# Patient Record
Sex: MALE | Race: WHITE | NOT HISPANIC OR LATINO | URBAN - NONMETROPOLITAN AREA
[De-identification: names, ages, dates, MRNs, and addresses within clinical notes are randomized per-mention and may not be internally consistent; named-entity substitution may affect disease eponyms.]

---

## 2020-09-23 ENCOUNTER — TRANSCRIBE ORDERS (OUTPATIENT)
Dept: ADMINISTRATIVE | Facility: HOSPITAL | Age: 45
End: 2020-09-23

## 2020-09-23 DIAGNOSIS — R79.9 ABNORMAL FINDING OF BLOOD CHEMISTRY: ICD-10-CM

## 2020-09-23 DIAGNOSIS — F10.19 ALCOHOL ABUSE WITH UNSPECIFIED ALCOHOL-INDUCED DISORDER (HCC): Primary | ICD-10-CM

## 2020-09-28 ENCOUNTER — HOSPITAL ENCOUNTER (OUTPATIENT)
Dept: ULTRASOUND IMAGING | Facility: HOSPITAL | Age: 45
Discharge: HOME | End: 2020-09-28
Admitting: NURSE PRACTITIONER

## 2020-09-28 DIAGNOSIS — R79.9 ABNORMAL FINDING OF BLOOD CHEMISTRY: ICD-10-CM

## 2020-09-28 DIAGNOSIS — F10.19 ALCOHOL ABUSE WITH UNSPECIFIED ALCOHOL-INDUCED DISORDER (HCC): ICD-10-CM

## 2020-09-28 PROCEDURE — 76700 US EXAM ABDOM COMPLETE: CPT

## 2020-09-28 PROCEDURE — 76700 US EXAM ABDOM COMPLETE: CPT | Performed by: RADIOLOGY

## 2021-11-29 ENCOUNTER — HOSPITAL ENCOUNTER (OUTPATIENT)
Dept: GENERAL RADIOLOGY | Facility: HOSPITAL | Age: 46
Discharge: HOME | End: 2021-11-29
Admitting: PHYSICIAN ASSISTANT

## 2021-11-29 ENCOUNTER — OFFICE VISIT (OUTPATIENT)
Dept: ORTHOPEDIC SURGERY | Facility: CLINIC | Age: 46
End: 2021-11-29

## 2021-11-29 VITALS
HEIGHT: 72 IN | BODY MASS INDEX: 26.68 KG/M2 | SYSTOLIC BLOOD PRESSURE: 153 MMHG | WEIGHT: 197 LBS | DIASTOLIC BLOOD PRESSURE: 93 MMHG | HEART RATE: 89 BPM

## 2021-11-29 DIAGNOSIS — M25.572 LEFT ANKLE PAIN, UNSPECIFIED CHRONICITY: ICD-10-CM

## 2021-11-29 DIAGNOSIS — S82.842A CLOSED BIMALLEOLAR FRACTURE OF LEFT ANKLE, INITIAL ENCOUNTER: Primary | ICD-10-CM

## 2021-11-29 PROCEDURE — 27808 TREATMENT OF ANKLE FRACTURE: CPT | Performed by: PHYSICIAN ASSISTANT

## 2021-11-29 PROCEDURE — 73610 X-RAY EXAM OF ANKLE: CPT

## 2021-11-29 PROCEDURE — 73610 X-RAY EXAM OF ANKLE: CPT | Performed by: RADIOLOGY

## 2021-11-29 RX ORDER — HYDROCODONE BITARTRATE AND ACETAMINOPHEN 5; 325 MG/1; MG/1
1 TABLET ORAL EVERY 6 HOURS PRN
Status: ON HOLD | COMMUNITY
End: 2023-03-03

## 2021-11-29 RX ORDER — ALLOPURINOL 100 MG/1
TABLET ORAL
Status: ON HOLD | COMMUNITY
Start: 2021-10-19 | End: 2023-03-03

## 2021-11-29 RX ORDER — NAPROXEN 500 MG/1
500 TABLET ORAL 2 TIMES DAILY
Qty: 180 TABLET | Refills: 3 | Status: ON HOLD | OUTPATIENT
Start: 2021-11-29 | End: 2023-03-03
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
## 2021-12-01 ENCOUNTER — PATIENT ROUNDING (BHMG ONLY) (OUTPATIENT)
Dept: ORTHOPEDIC SURGERY | Facility: CLINIC | Age: 46
End: 2021-12-01

## 2021-12-01 NOTE — PROGRESS NOTES
December 1, 2021    Hello, may I speak with Ruperto Maxwell?    My name is Josiane Lucia.       I am  with [facility] [place]  [facility]  [address]  [place] [address] [phone].    Before we get started may I verify your date of birth? 1975    I am calling to officially welcome you to our practice and ask about your recent visit. Is this a good time to talk? yes    Tell me about your visit with us. What things went well?  Visit went fine. I liked the doctor and the staff were all great.        We're always looking for ways to make our patients' experiences even better. Do you have recommendations on ways we may improve?  no    Overall were you satisfied with your first visit to our practice? yes       I appreciate you taking the time to speak with me today. Is there anything else I can do for you? no      Thank you, and have a great day.      
2

## 2021-12-06 ENCOUNTER — HOSPITAL ENCOUNTER (OUTPATIENT)
Dept: GENERAL RADIOLOGY | Facility: HOSPITAL | Age: 46
Discharge: HOME | End: 2021-12-06
Admitting: PHYSICIAN ASSISTANT

## 2021-12-06 ENCOUNTER — OFFICE VISIT (OUTPATIENT)
Dept: ORTHOPEDIC SURGERY | Facility: CLINIC | Age: 46
End: 2021-12-06

## 2021-12-06 VITALS — BODY MASS INDEX: 26.68 KG/M2 | HEIGHT: 72 IN | WEIGHT: 197 LBS

## 2021-12-06 DIAGNOSIS — S82.842A CLOSED BIMALLEOLAR FRACTURE OF LEFT ANKLE, INITIAL ENCOUNTER: Primary | ICD-10-CM

## 2021-12-06 PROCEDURE — 99024 POSTOP FOLLOW-UP VISIT: CPT | Performed by: PHYSICIAN ASSISTANT

## 2021-12-06 PROCEDURE — 73610 X-RAY EXAM OF ANKLE: CPT | Performed by: RADIOLOGY

## 2021-12-06 PROCEDURE — 73610 X-RAY EXAM OF ANKLE: CPT

## 2021-12-06 RX ORDER — LANOLIN ALCOHOL/MO/W.PET/CERES
CREAM (GRAM) TOPICAL
Status: ON HOLD | COMMUNITY
Start: 2021-12-02 | End: 2023-03-03

## 2021-12-06 RX ORDER — FOLIC ACID 1 MG/1
TABLET ORAL
Status: ON HOLD | COMMUNITY
Start: 2021-12-02 | End: 2023-03-03

## 2021-12-06 NOTE — PROGRESS NOTES
[facility] [place] [place] Visit          Patient: Ruperto Maxwell  YOB: 1975  Date of Encounter: 12/6/2021  PCP: Tonya Arellano APRN       Subjective     Chief Complaint   Patient presents with   • Left Ankle - Follow-up, Fracture, Pain, Edema           History of Present Illness:      Ruperto Maxwell is a 46 y.o. male presents today secondary to approaching 2-week history which patient suffered a left bimalleolar ankle fracture.  Patient has been compliant with immobilization and maximum elevation.  He reports the swelling will go down causing the cast to become loose however upon independent position he will have return of the swelling and dull throbbing aching sensation with slight tightness into the cast.  He reports resolving ecchymosis into the midfoot progressing into the webspaces of the toes.  He reports longstanding altered paresthesia into the great toe as result of a previous injury.  He reports no other new complaints today.       There is no problem list on file for this patient.    History reviewed. No pertinent past medical history.  Past Surgical History:   Procedure Laterality Date   • HAND SURGERY       Social History     Occupational History   • Not on file   Tobacco Use   • Smoking status: Never Smoker   • Smokeless tobacco: Current User     Types: Snuff   Vaping Use   • Vaping Use: Never used   Substance and Sexual Activity   • Alcohol use: Yes     Comment: occasionally   • Drug use: Never   • Sexual activity: Defer    Ruperto Maxwell  reports that he has never smoked. His smokeless tobacco use includes snuff.. I have educated him on the risk of diseases from using tobacco products such as cancer, COPD and heart disease.       Social History     Social History Narrative   • Not on file     Family History   Problem Relation Age of Onset   • No Known Problems Mother    • No Known Problems Father      Current Outpatient Medications   Medication Sig Dispense Refill   •  "allopurinol (ZYLOPRIM) 100 MG tablet TAKE ONE TABLET BY MOUTH TWO TIMES A DAY FOR GOUT PREVENTION     • folic acid (FOLVITE) 1 MG tablet TAKE ONE TABLET BY MOUTH EVERY DAY FOR VITAMIN     • HYDROcodone-acetaminophen (NORCO) 5-325 MG per tablet Take 1 tablet by mouth Every 6 (Six) Hours As Needed.     • naproxen (NAPROSYN) 500 MG tablet Take 1 tablet by mouth 2 (Two) Times a Day. 180 tablet 3   • thiamine 100 MG tablet TAKE ONE TABLET BY MOUTH EVERY DAY FOR VITAMIN       No current facility-administered medications for this visit.     No Known Allergies         Review of Systems   Constitutional: Negative.   HENT: Negative.    Eyes: Negative.    Cardiovascular: Negative.    Respiratory: Negative.    Endocrine: Negative.    Hematologic/Lymphatic: Negative.    Skin: Negative.    Musculoskeletal:        Pertinent positives listed in HPI   Gastrointestinal: Negative.    Genitourinary: Negative.    Neurological: Negative.    Psychiatric/Behavioral: Negative.    Allergic/Immunologic: Negative.          Objective      Vitals:    12/06/21 1037   Weight: 89.4 kg (197 lb)   Height: 182.9 cm (72\")      Patient's Body mass index is 26.72 kg/m². indicating that he is overweight (BMI 25-29.9). Obesity-related health conditions include the following: Listed in PMH. Obesity is newly identified. BMI is is above average; BMI management plan is completed. We discussed portion control and increasing exercise..      Physical Exam  Vitals and nursing note reviewed.   Constitutional:       General: He is not in acute distress.     Appearance: Normal appearance. He is not ill-appearing.   HENT:      Head: Normocephalic and atraumatic.      Right Ear: External ear normal.      Left Ear: External ear normal.      Nose: Nose normal.      Mouth/Throat:      Mouth: Mucous membranes are moist.      Pharynx: Oropharynx is clear.   Eyes:      Extraocular Movements: Extraocular movements intact.      Conjunctiva/sclera: Conjunctivae normal.      " Pupils: Pupils are equal, round, and reactive to light.   Cardiovascular:      Rate and Rhythm: Normal rate.      Pulses: Normal pulses.   Pulmonary:      Effort: Pulmonary effort is normal.   Abdominal:      General: There is no distension.   Musculoskeletal:      Cervical back: Normal range of motion. No rigidity.      Comments: Left foot and ankle examination today reveals short leg fiberglass cast is intact with continued swelling into the midfoot extending into the webspaces of the toes resolving ecchymosis.patient's short leg fiberglass cast is intact with no significant skin or cast breakdown.  Neurovascular status grossly intact left lower extremity.   Skin:     General: Skin is warm and dry.      Capillary Refill: Capillary refill takes less than 2 seconds.   Neurological:      General: No focal deficit present.      Mental Status: He is alert and oriented to person, place, and time.      Cranial Nerves: Cranial nerves are intact.   Psychiatric:         Mood and Affect: Mood normal.         Behavior: Behavior normal.                 Radiology:      EXAMINATION: XR ANKLE 3+ VIEW LEFT-      CLINICAL INDICATION: left ankle pain; S82.842A-Displaced bimalleolar  fracture of left lower leg, initial encounter for closed fracture        COMPARISON: 11/29/2021.     FINDINGS:  Three views of the left ankle show distal fibular fracture and distal  tibial fracture. Alignment is near anatomic. Overlying cast is present.      This report was finalized on 12/6/2021 10:44 AM by Dr. Haris Gruber MD.             Assessment/Plan        ICD-10-CM ICD-9-CM   1. Closed bimalleolar fracture of left ankle, initial encounter  S82.842A 824.4     46-year-old male with approaching 2-week history of acute left ankle twisting injury which patient suffered a Raphael B distal fibula fracture with associated posterior malleolus fracture.  Patient will continue with immobilization with a short leg fiberglass cast.  He will return back in 3  weeks for repeat radiographs out of cast and periosteal callus check.  The patient may continue with the crutches or knee walker as well as his light duty work restrictions.  Once again follow-up in 3 weeks for repeat x-rays and periosteal callus check.                  This document was signed by Kurt Byrd PA-C December 6, 2021     CC: Tonya Arellano APRN EMR Dragon/Transcription disclaimer:  Part of this note may be completed utilizing the dragon speech recognition software. This electronic transcription/translation of spoken language to printed text may contain grammatical errors, random word insertions, pronoun errors, and incomplete sentences or occasional consequences of the system due to software limitations, ambient noise, and hardware issues.  Any questions or concerns about the content, text, or information contained within the body of this dictation should be directly addressed to the physician for clarification.

## 2021-12-28 DIAGNOSIS — S82.842A CLOSED BIMALLEOLAR FRACTURE OF LEFT ANKLE, INITIAL ENCOUNTER: Primary | ICD-10-CM

## 2021-12-30 ENCOUNTER — OFFICE VISIT (OUTPATIENT)
Dept: ORTHOPEDIC SURGERY | Facility: CLINIC | Age: 46
End: 2021-12-30

## 2021-12-30 ENCOUNTER — HOSPITAL ENCOUNTER (OUTPATIENT)
Dept: GENERAL RADIOLOGY | Facility: HOSPITAL | Age: 46
Discharge: HOME | End: 2021-12-30
Admitting: PHYSICIAN ASSISTANT

## 2021-12-30 VITALS — BODY MASS INDEX: 26.68 KG/M2 | WEIGHT: 197 LBS | HEIGHT: 72 IN

## 2021-12-30 DIAGNOSIS — S82.842A CLOSED BIMALLEOLAR FRACTURE OF LEFT ANKLE, INITIAL ENCOUNTER: ICD-10-CM

## 2021-12-30 DIAGNOSIS — S82.842D CLOSED BIMALLEOLAR FRACTURE OF LEFT ANKLE WITH ROUTINE HEALING, SUBSEQUENT ENCOUNTER: Primary | ICD-10-CM

## 2021-12-30 PROCEDURE — 99024 POSTOP FOLLOW-UP VISIT: CPT | Performed by: PHYSICIAN ASSISTANT

## 2021-12-30 PROCEDURE — 73610 X-RAY EXAM OF ANKLE: CPT | Performed by: RADIOLOGY

## 2021-12-30 PROCEDURE — 73610 X-RAY EXAM OF ANKLE: CPT

## 2021-12-30 NOTE — PROGRESS NOTES
[facility] – [place] Orth[facility] Established Patient Visit          Patient: Ruperto Maxwell  YOB: 1975  Date of Encounter: 12/30/2021  PCP: Tonya Arellano APRN       Subjective     Chief Complaint   Patient presents with   • Left Ankle - Follow-up           History of Present Illness:      Ruperto Maxwell is a 46 y.o. male presents today secondary to approaching 6 week history which patient suffered a left bimalleolar ankle fracture.  Patient has been compliant with immobilization and maximum elevation.  He reports the swelling will go down causing the cast to become loose however upon dependent position he will have return of the swelling. He has had reduction of previous dull throbbing aching sensation. He reports resolving ecchymosis into the midfoot progressing into the webspaces of the toes.  He reports longstanding altered paresthesia into the great toe as result of a previous injury.  Patient presents today for repeat radiographs out of cast and callus evaluation.  he reports no other new complaints today.       There is no problem list on file for this patient.    History reviewed. No pertinent past medical history.  Past Surgical History:   Procedure Laterality Date   • HAND SURGERY       Social History     Occupational History   • Not on file   Tobacco Use   • Smoking status: Never Smoker   • Smokeless tobacco: Current User     Types: Snuff   Vaping Use   • Vaping Use: Never used   Substance and Sexual Activity   • Alcohol use: Yes     Comment: occasionally   • Drug use: Never   • Sexual activity: Defer    Ruperto Maxwell  reports that he has never smoked. His smokeless tobacco use includes snuff.. I have educated him on the risk of diseases from using tobacco products such as cancer, COPD and heart disease.       Social History     Social History Narrative   • Not on file     Family History   Problem Relation Age of Onset   • No Known Problems Mother    • No Known Problems Father      Current  "Outpatient Medications   Medication Sig Dispense Refill   • allopurinol (ZYLOPRIM) 100 MG tablet TAKE ONE TABLET BY MOUTH TWO TIMES A DAY FOR GOUT PREVENTION     • folic acid (FOLVITE) 1 MG tablet TAKE ONE TABLET BY MOUTH EVERY DAY FOR VITAMIN     • HYDROcodone-acetaminophen (NORCO) 5-325 MG per tablet Take 1 tablet by mouth Every 6 (Six) Hours As Needed.     • naproxen (NAPROSYN) 500 MG tablet Take 1 tablet by mouth 2 (Two) Times a Day. 180 tablet 3   • thiamine 100 MG tablet TAKE ONE TABLET BY MOUTH EVERY DAY FOR VITAMIN       No current facility-administered medications for this visit.     No Known Allergies         Review of Systems   Constitutional: Negative.   HENT: Negative.    Eyes: Negative.    Cardiovascular: Negative.    Respiratory: Negative.    Endocrine: Negative.    Hematologic/Lymphatic: Negative.    Skin: Negative.    Musculoskeletal:        Pertinent positives listed in HPI   Gastrointestinal: Negative.    Genitourinary: Negative.    Neurological: Negative.    Psychiatric/Behavioral: Negative.    Allergic/Immunologic: Negative.          Objective      Vitals:    12/30/21 0931   Weight: 89.4 kg (197 lb)   Height: 182.9 cm (72\")      Patient's Body mass index is 26.72 kg/m². indicating that he is overweight (BMI 25-29.9). Obesity-related health conditions include the following: Listed in PMH. Obesity is newly identified. BMI is is above average; BMI management plan is completed. We discussed portion control and increasing exercise..      Physical Exam  Vitals and nursing note reviewed.   Constitutional:       General: He is not in acute distress.     Appearance: Normal appearance. He is not ill-appearing.   HENT:      Head: Normocephalic and atraumatic.      Right Ear: External ear normal.      Left Ear: External ear normal.      Nose: Nose normal.      Mouth/Throat:      Mouth: Mucous membranes are moist.      Pharynx: Oropharynx is clear.   Eyes:      Extraocular Movements: Extraocular movements " intact.      Conjunctiva/sclera: Conjunctivae normal.      Pupils: Pupils are equal, round, and reactive to light.   Cardiovascular:      Rate and Rhythm: Normal rate.      Pulses: Normal pulses.   Pulmonary:      Effort: Pulmonary effort is normal.   Abdominal:      General: There is no distension.   Musculoskeletal:      Cervical back: Normal range of motion. No rigidity.      Comments: Left foot and ankle examination today reveals short leg fiberglass cast removed revealing mild swelling into the midfoot extending into the webspaces of the toes. There is resolution of previous ecchymosis. No significant skin breakdown.  Patient has gentle full dorsiflexion plantarflexion with mild pain upon ankle inversion.  Subtalar motion intact.  Neurovascular status grossly intact left lower extremity.   Skin:     General: Skin is warm and dry.      Capillary Refill: Capillary refill takes less than 2 seconds.   Neurological:      General: No focal deficit present.      Mental Status: He is alert and oriented to person, place, and time.      Cranial Nerves: Cranial nerves are intact.   Psychiatric:         Mood and Affect: Mood normal.         Behavior: Behavior normal.                 Radiology:      XR Ankle 3+ View Left    Result Date: 12/30/2021  Healing fractures of the ankle.  This report was finalized on 12/30/2021 10:32 AM by Dr. Haris Gruber MD.              Assessment/Plan        ICD-10-CM ICD-9-CM   1. Closed bimalleolar fracture of left ankle with routine healing, subsequent encounter  S82.842D V54.19     46-year-old male with approaching 6-week history of acute left ankle twisting injury which patient suffered a Raphael B distal fibula fracture with associated posterior malleolus fracture.  Radiographs reveal early healing of fracture fragments.  Patient will implement ambulation equalizer boot and will slowly begin to progress out of this over the course of the next 2 weeks.  He may remove for range of motion and  bathing and gentle ambulation as pain and swelling are his guide. 2 weeks for repeat radiographs. He will continue light duty work restrictions partial weightbearing status with boot over the next 2 weeks.                  This document was signed by Kurt Byrd PA-C December 30, 2021     CC: Tonya Arellano APRN EMR Dragon/Transcription disclaimer:  Part of this note may be completed utilizing the dragon speech recognition software. This electronic transcription/translation of spoken language to printed text may contain grammatical errors, random word insertions, pronoun errors, and incomplete sentences or occasional consequences of the system due to software limitations, ambient noise, and hardware issues.  Any questions or concerns about the content, text, or information contained within the body of this dictation should be directly addressed to the physician for clarification.

## 2022-01-06 ENCOUNTER — TELEPHONE (OUTPATIENT)
Dept: ORTHOPEDIC SURGERY | Facility: CLINIC | Age: 47
End: 2022-01-06

## 2022-01-06 DIAGNOSIS — M79.672 LEFT FOOT PAIN: ICD-10-CM

## 2022-01-06 DIAGNOSIS — M25.572 LEFT ANKLE PAIN, UNSPECIFIED CHRONICITY: ICD-10-CM

## 2022-01-06 DIAGNOSIS — S82.842D CLOSED BIMALLEOLAR FRACTURE OF LEFT ANKLE WITH ROUTINE HEALING, SUBSEQUENT ENCOUNTER: Primary | ICD-10-CM

## 2022-01-06 NOTE — TELEPHONE ENCOUNTER
Caller: IGNACIO ALEXANDER    Relationship to patient: SELF    Best call back number:     Patient is needing: PATIENT SAID HAS 3 BROKEN TOES ON LEFT FOOT THAT ARE GIVING  HIM A HARD TIME.  I ASKED IF PATIENT WANTED TO TRY TO MAKE IT IN TODAY.  HE ASKED ABOUT COMING IN TOMORROW.  PLEASE CONTACT PATIENT

## 2022-01-06 NOTE — TELEPHONE ENCOUNTER
Spoke with patient regarding the left foot swelling, numbness, and pain. Offered patient appointment 1/6/2022 patient refused. Spoke to Kurt who suggest the patient come in, send pictures, and elevate. Relayed message to patient and gave patient a number to text pictures to since he refused to come in today.

## 2022-01-06 NOTE — TELEPHONE ENCOUNTER
Provider: CARINA MAK    Caller:  SELF      Relationship to Patient: SELF      Phone Number:  [phone]    Reason for Call:pt. Called back- HAS NOT HEARD BACK YET. I TRANSFERRED TO OFFICE

## 2022-01-06 NOTE — TELEPHONE ENCOUNTER
After receiving the photos and showing to Kurt Called the patient back to schedule an appt on Monday and ordered US Doppler to rule out a clot. When speaking to the patient he didn't want to have the US done he asked why cant he just come in to the office to have an xray done. Let the patient know what a US was and scheduled the patient to come in for a follow-up. Patient verbalized understanding.

## 2022-01-10 ENCOUNTER — OFFICE VISIT (OUTPATIENT)
Dept: ORTHOPEDIC SURGERY | Facility: CLINIC | Age: 47
End: 2022-01-10

## 2022-01-10 ENCOUNTER — HOSPITAL ENCOUNTER (OUTPATIENT)
Dept: GENERAL RADIOLOGY | Facility: HOSPITAL | Age: 47
Discharge: HOME | End: 2022-01-10
Admitting: PHYSICIAN ASSISTANT

## 2022-01-10 VITALS — BODY MASS INDEX: 26.68 KG/M2 | WEIGHT: 197 LBS | HEIGHT: 72 IN

## 2022-01-10 DIAGNOSIS — S82.842D CLOSED BIMALLEOLAR FRACTURE OF LEFT ANKLE WITH ROUTINE HEALING, SUBSEQUENT ENCOUNTER: Primary | ICD-10-CM

## 2022-01-10 DIAGNOSIS — S82.842D CLOSED BIMALLEOLAR FRACTURE OF LEFT ANKLE WITH ROUTINE HEALING, SUBSEQUENT ENCOUNTER: ICD-10-CM

## 2022-01-10 DIAGNOSIS — M10.9 ACUTE GOUT INVOLVING TOE OF LEFT FOOT, UNSPECIFIED CAUSE: ICD-10-CM

## 2022-01-10 DIAGNOSIS — M79.672 LEFT FOOT PAIN: ICD-10-CM

## 2022-01-10 PROCEDURE — 99024 POSTOP FOLLOW-UP VISIT: CPT | Performed by: PHYSICIAN ASSISTANT

## 2022-01-10 PROCEDURE — 73630 X-RAY EXAM OF FOOT: CPT

## 2022-01-10 PROCEDURE — 73630 X-RAY EXAM OF FOOT: CPT | Performed by: RADIOLOGY

## 2022-01-10 PROCEDURE — 99213 OFFICE O/P EST LOW 20 MIN: CPT | Performed by: PHYSICIAN ASSISTANT

## 2022-01-10 PROCEDURE — 73610 X-RAY EXAM OF ANKLE: CPT

## 2022-01-10 PROCEDURE — 73610 X-RAY EXAM OF ANKLE: CPT | Performed by: RADIOLOGY

## 2022-01-10 RX ORDER — COLCHICINE 0.6 MG/1
TABLET ORAL
Qty: 3 TABLET | Refills: 0 | Status: ON HOLD | OUTPATIENT
Start: 2022-01-10 | End: 2023-03-03

## 2022-01-10 NOTE — PROGRESS NOTES
[facility] Surgery Established Patient Visit          Patient: Ruperto Maxwell  YOB: 1975  Date of Encounter: 12/30/2021  PCP: Tonya Arellano APRN       Subjective     Chief Complaint   Patient presents with   • Left Foot - New-Problem, Edema   • Left Ankle - Follow-up, Fracture, Pain           History of Present Illness:      Ruperto Maxwell is a 46 y.o. male presents today secondary to approaching 8 week history which patient suffered a left bimalleolar ankle fracture.  Patient has been compliant with immobilization and maximum elevation.  He reports since removal of the cast and placement of the equalizer boot he has began to have pain and swelling and redness into the dorsum of the foot specifically into the great toe.  Patient states that this will go down upon elevation of his lower extremity.  Patient recalls no specific injury to the foot or toes at time of ankle fracture.  Patient does report a history of gout flareups following injuries in the past.  He does take allopurinol 100 mg and has seen no improvement with usage of the Naprosyn.     There is no problem list on file for this patient.    History reviewed. No pertinent past medical history.   Past Surgical History:   Procedure Laterality Date   • HAND SURGERY       Social History     Occupational History   • Not on file   Tobacco Use   • Smoking status: Never Smoker   • Smokeless tobacco: Current User     Types: Snuff   Vaping Use   • Vaping Use: Never used   Substance and Sexual Activity   • Alcohol use: Yes     Comment: occasionally   • Drug use: Never   • Sexual activity: Defer    Ruperto Maxwell  reports that he has never smoked. His smokeless tobacco use includes snuff.. I have educated him on the risk of diseases from using tobacco products such as cancer, COPD and heart disease.       Social History     Social History Narrative   • Not on file     Family History   Problem Relation Age of Onset   • No Known Problems Mother    •  "No Known Problems Father      Current Outpatient Medications   Medication Sig Dispense Refill   • allopurinol (ZYLOPRIM) 100 MG tablet TAKE ONE TABLET BY MOUTH TWO TIMES A DAY FOR GOUT PREVENTION     • folic acid (FOLVITE) 1 MG tablet TAKE ONE TABLET BY MOUTH EVERY DAY FOR VITAMIN     • HYDROcodone-acetaminophen (NORCO) 5-325 MG per tablet Take 1 tablet by mouth Every 6 (Six) Hours As Needed.     • naproxen (NAPROSYN) 500 MG tablet Take 1 tablet by mouth 2 (Two) Times a Day. 180 tablet 3   • thiamine 100 MG tablet TAKE ONE TABLET BY MOUTH EVERY DAY FOR VITAMIN     • colchicine 0.6 MG tablet 1.2 mg (2 tab) followed by 0.6 1 tablet 1 hour later 3 tablet 0     No current facility-administered medications for this visit.     No Known Allergies         Review of Systems   Constitutional: Negative.   HENT: Negative.    Eyes: Negative.    Cardiovascular: Negative.    Respiratory: Negative.    Endocrine: Negative.    Hematologic/Lymphatic: Negative.    Skin: Negative.    Musculoskeletal:        Pertinent positives listed in HPI   Gastrointestinal: Negative.    Genitourinary: Negative.    Neurological: Negative.    Psychiatric/Behavioral: Negative.    Allergic/Immunologic: Negative.          Objective      Vitals:    01/10/22 0953   Weight: 89.4 kg (197 lb)   Height: 182.9 cm (72\")      Patient's Body mass index is 26.72 kg/m². indicating that he is overweight (BMI 25-29.9). Obesity-related health conditions include the following: Listed in PMH. Obesity is newly identified. BMI is is above average; BMI management plan is completed. We discussed portion control and increasing exercise..      Physical Exam  Vitals and nursing note reviewed.   Constitutional:       General: He is not in acute distress.     Appearance: Normal appearance. He is not ill-appearing.   HENT:      Head: Normocephalic and atraumatic.      Right Ear: External ear normal.      Left Ear: External ear normal.      Nose: Nose normal.      Mouth/Throat:      " Mouth: Mucous membranes are moist.      Pharynx: Oropharynx is clear.   Eyes:      Extraocular Movements: Extraocular movements intact.      Conjunctiva/sclera: Conjunctivae normal.      Pupils: Pupils are equal, round, and reactive to light.   Cardiovascular:      Rate and Rhythm: Normal rate.      Pulses: Normal pulses.   Pulmonary:      Effort: Pulmonary effort is normal.   Abdominal:      General: There is no distension.   Musculoskeletal:      Cervical back: Normal range of motion. No rigidity.      Comments: Left foot and ankle examination today reveals  revealing mild swelling into the midfoot extending into the webspaces of the toes. There is swelling and erythema with point tenderness to palpation along the 1st MTP. No significant skin breakdown.  Patient has gentle full dorsiflexion plantarflexion with no longer any pain upon ankle inversion.  Subtalar motion intact.  Neurovascular status grossly intact left lower extremity.   Skin:     General: Skin is warm and dry.      Capillary Refill: Capillary refill takes less than 2 seconds.   Neurological:      General: No focal deficit present.      Mental Status: He is alert and oriented to person, place, and time.      Cranial Nerves: Cranial nerves are intact.   Psychiatric:         Mood and Affect: Mood normal.         Behavior: Behavior normal.           Assessment/Plan        ICD-10-CM ICD-9-CM   1. Closed bimalleolar fracture of left ankle with routine healing, subsequent encounter  S82.842D V54.19   2. Acute gout involving toe of left foot, unspecified cause  M10.9 274.01     46-year-old male with approaching 8-week history of acute left ankle twisting injury which patient suffered a Raphael B distal fibula fracture with associated posterior malleolus fracture.  Radiographs revealed early healing of fracture fragments last office visit in which she was progressed out of the cast into an equalizer boot.  Upon progression of his range of motion and activity  weightbearing he has had a flareup of gout into the left foot first MTP.  He currently takes allopurinol and as a risk medication the patient was provided with a prescription for colchicine 0.6 mg 2 tablets followed by 1 tablet 1 hour later.  He was instructed to ambulate in the boot and progress with this activity as pain and swelling are his guide.  He will return back in 2 weeks for further evaluation and repeat x-rays.                This document was signed by Kurt Byrd PA-C January 10, 2022     CC: Tonya Arellano APRN EMR Dragon/Transcription disclaimer:  Part of this note may be completed utilizing the dragon speech recognition software. This electronic transcription/translation of spoken language to printed text may contain grammatical errors, random word insertions, pronoun errors, and incomplete sentences or occasional consequences of the system due to software limitations, ambient noise, and hardware issues.  Any questions or concerns about the content, text, or information contained within the body of this dictation should be directly addressed to the physician for clarification.

## 2022-01-21 DIAGNOSIS — M25.572 LEFT ANKLE PAIN, UNSPECIFIED CHRONICITY: Primary | ICD-10-CM

## 2023-03-03 ENCOUNTER — APPOINTMENT (OUTPATIENT)
Dept: CT IMAGING | Facility: HOSPITAL | Age: 48
End: 2023-03-03
Payer: COMMERCIAL

## 2023-03-03 ENCOUNTER — APPOINTMENT (OUTPATIENT)
Dept: ULTRASOUND IMAGING | Facility: HOSPITAL | Age: 48
End: 2023-03-03
Payer: COMMERCIAL

## 2023-03-03 ENCOUNTER — APPOINTMENT (OUTPATIENT)
Dept: GENERAL RADIOLOGY | Facility: HOSPITAL | Age: 48
End: 2023-03-03
Payer: COMMERCIAL

## 2023-03-03 ENCOUNTER — HOSPITAL ENCOUNTER (OUTPATIENT)
Facility: HOSPITAL | Age: 48
Setting detail: OBSERVATION
Discharge: HOME | End: 2023-03-05
Attending: EMERGENCY MEDICINE | Admitting: INTERNAL MEDICINE
Payer: COMMERCIAL

## 2023-03-03 DIAGNOSIS — K85.20 ALCOHOL-INDUCED ACUTE PANCREATITIS WITHOUT INFECTION OR NECROSIS: Primary | ICD-10-CM

## 2023-03-03 PROBLEM — M10.9 GOUT OF MULTIPLE SITES: Status: ACTIVE | Noted: 2023-03-03

## 2023-03-03 PROBLEM — F10.932: Status: ACTIVE | Noted: 2023-03-03

## 2023-03-03 PROBLEM — R56.9: Status: ACTIVE | Noted: 2023-03-03

## 2023-03-03 LAB
ALBUMIN SERPL-MCNC: 4.1 G/DL (ref 3.5–5.2)
ALBUMIN/GLOB SERPL: 1.1 G/DL
ALP SERPL-CCNC: 190 U/L (ref 39–117)
ALT SERPL W P-5'-P-CCNC: 22 U/L (ref 1–41)
AMPHET+METHAMPHET UR QL: NEGATIVE
AMPHETAMINES UR QL: NEGATIVE
AMYLASE SERPL-CCNC: 83 U/L (ref 28–100)
ANION GAP SERPL CALCULATED.3IONS-SCNC: 15.4 MMOL/L (ref 5–15)
AST SERPL-CCNC: 40 U/L (ref 1–40)
BARBITURATES UR QL SCN: NEGATIVE
BASOPHILS # BLD AUTO: 0.02 10*3/MM3 (ref 0–0.2)
BASOPHILS NFR BLD AUTO: 0.2 % (ref 0–1.5)
BENZODIAZ UR QL SCN: POSITIVE
BILIRUB SERPL-MCNC: 1.1 MG/DL (ref 0–1.2)
BILIRUB UR QL STRIP: NEGATIVE
BUN SERPL-MCNC: 7 MG/DL (ref 6–20)
BUN/CREAT SERPL: 7.3 (ref 7–25)
BUPRENORPHINE SERPL-MCNC: NEGATIVE NG/ML
CALCIUM SPEC-SCNC: 9.3 MG/DL (ref 8.6–10.5)
CANNABINOIDS SERPL QL: NEGATIVE
CHLORIDE SERPL-SCNC: 100 MMOL/L (ref 98–107)
CLARITY UR: CLEAR
CO2 SERPL-SCNC: 23.6 MMOL/L (ref 22–29)
COCAINE UR QL: NEGATIVE
COLOR UR: YELLOW
CREAT SERPL-MCNC: 0.96 MG/DL (ref 0.76–1.27)
CRP SERPL-MCNC: <0.3 MG/DL (ref 0–0.5)
D DIMER PPP FEU-MCNC: 0.55 MCGFEU/ML (ref 0–0.5)
D-LACTATE SERPL-SCNC: 1 MMOL/L (ref 0.5–2)
D-LACTATE SERPL-SCNC: 1.9 MMOL/L (ref 0.5–2)
D-LACTATE SERPL-SCNC: 3.3 MMOL/L (ref 0.5–2)
DEPRECATED RDW RBC AUTO: 57.3 FL (ref 37–54)
EGFRCR SERPLBLD CKD-EPI 2021: 96.9 ML/MIN/1.73
EOSINOPHIL # BLD AUTO: 0.01 10*3/MM3 (ref 0–0.4)
EOSINOPHIL NFR BLD AUTO: 0.1 % (ref 0.3–6.2)
ERYTHROCYTE [DISTWIDTH] IN BLOOD BY AUTOMATED COUNT: 15.7 % (ref 12.3–15.4)
ERYTHROCYTE [SEDIMENTATION RATE] IN BLOOD: 16 MM/HR (ref 0–15)
ETHANOL BLD-MCNC: <10 MG/DL (ref 0–10)
ETHANOL UR QL: <0.01 %
FLUAV RNA RESP QL NAA+PROBE: NOT DETECTED
FLUBV RNA RESP QL NAA+PROBE: NOT DETECTED
GEN 5 2HR TROPONIN T REFLEX: 8 NG/L
GLOBULIN UR ELPH-MCNC: 3.8 GM/DL
GLUCOSE SERPL-MCNC: 121 MG/DL (ref 65–99)
GLUCOSE UR STRIP-MCNC: NEGATIVE MG/DL
HAV IGM SERPL QL IA: NORMAL
HBV CORE IGM SERPL QL IA: NORMAL
HBV SURFACE AG SERPL QL IA: NORMAL
HCT VFR BLD AUTO: 41.3 % (ref 37.5–51)
HCV AB SER DONR QL: NORMAL
HGB BLD-MCNC: 14 G/DL (ref 13–17.7)
HGB UR QL STRIP.AUTO: NEGATIVE
HOLD SPECIMEN: NORMAL
HOLD SPECIMEN: NORMAL
IMM GRANULOCYTES # BLD AUTO: 0.06 10*3/MM3 (ref 0–0.05)
IMM GRANULOCYTES NFR BLD AUTO: 0.5 % (ref 0–0.5)
KETONES UR QL STRIP: NEGATIVE
LEUKOCYTE ESTERASE UR QL STRIP.AUTO: NEGATIVE
LIPASE SERPL-CCNC: 197 U/L (ref 13–60)
LYMPHOCYTES # BLD AUTO: 1.25 10*3/MM3 (ref 0.7–3.1)
LYMPHOCYTES NFR BLD AUTO: 10.3 % (ref 19.6–45.3)
MAGNESIUM SERPL-MCNC: 2.1 MG/DL (ref 1.6–2.6)
MCH RBC QN AUTO: 34.1 PG (ref 26.6–33)
MCHC RBC AUTO-ENTMCNC: 33.9 G/DL (ref 31.5–35.7)
MCV RBC AUTO: 100.7 FL (ref 79–97)
METHADONE UR QL SCN: NEGATIVE
MONOCYTES # BLD AUTO: 0.8 10*3/MM3 (ref 0.1–0.9)
MONOCYTES NFR BLD AUTO: 6.6 % (ref 5–12)
NEUTROPHILS NFR BLD AUTO: 10.04 10*3/MM3 (ref 1.7–7)
NEUTROPHILS NFR BLD AUTO: 82.3 % (ref 42.7–76)
NITRITE UR QL STRIP: NEGATIVE
NRBC BLD AUTO-RTO: 0 /100 WBC (ref 0–0.2)
NT-PROBNP SERPL-MCNC: 249 PG/ML (ref 0–450)
OPIATES UR QL: POSITIVE
OXYCODONE UR QL SCN: NEGATIVE
PCP UR QL SCN: NEGATIVE
PH UR STRIP.AUTO: 6 [PH] (ref 5–8)
PLATELET # BLD AUTO: 192 10*3/MM3 (ref 140–450)
PMV BLD AUTO: 10.1 FL (ref 6–12)
POTASSIUM SERPL-SCNC: 3.3 MMOL/L (ref 3.5–5.2)
PROPOXYPH UR QL: NEGATIVE
PROT SERPL-MCNC: 7.9 G/DL (ref 6–8.5)
PROT UR QL STRIP: NEGATIVE
QT INTERVAL: 298 MS
QTC INTERVAL: 435 MS
RBC # BLD AUTO: 4.1 10*6/MM3 (ref 4.14–5.8)
SARS-COV-2 RNA RESP QL NAA+PROBE: NOT DETECTED
SODIUM SERPL-SCNC: 139 MMOL/L (ref 136–145)
SP GR UR STRIP: >1.03 (ref 1–1.03)
TRICYCLICS UR QL SCN: NEGATIVE
TROPONIN T DELTA: -1 NG/L
TROPONIN T SERPL HS-MCNC: 9 NG/L
UROBILINOGEN UR QL STRIP: ABNORMAL
WBC NRBC COR # BLD: 12.18 10*3/MM3 (ref 3.4–10.8)
WHOLE BLOOD HOLD COAG: NORMAL
WHOLE BLOOD HOLD SPECIMEN: NORMAL

## 2023-03-03 PROCEDURE — 83605 ASSAY OF LACTIC ACID: CPT | Performed by: INTERNAL MEDICINE

## 2023-03-03 PROCEDURE — 71046 X-RAY EXAM CHEST 2 VIEWS: CPT

## 2023-03-03 PROCEDURE — 82150 ASSAY OF AMYLASE: CPT | Performed by: PHYSICIAN ASSISTANT

## 2023-03-03 PROCEDURE — 93005 ELECTROCARDIOGRAM TRACING: CPT | Performed by: EMERGENCY MEDICINE

## 2023-03-03 PROCEDURE — 99284 EMERGENCY DEPT VISIT MOD MDM: CPT

## 2023-03-03 PROCEDURE — 25010000002 KETOROLAC TROMETHAMINE PER 15 MG: Performed by: PHYSICIAN ASSISTANT

## 2023-03-03 PROCEDURE — 71275 CT ANGIOGRAPHY CHEST: CPT | Performed by: RADIOLOGY

## 2023-03-03 PROCEDURE — 25010000002 MORPHINE PER 10 MG: Performed by: EMERGENCY MEDICINE

## 2023-03-03 PROCEDURE — 83735 ASSAY OF MAGNESIUM: CPT | Performed by: NURSE PRACTITIONER

## 2023-03-03 PROCEDURE — 96361 HYDRATE IV INFUSION ADD-ON: CPT

## 2023-03-03 PROCEDURE — 96376 TX/PRO/DX INJ SAME DRUG ADON: CPT

## 2023-03-03 PROCEDURE — 83690 ASSAY OF LIPASE: CPT | Performed by: NURSE PRACTITIONER

## 2023-03-03 PROCEDURE — 96372 THER/PROPH/DIAG INJ SC/IM: CPT

## 2023-03-03 PROCEDURE — 25510000001 IOPAMIDOL PER 1 ML: Performed by: EMERGENCY MEDICINE

## 2023-03-03 PROCEDURE — 96366 THER/PROPH/DIAG IV INF ADDON: CPT

## 2023-03-03 PROCEDURE — G0378 HOSPITAL OBSERVATION PER HR: HCPCS

## 2023-03-03 PROCEDURE — 71275 CT ANGIOGRAPHY CHEST: CPT

## 2023-03-03 PROCEDURE — 25010000002 PIPERACILLIN SOD-TAZOBACTAM PER 1 G: Performed by: NURSE PRACTITIONER

## 2023-03-03 PROCEDURE — 25010000002 ENOXAPARIN PER 10 MG: Performed by: INTERNAL MEDICINE

## 2023-03-03 PROCEDURE — 96367 TX/PROPH/DG ADDL SEQ IV INF: CPT

## 2023-03-03 PROCEDURE — C9803 HOPD COVID-19 SPEC COLLECT: HCPCS

## 2023-03-03 PROCEDURE — 74177 CT ABD & PELVIS W/CONTRAST: CPT

## 2023-03-03 PROCEDURE — 74177 CT ABD & PELVIS W/CONTRAST: CPT | Performed by: RADIOLOGY

## 2023-03-03 PROCEDURE — 80053 COMPREHEN METABOLIC PANEL: CPT | Performed by: EMERGENCY MEDICINE

## 2023-03-03 PROCEDURE — 96375 TX/PRO/DX INJ NEW DRUG ADDON: CPT

## 2023-03-03 PROCEDURE — 87636 SARSCOV2 & INF A&B AMP PRB: CPT | Performed by: NURSE PRACTITIONER

## 2023-03-03 PROCEDURE — 85652 RBC SED RATE AUTOMATED: CPT | Performed by: NURSE PRACTITIONER

## 2023-03-03 PROCEDURE — 25010000002 HYDROMORPHONE PER 4 MG: Performed by: INTERNAL MEDICINE

## 2023-03-03 PROCEDURE — 76705 ECHO EXAM OF ABDOMEN: CPT

## 2023-03-03 PROCEDURE — 85025 COMPLETE CBC W/AUTO DIFF WBC: CPT | Performed by: EMERGENCY MEDICINE

## 2023-03-03 PROCEDURE — 85379 FIBRIN DEGRADATION QUANT: CPT | Performed by: NURSE PRACTITIONER

## 2023-03-03 PROCEDURE — 96365 THER/PROPH/DIAG IV INF INIT: CPT

## 2023-03-03 PROCEDURE — 81003 URINALYSIS AUTO W/O SCOPE: CPT | Performed by: PHYSICIAN ASSISTANT

## 2023-03-03 PROCEDURE — 70450 CT HEAD/BRAIN W/O DYE: CPT

## 2023-03-03 PROCEDURE — 83880 ASSAY OF NATRIURETIC PEPTIDE: CPT | Performed by: NURSE PRACTITIONER

## 2023-03-03 PROCEDURE — 93010 ELECTROCARDIOGRAM REPORT: CPT | Performed by: INTERNAL MEDICINE

## 2023-03-03 PROCEDURE — 82077 ASSAY SPEC XCP UR&BREATH IA: CPT | Performed by: NURSE PRACTITIONER

## 2023-03-03 PROCEDURE — 87040 BLOOD CULTURE FOR BACTERIA: CPT | Performed by: NURSE PRACTITIONER

## 2023-03-03 PROCEDURE — 25010000002 ONDANSETRON PER 1 MG: Performed by: PHYSICIAN ASSISTANT

## 2023-03-03 PROCEDURE — 80306 DRUG TEST PRSMV INSTRMNT: CPT | Performed by: PHYSICIAN ASSISTANT

## 2023-03-03 PROCEDURE — 83605 ASSAY OF LACTIC ACID: CPT | Performed by: NURSE PRACTITIONER

## 2023-03-03 PROCEDURE — 25010000002 VANCOMYCIN 5 G RECONSTITUTED SOLUTION: Performed by: NURSE PRACTITIONER

## 2023-03-03 PROCEDURE — 86140 C-REACTIVE PROTEIN: CPT | Performed by: NURSE PRACTITIONER

## 2023-03-03 PROCEDURE — 84484 ASSAY OF TROPONIN QUANT: CPT | Performed by: EMERGENCY MEDICINE

## 2023-03-03 PROCEDURE — 99223 1ST HOSP IP/OBS HIGH 75: CPT | Performed by: INTERNAL MEDICINE

## 2023-03-03 PROCEDURE — 25010000002 THIAMINE PER 100 MG: Performed by: PHYSICIAN ASSISTANT

## 2023-03-03 PROCEDURE — 36415 COLL VENOUS BLD VENIPUNCTURE: CPT

## 2023-03-03 PROCEDURE — 80074 ACUTE HEPATITIS PANEL: CPT | Performed by: NURSE PRACTITIONER

## 2023-03-03 PROCEDURE — 76705 ECHO EXAM OF ABDOMEN: CPT | Performed by: RADIOLOGY

## 2023-03-03 RX ORDER — METHION/INOS/CHOL BT/B COM/LIV 110MG-86MG
100 CAPSULE ORAL DAILY
Status: DISCONTINUED | OUTPATIENT
Start: 2023-03-04 | End: 2023-03-05 | Stop reason: HOSPADM

## 2023-03-03 RX ORDER — DIPHENOXYLATE HYDROCHLORIDE AND ATROPINE SULFATE 2.5; .025 MG/1; MG/1
1 TABLET ORAL DAILY
Status: DISCONTINUED | OUTPATIENT
Start: 2023-03-04 | End: 2023-03-05 | Stop reason: HOSPADM

## 2023-03-03 RX ORDER — SODIUM CHLORIDE 0.9 % (FLUSH) 0.9 %
10 SYRINGE (ML) INJECTION AS NEEDED
Status: DISCONTINUED | OUTPATIENT
Start: 2023-03-03 | End: 2023-03-05 | Stop reason: HOSPADM

## 2023-03-03 RX ORDER — ONDANSETRON 2 MG/ML
4 INJECTION INTRAMUSCULAR; INTRAVENOUS ONCE
Status: COMPLETED | OUTPATIENT
Start: 2023-03-03 | End: 2023-03-03

## 2023-03-03 RX ORDER — COLCHICINE 0.6 MG/1
0.6 TABLET ORAL DAILY
Status: DISCONTINUED | OUTPATIENT
Start: 2023-03-03 | End: 2023-03-05 | Stop reason: HOSPADM

## 2023-03-03 RX ORDER — LABETALOL HYDROCHLORIDE 5 MG/ML
10 INJECTION, SOLUTION INTRAVENOUS EVERY 4 HOURS PRN
Status: DISCONTINUED | OUTPATIENT
Start: 2023-03-03 | End: 2023-03-05 | Stop reason: HOSPADM

## 2023-03-03 RX ORDER — SODIUM CHLORIDE 0.9 % (FLUSH) 0.9 %
1-10 SYRINGE (ML) INJECTION AS NEEDED
Status: DISCONTINUED | OUTPATIENT
Start: 2023-03-03 | End: 2023-03-05 | Stop reason: HOSPADM

## 2023-03-03 RX ORDER — ONDANSETRON 2 MG/ML
4 INJECTION INTRAMUSCULAR; INTRAVENOUS EVERY 6 HOURS PRN
Status: DISCONTINUED | OUTPATIENT
Start: 2023-03-03 | End: 2023-03-05 | Stop reason: HOSPADM

## 2023-03-03 RX ORDER — ENOXAPARIN SODIUM 100 MG/ML
40 INJECTION SUBCUTANEOUS EVERY 24 HOURS
Status: DISCONTINUED | OUTPATIENT
Start: 2023-03-03 | End: 2023-03-05 | Stop reason: HOSPADM

## 2023-03-03 RX ORDER — OMEPRAZOLE 20 MG/1
20 TABLET, DELAYED RELEASE ORAL DAILY PRN
COMMUNITY

## 2023-03-03 RX ORDER — LORAZEPAM 2 MG/ML
2 INJECTION INTRAMUSCULAR
Status: DISCONTINUED | OUTPATIENT
Start: 2023-03-03 | End: 2023-03-05 | Stop reason: HOSPADM

## 2023-03-03 RX ORDER — CETIRIZINE HYDROCHLORIDE 10 MG/1
10 TABLET ORAL DAILY
Status: CANCELLED | OUTPATIENT
Start: 2023-03-03

## 2023-03-03 RX ORDER — SODIUM CHLORIDE 0.9 % (FLUSH) 0.9 %
10 SYRINGE (ML) INJECTION EVERY 12 HOURS SCHEDULED
Status: DISCONTINUED | OUTPATIENT
Start: 2023-03-03 | End: 2023-03-05 | Stop reason: HOSPADM

## 2023-03-03 RX ORDER — LORAZEPAM 2 MG/ML
1 INJECTION INTRAMUSCULAR
Status: DISCONTINUED | OUTPATIENT
Start: 2023-03-03 | End: 2023-03-05 | Stop reason: HOSPADM

## 2023-03-03 RX ORDER — LORATADINE 10 MG/1
10 TABLET ORAL DAILY PRN
COMMUNITY

## 2023-03-03 RX ORDER — LORAZEPAM 1 MG/1
1 TABLET ORAL
Status: DISCONTINUED | OUTPATIENT
Start: 2023-03-03 | End: 2023-03-05 | Stop reason: HOSPADM

## 2023-03-03 RX ORDER — SODIUM CHLORIDE, SODIUM LACTATE, POTASSIUM CHLORIDE, CALCIUM CHLORIDE 600; 310; 30; 20 MG/100ML; MG/100ML; MG/100ML; MG/100ML
125 INJECTION, SOLUTION INTRAVENOUS CONTINUOUS
Status: DISCONTINUED | OUTPATIENT
Start: 2023-03-03 | End: 2023-03-04

## 2023-03-03 RX ORDER — MORPHINE SULFATE 2 MG/ML
2 INJECTION, SOLUTION INTRAMUSCULAR; INTRAVENOUS ONCE
Status: COMPLETED | OUTPATIENT
Start: 2023-03-03 | End: 2023-03-03

## 2023-03-03 RX ORDER — THIAMINE HYDROCHLORIDE 100 MG/ML
100 INJECTION, SOLUTION INTRAMUSCULAR; INTRAVENOUS ONCE
Status: COMPLETED | OUTPATIENT
Start: 2023-03-03 | End: 2023-03-03

## 2023-03-03 RX ORDER — SODIUM CHLORIDE 9 MG/ML
40 INJECTION, SOLUTION INTRAVENOUS AS NEEDED
Status: DISCONTINUED | OUTPATIENT
Start: 2023-03-03 | End: 2023-03-05 | Stop reason: HOSPADM

## 2023-03-03 RX ORDER — HYDROMORPHONE HYDROCHLORIDE 1 MG/ML
0.5 INJECTION, SOLUTION INTRAMUSCULAR; INTRAVENOUS; SUBCUTANEOUS
Status: DISCONTINUED | OUTPATIENT
Start: 2023-03-03 | End: 2023-03-04

## 2023-03-03 RX ORDER — FOLIC ACID 1 MG/1
1 TABLET ORAL DAILY
Status: DISCONTINUED | OUTPATIENT
Start: 2023-03-04 | End: 2023-03-05 | Stop reason: HOSPADM

## 2023-03-03 RX ORDER — COLCHICINE 0.6 MG/1
0.6 TABLET ORAL DAILY PRN
Status: CANCELLED | OUTPATIENT
Start: 2023-03-03

## 2023-03-03 RX ORDER — ASPIRIN 325 MG
325 TABLET ORAL ONCE
Status: COMPLETED | OUTPATIENT
Start: 2023-03-03 | End: 2023-03-03

## 2023-03-03 RX ORDER — KETOROLAC TROMETHAMINE 30 MG/ML
30 INJECTION, SOLUTION INTRAMUSCULAR; INTRAVENOUS ONCE
Status: COMPLETED | OUTPATIENT
Start: 2023-03-03 | End: 2023-03-03

## 2023-03-03 RX ORDER — NALOXONE HCL 0.4 MG/ML
0.4 VIAL (ML) INJECTION
Status: DISCONTINUED | OUTPATIENT
Start: 2023-03-03 | End: 2023-03-05 | Stop reason: HOSPADM

## 2023-03-03 RX ORDER — NITROGLYCERIN 0.4 MG/1
0.4 TABLET SUBLINGUAL
Status: DISCONTINUED | OUTPATIENT
Start: 2023-03-03 | End: 2023-03-05 | Stop reason: HOSPADM

## 2023-03-03 RX ORDER — LORAZEPAM 2 MG/1
2 TABLET ORAL
Status: DISCONTINUED | OUTPATIENT
Start: 2023-03-03 | End: 2023-03-05 | Stop reason: HOSPADM

## 2023-03-03 RX ORDER — POTASSIUM CHLORIDE 20 MEQ/1
40 TABLET, EXTENDED RELEASE ORAL ONCE
Status: COMPLETED | OUTPATIENT
Start: 2023-03-03 | End: 2023-03-03

## 2023-03-03 RX ORDER — COLCHICINE 0.6 MG/1
0.6 TABLET ORAL DAILY PRN
COMMUNITY

## 2023-03-03 RX ORDER — DIPHENHYDRAMINE HYDROCHLORIDE AND LIDOCAINE HYDROCHLORIDE AND ALUMINUM HYDROXIDE AND MAGNESIUM HYDRO
5 KIT EVERY 6 HOURS SCHEDULED
Status: DISCONTINUED | OUTPATIENT
Start: 2023-03-03 | End: 2023-03-05 | Stop reason: HOSPADM

## 2023-03-03 RX ORDER — PANTOPRAZOLE SODIUM 40 MG/1
40 TABLET, DELAYED RELEASE ORAL
Status: DISCONTINUED | OUTPATIENT
Start: 2023-03-04 | End: 2023-03-05 | Stop reason: HOSPADM

## 2023-03-03 RX ORDER — SODIUM CHLORIDE 9 MG/ML
75 INJECTION, SOLUTION INTRAVENOUS CONTINUOUS
Status: DISCONTINUED | OUTPATIENT
Start: 2023-03-03 | End: 2023-03-05 | Stop reason: HOSPADM

## 2023-03-03 RX ORDER — METHION/INOS/CHOL BT/B COM/LIV 110MG-86MG
100 CAPSULE ORAL ONCE
Status: COMPLETED | OUTPATIENT
Start: 2023-03-03 | End: 2023-03-03

## 2023-03-03 RX ADMIN — DIPHENHYDRAMINE HYDROCHLORIDE AND LIDOCAINE HYDROCHLORIDE AND ALUMINUM HYDROXIDE AND MAGNESIUM HYDRO 5 ML: KIT at 16:03

## 2023-03-03 RX ADMIN — POTASSIUM CHLORIDE 40 MEQ: 20 TABLET, EXTENDED RELEASE ORAL at 14:43

## 2023-03-03 RX ADMIN — ENOXAPARIN SODIUM 40 MG: 40 INJECTION SUBCUTANEOUS at 18:23

## 2023-03-03 RX ADMIN — IOPAMIDOL 60 ML: 755 INJECTION, SOLUTION INTRAVENOUS at 08:17

## 2023-03-03 RX ADMIN — ONDANSETRON 4 MG: 2 INJECTION INTRAMUSCULAR; INTRAVENOUS at 11:39

## 2023-03-03 RX ADMIN — HYDROMORPHONE HYDROCHLORIDE 0.5 MG: 1 INJECTION, SOLUTION INTRAMUSCULAR; INTRAVENOUS; SUBCUTANEOUS at 23:05

## 2023-03-03 RX ADMIN — Medication 100 MG: at 18:23

## 2023-03-03 RX ADMIN — HYDROMORPHONE HYDROCHLORIDE 0.5 MG: 1 INJECTION, SOLUTION INTRAMUSCULAR; INTRAVENOUS; SUBCUTANEOUS at 20:41

## 2023-03-03 RX ADMIN — SODIUM CHLORIDE 150 ML/HR: 9 INJECTION, SOLUTION INTRAVENOUS at 18:23

## 2023-03-03 RX ADMIN — THIAMINE HYDROCHLORIDE 100 MG: 100 INJECTION, SOLUTION INTRAMUSCULAR; INTRAVENOUS at 11:39

## 2023-03-03 RX ADMIN — KETOROLAC TROMETHAMINE 30 MG: 30 INJECTION, SOLUTION INTRAMUSCULAR at 14:44

## 2023-03-03 RX ADMIN — VANCOMYCIN HYDROCHLORIDE 1750 MG: 5 INJECTION, POWDER, LYOPHILIZED, FOR SOLUTION INTRAVENOUS at 09:16

## 2023-03-03 RX ADMIN — ASPIRIN 325 MG: 325 TABLET ORAL at 05:58

## 2023-03-03 RX ADMIN — DIPHENHYDRAMINE HYDROCHLORIDE AND LIDOCAINE HYDROCHLORIDE AND ALUMINUM HYDROXIDE AND MAGNESIUM HYDRO 5 ML: KIT at 23:41

## 2023-03-03 RX ADMIN — PIPERACILLIN SODIUM AND TAZOBACTAM SODIUM 3.38 G: 3; .375 INJECTION, POWDER, LYOPHILIZED, FOR SOLUTION INTRAVENOUS at 08:30

## 2023-03-03 RX ADMIN — IOPAMIDOL 70 ML: 755 INJECTION, SOLUTION INTRAVENOUS at 08:18

## 2023-03-03 RX ADMIN — SODIUM CHLORIDE, POTASSIUM CHLORIDE, SODIUM LACTATE AND CALCIUM CHLORIDE 125 ML/HR: 600; 310; 30; 20 INJECTION, SOLUTION INTRAVENOUS at 14:44

## 2023-03-03 RX ADMIN — HYDROMORPHONE HYDROCHLORIDE 0.5 MG: 1 INJECTION, SOLUTION INTRAMUSCULAR; INTRAVENOUS; SUBCUTANEOUS at 18:41

## 2023-03-03 RX ADMIN — Medication 10 ML: at 20:41

## 2023-03-03 RX ADMIN — MORPHINE SULFATE 2 MG: 2 INJECTION, SOLUTION INTRAMUSCULAR; INTRAVENOUS at 11:39

## 2023-03-03 RX ADMIN — SODIUM CHLORIDE, POTASSIUM CHLORIDE, SODIUM LACTATE AND CALCIUM CHLORIDE 2721 ML: 600; 310; 30; 20 INJECTION, SOLUTION INTRAVENOUS at 08:30

## 2023-03-03 RX ADMIN — POTASSIUM CHLORIDE 40 MEQ: 20 TABLET, EXTENDED RELEASE ORAL at 08:29

## 2023-03-03 RX ADMIN — COLCHICINE 0.6 MG: 0.6 TABLET ORAL at 20:40

## 2023-03-03 NOTE — ED PROVIDER NOTES
Subjective   History of Present Illness  Patient is a 47-year-old male with no significant past medical history presenting to the ER complaints of dizziness and not feeling well.  Patient reports that he drinks daily.  Patient states that he has had pancreatitis in the past and feels like he may have it again due to abdominal pain.  Patient states that he fell twice today and hit his head once.  Patient denies any chest pain, cough, fever, nausea, vomiting or any additional symptoms today.  Patient would not like to detox from alcohol.  Patient drinks approximately 12 beers a day.    History provided by:  Patient   used: No        Review of Systems   Constitutional: Negative.  Negative for fever.   HENT: Negative.    Respiratory: Negative.    Cardiovascular: Negative.  Negative for chest pain.   Gastrointestinal: Positive for abdominal pain.   Endocrine: Negative.    Genitourinary: Negative.  Negative for dysuria.   Skin: Negative.    Neurological: Positive for dizziness.   Psychiatric/Behavioral: Negative.    All other systems reviewed and are negative.      History reviewed. No pertinent past medical history.    No Known Allergies    Past Surgical History:   Procedure Laterality Date   • HAND SURGERY         Family History   Problem Relation Age of Onset   • No Known Problems Mother    • No Known Problems Father        Social History     Socioeconomic History   • Marital status:    Tobacco Use   • Smoking status: Never   • Smokeless tobacco: Current     Types: Snuff   Vaping Use   • Vaping Use: Never used   Substance and Sexual Activity   • Alcohol use: Yes     Comment: occasionally   • Drug use: Never   • Sexual activity: Defer           Objective   Physical Exam  Vitals and nursing note reviewed.   Constitutional:       General: He is not in acute distress.     Appearance: He is well-developed. He is obese. He is not diaphoretic.   HENT:      Head: Normocephalic and atraumatic.       Right Ear: External ear normal.      Left Ear: External ear normal.      Nose: Nose normal.   Eyes:      Conjunctiva/sclera: Conjunctivae normal.      Pupils: Pupils are equal, round, and reactive to light.   Neck:      Vascular: No JVD.      Trachea: No tracheal deviation.   Cardiovascular:      Rate and Rhythm: Regular rhythm. Tachycardia present.      Heart sounds: Normal heart sounds. No murmur heard.  Pulmonary:      Effort: Pulmonary effort is normal. No respiratory distress.      Breath sounds: Normal breath sounds. No wheezing.   Abdominal:      General: Bowel sounds are normal.      Palpations: Abdomen is soft.      Tenderness: There is no abdominal tenderness.   Musculoskeletal:         General: No deformity. Normal range of motion.      Cervical back: Normal range of motion and neck supple.   Skin:     General: Skin is warm and dry.      Capillary Refill: Capillary refill takes less than 2 seconds.      Coloration: Skin is jaundiced. Skin is not pale.      Findings: No erythema or rash.   Neurological:      Mental Status: He is alert and oriented to person, place, and time.      Cranial Nerves: No cranial nerve deficit.   Psychiatric:         Mood and Affect: Mood normal.         Behavior: Behavior normal.         Thought Content: Thought content normal.         Procedures       US Abdomen Limited   Final Result     No acute findings in the right upper quadrant.       This report was finalized on 3/3/2023 12:40 PM by Dr. Ancelmo Hughes MD.          CT Abdomen Pelvis With Contrast   Final Result     Nonspecific urinary bladder wall thickening that could be related to   chronic outlet obstruction.       This report was finalized on 3/3/2023 8:25 AM by Dr. Ancelmo Hughes MD.          CT Angiogram Chest Pulmonary Embolism   Final Result   1.  No pulmonary embolism.   2.  Atelectasis in the lung bases.       This report was finalized on 3/3/2023 8:38 AM by Dr. Ancelmo Hughes MD.          CT Head Without Contrast    Final Result   No acute intracranial abnormality. Right maxillary sinus mucosal thickening.               Signer Name: Obi Dong MD    Signed: 3/3/2023 5:06 AM    Workstation Name: [facility] [place] [facility]     Radiology [facility]      XR Chest 2 View   Final Result   No acute cardiopulmonary findings.      Signer Name: Obi Dong MD    Signed: 3/3/2023 5:08 AM    Workstation Name: [facility]        Results for orders placed or performed during the hospital encounter of 03/03/23   COVID-19 and FLU A/B PCR - Swab, Nasopharynx    Specimen: Nasopharynx; Swab   Result Value Ref Range    COVID19 Not Detected Not Detected - Ref. Range    Influenza A PCR Not Detected Not Detected    Influenza B PCR Not Detected Not Detected   Comprehensive Metabolic Panel    Specimen: Arm, Right; Blood   Result Value Ref Range    Glucose 121 (H) 65 - 99 mg/dL    BUN 7 6 - 20 mg/dL    Creatinine 0.96 0.76 - 1.27 mg/dL    Sodium 139 136 - 145 mmol/L    Potassium 3.3 (L) 3.5 - 5.2 mmol/L    Chloride 100 98 - 107 mmol/L    CO2 23.6 22.0 - 29.0 mmol/L    Calcium 9.3 8.6 - 10.5 mg/dL    Total Protein 7.9 6.0 - 8.5 g/dL    Albumin 4.1 3.5 - 5.2 g/dL    ALT (SGPT) 22 1 - 41 U/L    AST (SGOT) 40 1 - 40 U/L    Alkaline Phosphatase 190 (H) 39 - 117 U/L    Total Bilirubin 1.1 0.0 - 1.2 mg/dL    Globulin 3.8 gm/dL    A/G Ratio 1.1 g/dL    BUN/Creatinine Ratio 7.3 7.0 - 25.0    Anion Gap 15.4 (H) 5.0 - 15.0 mmol/L    eGFR 96.9 >60.0 mL/min/1.73   High Sensitivity Troponin T    Specimen: Arm, Right; Blood   Result Value Ref Range    HS Troponin T 9 <15 ng/L   CBC Auto Differential    Specimen: Arm, Right; Blood   Result Value Ref Range    WBC 12.18 (H) 3.40 - 10.80 10*3/mm3    RBC 4.10 (L) 4.14 - 5.80 10*6/mm3    Hemoglobin 14.0 13.0 - 17.7 g/dL    Hematocrit 41.3 37.5 - 51.0 %    .7 (H) 79.0 - 97.0 fL    MCH 34.1 (H) 26.6 - 33.0 pg    MCHC 33.9 31.5 - 35.7 g/dL    RDW 15.7 (H) 12.3 - 15.4 %     RDW-SD 57.3 (H) 37.0 - 54.0 fl    MPV 10.1 6.0 - 12.0 fL    Platelets 192 140 - 450 10*3/mm3    Neutrophil % 82.3 (H) 42.7 - 76.0 %    Lymphocyte % 10.3 (L) 19.6 - 45.3 %    Monocyte % 6.6 5.0 - 12.0 %    Eosinophil % 0.1 (L) 0.3 - 6.2 %    Basophil % 0.2 0.0 - 1.5 %    Immature Grans % 0.5 0.0 - 0.5 %    Neutrophils, Absolute 10.04 (H) 1.70 - 7.00 10*3/mm3    Lymphocytes, Absolute 1.25 0.70 - 3.10 10*3/mm3    Monocytes, Absolute 0.80 0.10 - 0.90 10*3/mm3    Eosinophils, Absolute 0.01 0.00 - 0.40 10*3/mm3    Basophils, Absolute 0.02 0.00 - 0.20 10*3/mm3    Immature Grans, Absolute 0.06 (H) 0.00 - 0.05 10*3/mm3    nRBC 0.0 0.0 - 0.2 /100 WBC   BNP    Specimen: Arm, Right; Blood   Result Value Ref Range    proBNP 249.0 0.0 - 450.0 pg/mL   D-dimer, Quantitative    Specimen: Arm, Right; Blood   Result Value Ref Range    D-Dimer, Quantitative 0.55 (H) 0.00 - 0.50 MCGFEU/mL   C-reactive Protein    Specimen: Arm, Right; Blood   Result Value Ref Range    C-Reactive Protein <0.30 0.00 - 0.50 mg/dL   Sedimentation Rate    Specimen: Arm, Right; Blood   Result Value Ref Range    Sed Rate 16 (H) 0 - 15 mm/hr   Lactic Acid, Plasma    Specimen: Arm, Right; Blood   Result Value Ref Range    Lactate 3.3 (C) 0.5 - 2.0 mmol/L   Magnesium    Specimen: Arm, Right; Blood   Result Value Ref Range    Magnesium 2.1 1.6 - 2.6 mg/dL   Lipase    Specimen: Arm, Right; Blood   Result Value Ref Range    Lipase 197 (H) 13 - 60 U/L   Ethanol    Specimen: Arm, Right; Blood   Result Value Ref Range    Ethanol <10 0 - 10 mg/dL    Ethanol % <0.010 %   Hepatitis Panel, Acute    Specimen: Arm, Right; Blood   Result Value Ref Range    Hepatitis B Surface Ag Non-Reactive Non-Reactive    Hep A IgM Non-Reactive Non-Reactive    Hep B C IgM Non-Reactive Non-Reactive    Hepatitis C Ab Non-Reactive Non-Reactive   STAT Lactic Acid, Reflex    Specimen: Hand, Right; Blood   Result Value Ref Range    Lactate 1.9 0.5 - 2.0 mmol/L   High Sensitivity Troponin T 2Hr     Specimen: Arm, Right; Blood   Result Value Ref Range    HS Troponin T 8 <15 ng/L    Troponin T Delta -1 >=-4 - <+4 ng/L   Urinalysis With Microscopic If Indicated (No Culture) - Urine, Clean Catch    Specimen: Urine, Clean Catch   Result Value Ref Range    Color, UA Yellow Yellow, Straw    Appearance, UA Clear Clear    pH, UA 6.0 5.0 - 8.0    Specific Gravity, UA >1.030 (H) 1.005 - 1.030    Glucose, UA Negative Negative    Ketones, UA Negative Negative    Bilirubin, UA Negative Negative    Blood, UA Negative Negative    Protein, UA Negative Negative    Leuk Esterase, UA Negative Negative    Nitrite, UA Negative Negative    Urobilinogen, UA 0.2 E.U./dL 0.2 - 1.0 E.U./dL   Urine Drug Screen - Urine, Clean Catch    Specimen: Urine, Clean Catch   Result Value Ref Range    THC, Screen, Urine Negative Negative    Phencyclidine (PCP), Urine Negative Negative    Cocaine Screen, Urine Negative Negative    Methamphetamine, Ur Negative Negative    Opiate Screen Positive (A) Negative    Amphetamine Screen, Urine Negative Negative    Benzodiazepine Screen, Urine Positive (A) Negative    Tricyclic Antidepressants Screen Negative Negative    Methadone Screen, Urine Negative Negative    Barbiturates Screen, Urine Negative Negative    Oxycodone Screen, Urine Negative Negative    Propoxyphene Screen Negative Negative    Buprenorphine, Screen, Urine Negative Negative   Amylase    Specimen: Arm, Right; Blood   Result Value Ref Range    Amylase 83 28 - 100 U/L   ECG 12 Lead ED Triage Standing Order; Chest Pain   Result Value Ref Range    QT Interval 298 ms    QTC Interval 435 ms   Green Top (Gel)   Result Value Ref Range    Extra Tube Hold for add-ons.    Lavender Top   Result Value Ref Range    Extra Tube hold for add-on    Gold Top - SST   Result Value Ref Range    Extra Tube Hold for add-ons.    Light Blue Top   Result Value Ref Range    Extra Tube Hold for add-ons.        ED Course  ED Course as of 03/03/23 1551   Fri Mar 03,  2023 0555 XR Chest 2 View [SM]   0555 CT Head Without Contrast [SM]   0638 EKG shows sinus tachycardia, rate 128.  KY interval 156, QRS duration 92, QTc 435 ms.  No apparent acute ischemia.  No evidence for STEMI. [CM]   0753 Endorsed to NEFTALY Riley at shift change  [SM]   0828 CT Abdomen Pelvis With Contrast  FINDINGS:    LUNG BASES:  Unremarkable.  No mass.  No consolidation.      ABDOMEN:    LIVER:  Unremarkable.  No mass.    GALLBLADDER AND BILE DUCTS:  Unremarkable.  No calcified stones.  No  ductal dilation.    PANCREAS:  Unremarkable.  No mass.  No ductal dilation.    SPLEEN:  Unremarkable.  No splenomegaly.    ADRENALS:  Unremarkable.  No mass.    KIDNEYS AND URETERS:  Unremarkable.  No solid mass.  No  hydronephrosis.    STOMACH AND BOWEL:  Unremarkable.  No obstruction.  No mucosal  thickening.      PELVIS:    APPENDIX:  No findings to suggest acute appendicitis.    BLADDER:  Nonspecific urinary bladder wall thickening that could be  related to chronic outlet obstruction.    REPRODUCTIVE:  Unremarkable as visualized.      ABDOMEN and PELVIS:    INTRAPERITONEAL SPACE:  Unremarkable.  No free air.  No significant  fluid collection.    BONES/JOINTS:  No acute fracture.  No dislocation.    SOFT TISSUES:  Unremarkable.    VASCULATURE:  Unremarkable.  No abdominal aortic aneurysm.    LYMPH NODES:  Unremarkable.  No enlarged lymph nodes.     IMPRESSION:    Nonspecific urinary bladder wall thickening that could be related to  chronic outlet obstruction. []   0843 CT Angiogram Chest Pulmonary Embolism  FINDINGS:    Pulmonary arteries:  Unremarkable.  No pulmonary embolism.    Aorta:  No acute findings.  No thoracic aortic aneurysm.    Lungs:  Atelectasis in the lung bases.  No mass.    Pleural space:  Unremarkable.  No significant effusion.  No  pneumothorax.    Heart:  Unremarkable.  No cardiomegaly.  No significant pericardial  effusion.  No evidence of RV dysfunction.    Bones/joints:  No acute fracture.   No dislocation.    Soft tissues:  Unremarkable.    Lymph nodes:  Unremarkable.  No enlarged lymph nodes.     IMPRESSION:  1.  No pulmonary embolism.  2.  Atelectasis in the lung bases. [AH]   1044 Dr. Christine to evaluate the patient [AH]   1256 US Abdomen Limited  FINDINGS:    Liver:  Unremarkable.  No mass.  No intrahepatic bile duct dilation.    Gallbladder:  Unremarkable.  No gallstones.    Common bile duct:  The CBD measures 2.43 mm.  No stones.  No dilation.    Pancreas:  Unremarkable as visualized.    Right kidney: Unremarkable.  No stones.  No solid mass.  No  hydronephrosis.     IMPRESSION:    No acute findings in the right upper quadrant. [AH]   1425 I assumed care of this patient at 8 AM today.  He has been personally seen and evaluated by myself and Dr. Christine.  The patient reports a 3-week history of nausea and vomiting with upper abdominal pain.  He states the pain is worse when he tries to eat.  He does have a history of alcohol use.  He states he drinks a pint of vodka, 12 beers or a bottle of wine daily.  He states he had stopped drinking for about 4 or 5 days due to his vomiting and abdominal pain but he was feeling better yesterday so he started drinking again.  He states he had about a pint yesterday evening which he reports is less than he typically drinks.  His wife is present with him and states last night around 11 PM he had an episode where he fell into the floor and she believes that he may have had a seizure.  She states he was shaking all over and foaming at the mouth.  She states about 2 hours later he had another similar episode.  The patient reports he does have a past history of alcohol withdrawal seizures.  He denies any withdrawal symptoms at this time.  He is awake and alert, no acute distress.  He is complaining of a headache and also shows me 2 ulcerations on each side of his tongue.  He states that started about 4 days ago after he had a fever. [AH]   1541 Dr. May to admit [AH]       ED Course User Index  [AH] Jessie Pérez PA  [CM] Mikey Lang MD  [SM] Mercedes Redding APRN                                           Medical Decision Making  Patient is a 47-year-old male with no significant past medical history presenting to the ER complaints of dizziness and not feeling well.  Patient reports that he drinks daily.  Patient states that he has had pancreatitis in the past and feels like he may have it again due to abdominal pain.  Patient states that he fell twice today and hit his head once.  Patient denies any chest pain, cough, fever, nausea, vomiting or any additional symptoms today.  Patient would not like to detox from alcohol.  Patient drinks approximately 12 beers a day.  I assumed care of the patient at 8 AM.  Patient received IV fluids and antibiotics.  He tolerated these well.  He received medication for a headache.  He has not had any seizure activity in the ED.  He has not had any alcohol withdrawal symptoms in the ED.  Dr. Christine saw and evaluated this patient as well.  He spoke with the hospitalist service who agrees on admission.    Alcohol-induced acute pancreatitis without infection or necrosis: acute illness or injury  Amount and/or Complexity of Data Reviewed  Labs: ordered.  Radiology: ordered. Decision-making details documented in ED Course.  ECG/medicine tests: ordered.  Discussion of management or test interpretation with external provider(s): Dr. May - hospitalist service    Risk  OTC drugs.  Prescription drug management.  Decision regarding hospitalization.          Final diagnoses:   Alcohol-induced acute pancreatitis without infection or necrosis       ED Disposition  ED Disposition     ED Disposition   Decision to Admit    Condition   --    Comment   Level of Care: Telemetry [5]   Diagnosis: Alcohol-induced acute pancreatitis without infection or necrosis [[id number]]   Admitting Physician: URSULA MAY [[id number]]   Certification: I Certify That  Inpatient Hospital Services Are Medically Necessary For Greater Than 2 Midnights               No follow-up provider specified.       Medication List      No changes were made to your prescriptions during this visit.          Jessie Pérez PA  03/03/23 3815

## 2023-03-03 NOTE — PLAN OF CARE
Goal Outcome Evaluation: Received patient from ER this afternoon. Patients VSS. Patient up with assist, steady gait noted. Patient currently resting in bed. Will continue with plan of care.

## 2023-03-03 NOTE — H&P
[facility] HOSPITALIST HISTORY AND PHYSICAL    Patient Identification:  Name:  Ruperto Maxwell  Age:  47 y.o.  Sex:  male  :  1975  MRN:  [id number]   Visit Number:  [id number]  Admit Date: 3/3/2023   Room number:  3305/2S  Primary Care Physician:  Tonya Arellano APRN    Date of Admission: 3/3/2023     Subjective     Chief complaint:    Chief Complaint   Patient presents with   • Chest Pain   • Syncope   • Headache     History of presenting illness:  47 y.o. male w/ PMH of Etoh Abuse, prior Etoh WD Seizures, Prior DT's, & Gout,   who presented w/ abdominal pain, seizure like episode, and n/v that begun about 3 weeks ago. Pt states he has drank 'a lot' in the past, but has been trying to quit/cut down in the last few weeks due to the N/V. Wife brought him into the ER @ 0548 this am, because last night he had about a 2 minute episode of shaking, associated w/ tongue biting but without bowel/bladder incontinence. Pt states he think he had another episode a night or two previously because he had an injury on the other side of his tongue.     He has been having some abdominal pain that was worse over the last 5 or so days, but reports he has been feeling better for the last two, and that he has recovered his appetite. His most recent meal was yesterday, and states he tolerated it well, and is hungry now.    Wife reports that he had some visual hallucinations last night, and he has had these hallucinations before when he was at [facility] for Alcohol wd (?Last year). She is concerned he may have an adverse reaction to ativan, because she recalls they gave him ativan and then he started having the hallucinations and then became agitated ripping all his IV's out and insisting on going home. Discussed how though we can't be sure, it was likely he was not being given enough ativan, and given he reports his Alcohol WD symptoms have been controlled previously with otpt valium, and they are both benzos,  any visual hallucinations he had around the same time he had Ativan, were most likely due to under treated delirium tremens.     Pt also has complaints of gout pain in his left toe, right knee, and left hip. As well as an acute on chronic headache w/ pressure behind both of his eyes like a sinus headache. CT head was negative, but showed some left maxillary sinus fullness.     Patient states his last drink was yesterday.    History obtained from review of records, check out from the ED provider, Wife at bedside and Patient   ---------------------------------------------------------------------------------------------------------------------   Review of Systems   Constitutional: Negative for activity change, appetite change, fatigue and fever.   HENT: Positive for congestion, sinus pressure and sinus pain.    Eyes: Negative.  Negative for photophobia.   Respiratory: Negative for cough, chest tightness, shortness of breath and wheezing.    Cardiovascular: Negative for chest pain, palpitations and leg swelling.   Gastrointestinal: Positive for abdominal pain, nausea and vomiting. Negative for constipation and diarrhea.   Endocrine: Negative.    Genitourinary: Negative.    Musculoskeletal: Positive for arthralgias and back pain.   Skin: Negative.    Allergic/Immunologic: Negative.    Neurological: Positive for tremors, seizures, syncope and headaches. Negative for facial asymmetry and light-headedness.   Hematological: Negative.    Psychiatric/Behavioral: Positive for hallucinations.     ---------------------------------------------------------------------------------------------------------------------   History reviewed. No pertinent past medical history.  Past Surgical History:   Procedure Laterality Date   • HAND SURGERY       Family History   Problem Relation Age of Onset   • No Known Problems Mother    • No Known Problems Father      Social History     Socioeconomic History   • Marital status:    Tobacco  Use   • Smoking status: Never   • Smokeless tobacco: Current     Types: Snuff   Vaping Use   • Vaping Use: Never used   Substance and Sexual Activity   • Alcohol use: Yes     Comment: 1 pint daily   • Drug use: Never   • Sexual activity: Defer     ---------------------------------------------------------------------------------------------------------------------   Allergies:  Patient has no known allergies.  ---------------------------------------------------------------------------------------------------------------------   Medications below are reported home medications pulling from within the system; at this time, these medications have not been reconciled unless otherwise specified and are in the verification process for further verifcation as current home medications.    Prior to Admission Medications     Prescriptions Last Dose Informant Patient Reported? Taking?    loratadine (CLARITIN) 10 MG tablet Past Month Family Member Yes Yes    Take 1 tablet by mouth Daily As Needed for Allergies.    omeprazole OTC (PriLOSEC OTC) 20 MG EC tablet Past Month Family Member Yes Yes    Take 1 tablet by mouth Daily As Needed (heartburn).    colchicine 0.6 MG tablet Unknown Pharmacy, Family Member Yes No    Take 1 tablet by mouth Daily As Needed (gout flares).          Objective     Vital Signs:  Temp:  [97.7 °F (36.5 °C)-98.4 °F (36.9 °C)] 97.9 °F (36.6 °C)  Heart Rate:  [] 73  Resp:  [16-18] 18  BP: ()/(61-98) 130/89    Mean Arterial Pressure (Non-Invasive) for the past 24 hrs (Last 3 readings):   Noninvasive MAP (mmHg)   03/03/23 1838 102   03/03/23 1703 111   03/03/23 1644 98     SpO2:  [91 %-99 %] 96 %  on   ;   Device (Oxygen Therapy): room air  Body mass index is 27.12 kg/m².    Wt Readings from Last 3 Encounters:   03/03/23 90.7 kg (200 lb)   01/10/22 89.4 kg (197 lb)   12/30/21 89.4 kg (197 lb)       ----------------------------------------------------------------------------------------------------------------------  Physical Exam  Vitals and nursing note reviewed.   Constitutional:       General: He is not in acute distress.     Appearance: Normal appearance. He is not ill-appearing, toxic-appearing or diaphoretic.      Comments: Age appropriate, well appearing middle aged gentleman, with dried vomitus on his sweat shirt   HENT:      Right Ear: External ear normal.      Left Ear: External ear normal.      Nose: Nose normal.      Mouth/Throat:      Mouth: Mucous membranes are moist.   Eyes:      General: No scleral icterus.     Extraocular Movements: Extraocular movements intact.      Conjunctiva/sclera: Conjunctivae normal.      Pupils: Pupils are equal, round, and reactive to light.   Cardiovascular:      Rate and Rhythm: Normal rate and regular rhythm.      Heart sounds: Normal heart sounds.   Pulmonary:      Effort: Pulmonary effort is normal. No respiratory distress.      Breath sounds: Normal breath sounds. No wheezing or rales.   Abdominal:      General: Bowel sounds are normal. There is no distension.      Palpations: Abdomen is soft.      Tenderness: There is no abdominal tenderness. There is no guarding.   Musculoskeletal:      Right lower leg: No edema.      Left lower leg: No edema.   Skin:     General: Skin is warm and dry.      Coloration: Skin is not jaundiced.   Neurological:      General: No focal deficit present.      Mental Status: He is alert and oriented to person, place, and time. Mental status is at baseline.      Cranial Nerves: No cranial nerve deficit.      Motor: No weakness.   Psychiatric:         Mood and Affect: Mood normal.         Behavior: Behavior normal.         Thought Content: Thought content normal.       ---------------------------------------------------------------------------------------------------------------------    Labs:  Results from last 7 days   Lab Units  03/03/23  0951 03/03/23  0610   LACTATE mmol/L 1.9 3.3*   SED RATE mm/hr  --  16*   CRP mg/dL  --  <0.30   WBC 10*3/mm3  --  12.18*   HEMOGLOBIN g/dL  --  14.0   HEMATOCRIT %  --  41.3   MCV fL  --  100.7*   MCHC g/dL  --  33.9   PLATELETS 10*3/mm3  --  192         Results from last 7 days   Lab Units 03/03/23  0610   SODIUM mmol/L 139   POTASSIUM mmol/L 3.3*   MAGNESIUM mg/dL 2.1   CHLORIDE mmol/L 100   CO2 mmol/L 23.6   BUN mg/dL 7   CREATININE mg/dL 0.96   CALCIUM mg/dL 9.3   GLUCOSE mg/dL 121*   ALBUMIN g/dL 4.1   BILIRUBIN mg/dL 1.1   ALK PHOS U/L 190*   AST (SGOT) U/L 40   ALT (SGPT) U/L 22   Estimated Creatinine Clearance: 119.4 mL/min (by C-G formula based on SCr of 0.96 mg/dL).  No results found for: AMMONIA  Results from last 7 days   Lab Units 03/03/23  0839 03/03/23  0610   HSTROP T ng/L 8 9   PROBNP pg/mL  --  249.0         No results found for: HGBA1C, POCGLU  No results found for: TSH, FREET4  No results found for: PREGTESTUR, PREGSERUM, HCG, HCGQUANT  Pain Management Panel     Pain Management Panel Latest Ref Rng & Units 3/3/2023    AMPHETAMINES SCREEN, URINE Negative Negative    BARBITURATES SCREEN Negative Negative    BENZODIAZEPINE SCREEN, URINE Negative Positive(A)    BUPRENORPHINEUR Negative Negative    COCAINE SCREEN, URINE Negative Negative    METHADONE SCREEN, URINE Negative Negative    METHAMPHETAMINEUR Negative Negative        Brief Urine Lab Results  (Last result in the past 365 days)      Color   Clarity   Blood   Leuk Est   Nitrite   Protein   CREAT   Urine HCG        03/03/23 1038 Yellow   Clear   Negative   Negative   Negative   Negative               No results found for: BLOODCX  No results found for: URINECX  No results found for: WOUNDCX  No results found for: STOOLCX    I have personally looked at the labs and they are summarized above.    ---------------------------------------------------------------------------------------------------------------------  EKG:   ECG 12 Lead ED  Triage Standing Order; Chest Pain   Final Result   Test Reason : ED Triage Standing Order~   Blood Pressure :   */*   mmHG   Vent. Rate : 128 BPM     Atrial Rate : 128 BPM      P-R Int : 156 ms          QRS Dur :  92 ms       QT Int : 298 ms       P-R-T Axes :  22   4  12 degrees      QTc Int : 435 ms      Sinus tachycardia   Otherwise normal ECG   No previous ECGs available   Confirmed by Eduard Wilburn ([id number]) on 3/3/2023 2:51:57 PM      Referred By: TOÑA           Confirmed By: Eduard Wilburn            Last echocardiogram:    --------------------------------------------------------------------------------------------------------------------  Detailed radiology reports for the last 24 hours:    Imaging Results (Last 24 Hours)     Procedure Component Value Units Date/Time    US Abdomen Limited [[id number]] Collected: 03/03/23 1240     Updated: 03/03/23 1242    Narrative:      EXAM:    US Abdomen Limited, Right Upper Quadrant     EXAM DATE:    3/3/2023 11:11 AM     CLINICAL HISTORY:    upper abd pain, vomiting, worse with eating     TECHNIQUE:    Real-time ultrasound of the right upper quadrant with image  documentation.     COMPARISON:    No relevant prior studies available.     FINDINGS:    Liver:  Unremarkable.  No mass.  No intrahepatic bile duct dilation.    Gallbladder:  Unremarkable.  No gallstones.    Common bile duct:  The CBD measures 2.43 mm.  No stones.  No dilation.    Pancreas:  Unremarkable as visualized.    Right kidney: Unremarkable.  No stones.  No solid mass.  No  hydronephrosis.       Impression:        No acute findings in the right upper quadrant.     This report was finalized on 3/3/2023 12:40 PM by Dr. Ancelmo Hughes MD.       CT Angiogram Chest Pulmonary Embolism [[id number]] Collected: 03/03/23 0837     Updated: 03/03/23 0840    Narrative:      EXAM:    CT Angiography Chest With Intravenous Contrast     EXAM DATE:    3/3/2023 7:17 AM     CLINICAL HISTORY:    Pulmonary embolism (PE) suspected,  high prob     TECHNIQUE:    Axial computed tomographic angiography images of the chest with  intravenous contrast.  This CT exam was performed using one or more of  the following dose reduction techniques:  automated exposure control,  adjustment of the mA and/or kV according to patient size, and/or use of  iterative reconstruction technique.    MIP reconstructed images were created and reviewed.     COMPARISON:    No relevant prior studies available.     FINDINGS:    Pulmonary arteries:  Unremarkable.  No pulmonary embolism.    Aorta:  No acute findings.  No thoracic aortic aneurysm.    Lungs:  Atelectasis in the lung bases.  No mass.    Pleural space:  Unremarkable.  No significant effusion.  No  pneumothorax.    Heart:  Unremarkable.  No cardiomegaly.  No significant pericardial  effusion.  No evidence of RV dysfunction.    Bones/joints:  No acute fracture.  No dislocation.    Soft tissues:  Unremarkable.    Lymph nodes:  Unremarkable.  No enlarged lymph nodes.       Impression:      1.  No pulmonary embolism.  2.  Atelectasis in the lung bases.     This report was finalized on 3/3/2023 8:38 AM by Dr. Ancelmo Hughes MD.       CT Abdomen Pelvis With Contrast [[id number]] Collected: 03/03/23 0823     Updated: 03/03/23 0827    Narrative:      EXAM:    CT Abdomen and Pelvis With Intravenous Contrast     EXAM DATE:    3/3/2023 7:17 AM     CLINICAL HISTORY:    abd pain     TECHNIQUE:    Axial computed tomography images of the abdomen and pelvis with  intravenous contrast.  Sagittal and coronal reformatted images were  created and reviewed.  This CT exam was performed using one or more of  the following dose reduction techniques:  automated exposure control,  adjustment of the mA and/or kV according to patient size, and/or use of  iterative reconstruction technique.     COMPARISON:    No relevant prior studies available.     FINDINGS:    LUNG BASES:  Unremarkable.  No mass.  No consolidation.      ABDOMEN:    LIVER:   Unremarkable.  No mass.    GALLBLADDER AND BILE DUCTS:  Unremarkable.  No calcified stones.  No  ductal dilation.    PANCREAS:  Unremarkable.  No mass.  No ductal dilation.    SPLEEN:  Unremarkable.  No splenomegaly.    ADRENALS:  Unremarkable.  No mass.    KIDNEYS AND URETERS:  Unremarkable.  No solid mass.  No  hydronephrosis.    STOMACH AND BOWEL:  Unremarkable.  No obstruction.  No mucosal  thickening.      PELVIS:    APPENDIX:  No findings to suggest acute appendicitis.    BLADDER:  Nonspecific urinary bladder wall thickening that could be  related to chronic outlet obstruction.    REPRODUCTIVE:  Unremarkable as visualized.      ABDOMEN and PELVIS:    INTRAPERITONEAL SPACE:  Unremarkable.  No free air.  No significant  fluid collection.    BONES/JOINTS:  No acute fracture.  No dislocation.    SOFT TISSUES:  Unremarkable.    VASCULATURE:  Unremarkable.  No abdominal aortic aneurysm.    LYMPH NODES:  Unremarkable.  No enlarged lymph nodes.       Impression:        Nonspecific urinary bladder wall thickening that could be related to  chronic outlet obstruction.     This report was finalized on 3/3/2023 8:25 AM by Dr. Ancelmo Hughes MD.       XR Chest 2 View [[id number]] Collected: 03/03/23 0508     Updated: 03/03/23 0510    Narrative:      CR Chest 2 Vws    INDICATION:    Chest pain    COMPARISON:    None.    FINDINGS:   PA and lateral views of the chest.  Borderline heart size. Mediastinal contours are normal. The lungs are clear. No pneumothorax or pleural effusion.        Impression:      No acute cardiopulmonary findings.    Signer Name: Obi Dong MD   Signed: 3/3/2023 5:08 AM   Workstation Name: [facility]    [facility]    CT Head Without Contrast [[id number]] Collected: 03/03/23 0506     Updated: 03/03/23 0508    Narrative:      CT Head WO    HISTORY:   Headache. Fall with posterior head trauma. Dizziness.    TECHNIQUE:   Routine noncontrast head CT. Coronal and sagittal  reformatted images. Radiation dose reduction techniques included automated exposure control or exposure modulation based on body size. Count of known CT and cardiac nuc med studies performed in previous  12 months: 0.     COMPARISON:   None available.    FINDINGS:   No hemorrhage, acute infarction, mass lesion, or abnormal extra-axial fluid collection. No midline shift or focal mass effect. Ventricular system is normal in size and configuration. No acute osseous abnormality. Mucosal thickening right maxillary sinus.  Visualized mastoid air cells are clear.      Impression:      No acute intracranial abnormality. Right maxillary sinus mucosal thickening.          Signer Name: Obi Dong MD   Signed: 3/3/2023 5:06 AM   Workstation [facility]: [facility]    Radiology [facility]        Final impressions for the last 30 days of radiology reports:    XR Chest 2 View    Result Date: 3/3/2023  No acute cardiopulmonary findings. Signer Name: Obi Dong MD  Signed: 3/3/2023 5:08 AM  Workstation Name: [facility]  Radiology [facility]    CT Head Without Contrast    Result Date: 3/3/2023  No acute intracranial abnormality. Right maxillary sinus mucosal thickening. Signer Name: Obi Dong MD  Signed: 3/3/2023 5:06 AM  Workstation Name: [facility]  Radiology [facility]    CT Abdomen Pelvis With Contrast    Result Date: 3/3/2023    Nonspecific urinary bladder wall thickening that could be related to chronic outlet obstruction.  This report was finalized on 3/3/2023 8:25 AM by Dr. Ancelmo Hughes MD.      CT Angiogram Chest Pulmonary Embolism    Result Date: 3/3/2023  1.  No pulmonary embolism. 2.  Atelectasis in the lung bases.  This report was finalized on 3/3/2023 8:38 AM by Dr. Ancelmo Hughes MD.      US Abdomen Limited    Result Date: 3/3/2023    No acute findings in the right upper quadrant.  This report was finalized on 3/3/2023 12:40 PM by Dr. Ancelmo Hughes MD.      I  have personally read the final radiology reports.      Assessment & Plan       VTE Prophylaxis:   Mechanical Order History:     None      Pharmalogical Order History:      Ordered     Dose Route Frequency Stop    03/03/23 9227  Enoxaparin Sodium (LOVENOX) syringe 40 mg         40 mg SC Every 24 Hours --                #Acute Pancreatitis, likely ETOH  - LFT's not suggestive of gallstone/obstrucitve  - States he has an appetite  - However, will monitor overnight w/ NPO, Pain meds, and fluids  - If lipase trend is down, and still hungry in AM, plan would be for transition to PO pain medication w/ advance as tolerated diet  - Check A1c and lipid panel    #Alcohol WD Seizures  - CIWA, currently with good sensorium will not schedule any medications  - Pt reported to ED that he was not yet interested in completely quitting  - Pt would benefit from substance abuse consuling, wife may benefit from support groups/consuling as well    #Leukocytosis  #Sinus Headache  #Left sinus fullness  - Got zosyn in the ER, no other infectious source found  - Leukocytosis could be from seizure, monitor, but may need treatment for acute sinusitis    #Gout - colchicine daily    Admission Status:   Inpatient Expect > 2 midnights  DVT PPX: Lovenox  Disposition: Home    Dimitris May MD  [facility] Hospitalist  03/03/23  18:49 EST

## 2023-03-04 LAB
ALBUMIN SERPL-MCNC: 3.1 G/DL (ref 3.5–5.2)
ALBUMIN/GLOB SERPL: 1.1 G/DL
ALP SERPL-CCNC: 134 U/L (ref 39–117)
ALT SERPL W P-5'-P-CCNC: 17 U/L (ref 1–41)
AMYLASE SERPL-CCNC: 66 U/L (ref 28–100)
ANION GAP SERPL CALCULATED.3IONS-SCNC: 8.2 MMOL/L (ref 5–15)
AST SERPL-CCNC: 37 U/L (ref 1–40)
BASOPHILS # BLD AUTO: 0.05 10*3/MM3 (ref 0–0.2)
BASOPHILS NFR BLD AUTO: 1.1 % (ref 0–1.5)
BILIRUB SERPL-MCNC: 0.9 MG/DL (ref 0–1.2)
BUN SERPL-MCNC: 5 MG/DL (ref 6–20)
BUN/CREAT SERPL: 6.8 (ref 7–25)
CALCIUM SPEC-SCNC: 8.3 MG/DL (ref 8.6–10.5)
CHLORIDE SERPL-SCNC: 107 MMOL/L (ref 98–107)
CHOLEST SERPL-MCNC: 211 MG/DL (ref 0–200)
CO2 SERPL-SCNC: 25.8 MMOL/L (ref 22–29)
CREAT SERPL-MCNC: 0.73 MG/DL (ref 0.76–1.27)
DEPRECATED RDW RBC AUTO: 59.5 FL (ref 37–54)
EGFRCR SERPLBLD CKD-EPI 2021: 111.5 ML/MIN/1.73
EOSINOPHIL # BLD AUTO: 0.13 10*3/MM3 (ref 0–0.4)
EOSINOPHIL NFR BLD AUTO: 2.8 % (ref 0.3–6.2)
ERYTHROCYTE [DISTWIDTH] IN BLOOD BY AUTOMATED COUNT: 16.1 % (ref 12.3–15.4)
GLOBULIN UR ELPH-MCNC: 2.7 GM/DL
GLUCOSE SERPL-MCNC: 88 MG/DL (ref 65–99)
HBA1C MFR BLD: 4.4 % (ref 4.8–5.6)
HCT VFR BLD AUTO: 32.6 % (ref 37.5–51)
HDLC SERPL-MCNC: 52 MG/DL (ref 40–60)
HGB BLD-MCNC: 10.9 G/DL (ref 13–17.7)
IMM GRANULOCYTES # BLD AUTO: 0.01 10*3/MM3 (ref 0–0.05)
IMM GRANULOCYTES NFR BLD AUTO: 0.2 % (ref 0–0.5)
INR PPP: 1.08 (ref 0.9–1.1)
LDLC SERPL CALC-MCNC: 134 MG/DL (ref 0–100)
LDLC/HDLC SERPL: 2.52 {RATIO}
LIPASE SERPL-CCNC: 126 U/L (ref 13–60)
LYMPHOCYTES # BLD AUTO: 1.74 10*3/MM3 (ref 0.7–3.1)
LYMPHOCYTES NFR BLD AUTO: 38 % (ref 19.6–45.3)
MAGNESIUM SERPL-MCNC: 1.8 MG/DL (ref 1.6–2.6)
MCH RBC QN AUTO: 34 PG (ref 26.6–33)
MCHC RBC AUTO-ENTMCNC: 33.4 G/DL (ref 31.5–35.7)
MCV RBC AUTO: 101.6 FL (ref 79–97)
MONOCYTES # BLD AUTO: 0.38 10*3/MM3 (ref 0.1–0.9)
MONOCYTES NFR BLD AUTO: 8.3 % (ref 5–12)
NEUTROPHILS NFR BLD AUTO: 2.27 10*3/MM3 (ref 1.7–7)
NEUTROPHILS NFR BLD AUTO: 49.6 % (ref 42.7–76)
NRBC BLD AUTO-RTO: 0 /100 WBC (ref 0–0.2)
PHOSPHATE SERPL-MCNC: 3 MG/DL (ref 2.5–4.5)
PLATELET # BLD AUTO: 121 10*3/MM3 (ref 140–450)
PMV BLD AUTO: 10.4 FL (ref 6–12)
POTASSIUM SERPL-SCNC: 3.7 MMOL/L (ref 3.5–5.2)
PROCALCITONIN SERPL-MCNC: 0.08 NG/ML (ref 0–0.25)
PROT SERPL-MCNC: 5.8 G/DL (ref 6–8.5)
PROTHROMBIN TIME: 14.2 SECONDS (ref 12.1–14.7)
RBC # BLD AUTO: 3.21 10*6/MM3 (ref 4.14–5.8)
SODIUM SERPL-SCNC: 141 MMOL/L (ref 136–145)
T4 FREE SERPL-MCNC: 1.23 NG/DL (ref 0.93–1.7)
TRIGL SERPL-MCNC: 141 MG/DL (ref 0–150)
TSH SERPL DL<=0.05 MIU/L-ACNC: 3.04 UIU/ML (ref 0.27–4.2)
VLDLC SERPL-MCNC: 25 MG/DL (ref 5–40)
WBC NRBC COR # BLD: 4.58 10*3/MM3 (ref 3.4–10.8)

## 2023-03-04 PROCEDURE — 96376 TX/PRO/DX INJ SAME DRUG ADON: CPT

## 2023-03-04 PROCEDURE — 96361 HYDRATE IV INFUSION ADD-ON: CPT

## 2023-03-04 PROCEDURE — 82150 ASSAY OF AMYLASE: CPT | Performed by: INTERNAL MEDICINE

## 2023-03-04 PROCEDURE — 99232 SBSQ HOSP IP/OBS MODERATE 35: CPT | Performed by: INTERNAL MEDICINE

## 2023-03-04 PROCEDURE — 25010000002 HYDROMORPHONE PER 4 MG: Performed by: INTERNAL MEDICINE

## 2023-03-04 PROCEDURE — 83036 HEMOGLOBIN GLYCOSYLATED A1C: CPT | Performed by: INTERNAL MEDICINE

## 2023-03-04 PROCEDURE — 80061 LIPID PANEL: CPT | Performed by: INTERNAL MEDICINE

## 2023-03-04 PROCEDURE — 84145 PROCALCITONIN (PCT): CPT | Performed by: INTERNAL MEDICINE

## 2023-03-04 PROCEDURE — G0378 HOSPITAL OBSERVATION PER HR: HCPCS

## 2023-03-04 PROCEDURE — 80050 GENERAL HEALTH PANEL: CPT | Performed by: INTERNAL MEDICINE

## 2023-03-04 PROCEDURE — 83735 ASSAY OF MAGNESIUM: CPT | Performed by: INTERNAL MEDICINE

## 2023-03-04 PROCEDURE — 0 MAGNESIUM SULFATE 4 GM/100ML SOLUTION: Performed by: INTERNAL MEDICINE

## 2023-03-04 PROCEDURE — 96372 THER/PROPH/DIAG INJ SC/IM: CPT

## 2023-03-04 PROCEDURE — 83690 ASSAY OF LIPASE: CPT | Performed by: INTERNAL MEDICINE

## 2023-03-04 PROCEDURE — 84100 ASSAY OF PHOSPHORUS: CPT | Performed by: INTERNAL MEDICINE

## 2023-03-04 PROCEDURE — 25010000002 ENOXAPARIN PER 10 MG: Performed by: INTERNAL MEDICINE

## 2023-03-04 PROCEDURE — 85610 PROTHROMBIN TIME: CPT | Performed by: INTERNAL MEDICINE

## 2023-03-04 PROCEDURE — 84439 ASSAY OF FREE THYROXINE: CPT | Performed by: INTERNAL MEDICINE

## 2023-03-04 RX ORDER — MAGNESIUM SULFATE HEPTAHYDRATE 40 MG/ML
4 INJECTION, SOLUTION INTRAVENOUS ONCE
Status: COMPLETED | OUTPATIENT
Start: 2023-03-04 | End: 2023-03-04

## 2023-03-04 RX ORDER — MAGNESIUM SULFATE HEPTAHYDRATE 40 MG/ML
4 INJECTION, SOLUTION INTRAVENOUS AS NEEDED
Status: DISCONTINUED | OUTPATIENT
Start: 2023-03-04 | End: 2023-03-05 | Stop reason: HOSPADM

## 2023-03-04 RX ORDER — MAGNESIUM SULFATE HEPTAHYDRATE 40 MG/ML
2 INJECTION, SOLUTION INTRAVENOUS AS NEEDED
Status: DISCONTINUED | OUTPATIENT
Start: 2023-03-04 | End: 2023-03-05 | Stop reason: HOSPADM

## 2023-03-04 RX ORDER — HYDROCODONE BITARTRATE AND ACETAMINOPHEN 5; 325 MG/1; MG/1
1 TABLET ORAL EVERY 8 HOURS PRN
Status: DISCONTINUED | OUTPATIENT
Start: 2023-03-04 | End: 2023-03-05 | Stop reason: HOSPADM

## 2023-03-04 RX ORDER — POTASSIUM CHLORIDE 1500 MG/1
40 TABLET, FILM COATED, EXTENDED RELEASE ORAL AS NEEDED
Status: DISCONTINUED | OUTPATIENT
Start: 2023-03-04 | End: 2023-03-05 | Stop reason: HOSPADM

## 2023-03-04 RX ORDER — MORPHINE SULFATE 2 MG/ML
1 INJECTION, SOLUTION INTRAMUSCULAR; INTRAVENOUS EVERY 4 HOURS PRN
Status: DISCONTINUED | OUTPATIENT
Start: 2023-03-04 | End: 2023-03-05 | Stop reason: HOSPADM

## 2023-03-04 RX ORDER — POTASSIUM CHLORIDE 1.5 G/1.77G
40 POWDER, FOR SOLUTION ORAL AS NEEDED
Status: DISCONTINUED | OUTPATIENT
Start: 2023-03-04 | End: 2023-03-05 | Stop reason: HOSPADM

## 2023-03-04 RX ORDER — POTASSIUM CHLORIDE 7.45 MG/ML
10 INJECTION INTRAVENOUS
Status: DISCONTINUED | OUTPATIENT
Start: 2023-03-04 | End: 2023-03-05 | Stop reason: HOSPADM

## 2023-03-04 RX ORDER — LIDOCAINE HYDROCHLORIDE 20 MG/ML
10 SOLUTION OROPHARYNGEAL
Status: DISCONTINUED | OUTPATIENT
Start: 2023-03-04 | End: 2023-03-05 | Stop reason: HOSPADM

## 2023-03-04 RX ADMIN — DIPHENHYDRAMINE HYDROCHLORIDE AND LIDOCAINE HYDROCHLORIDE AND ALUMINUM HYDROXIDE AND MAGNESIUM HYDRO 5 ML: KIT at 23:53

## 2023-03-04 RX ADMIN — COLCHICINE 0.6 MG: 0.6 TABLET ORAL at 08:23

## 2023-03-04 RX ADMIN — LIDOCAINE HYDROCHLORIDE 10 ML: 20 SOLUTION TOPICAL at 23:51

## 2023-03-04 RX ADMIN — HYDROCODONE BITARTRATE AND ACETAMINOPHEN 1 TABLET: 5; 325 TABLET ORAL at 23:53

## 2023-03-04 RX ADMIN — SODIUM CHLORIDE 150 ML/HR: 9 INJECTION, SOLUTION INTRAVENOUS at 02:17

## 2023-03-04 RX ADMIN — PANTOPRAZOLE SODIUM 40 MG: 40 TABLET, DELAYED RELEASE ORAL at 05:24

## 2023-03-04 RX ADMIN — DIPHENHYDRAMINE HYDROCHLORIDE AND LIDOCAINE HYDROCHLORIDE AND ALUMINUM HYDROXIDE AND MAGNESIUM HYDRO 5 ML: KIT at 17:23

## 2023-03-04 RX ADMIN — HYDROMORPHONE HYDROCHLORIDE 0.5 MG: 1 INJECTION, SOLUTION INTRAMUSCULAR; INTRAVENOUS; SUBCUTANEOUS at 08:28

## 2023-03-04 RX ADMIN — Medication 1 MG: at 08:23

## 2023-03-04 RX ADMIN — ENOXAPARIN SODIUM 40 MG: 40 INJECTION SUBCUTANEOUS at 17:23

## 2023-03-04 RX ADMIN — SODIUM CHLORIDE 75 ML/HR: 9 INJECTION, SOLUTION INTRAVENOUS at 20:13

## 2023-03-04 RX ADMIN — DIPHENHYDRAMINE HYDROCHLORIDE AND LIDOCAINE HYDROCHLORIDE AND ALUMINUM HYDROXIDE AND MAGNESIUM HYDRO 5 ML: KIT at 11:32

## 2023-03-04 RX ADMIN — HYDROMORPHONE HYDROCHLORIDE 0.5 MG: 1 INJECTION, SOLUTION INTRAMUSCULAR; INTRAVENOUS; SUBCUTANEOUS at 10:46

## 2023-03-04 RX ADMIN — Medication 100 MG: at 08:23

## 2023-03-04 RX ADMIN — Medication 10 ML: at 08:22

## 2023-03-04 RX ADMIN — Medication 10 ML: at 20:12

## 2023-03-04 RX ADMIN — HYDROMORPHONE HYDROCHLORIDE 0.5 MG: 1 INJECTION, SOLUTION INTRAMUSCULAR; INTRAVENOUS; SUBCUTANEOUS at 02:17

## 2023-03-04 RX ADMIN — HYDROMORPHONE HYDROCHLORIDE 0.5 MG: 1 INJECTION, SOLUTION INTRAMUSCULAR; INTRAVENOUS; SUBCUTANEOUS at 05:25

## 2023-03-04 RX ADMIN — SODIUM CHLORIDE 150 ML/HR: 9 INJECTION, SOLUTION INTRAVENOUS at 11:37

## 2023-03-04 RX ADMIN — MAGNESIUM SULFATE HEPTAHYDRATE 4 G: 40 INJECTION, SOLUTION INTRAVENOUS at 09:05

## 2023-03-04 RX ADMIN — HYDROCODONE BITARTRATE AND ACETAMINOPHEN 1 TABLET: 5; 325 TABLET ORAL at 15:36

## 2023-03-04 RX ADMIN — HYDROMORPHONE HYDROCHLORIDE 0.5 MG: 1 INJECTION, SOLUTION INTRAMUSCULAR; INTRAVENOUS; SUBCUTANEOUS at 13:11

## 2023-03-04 RX ADMIN — Medication 1 TABLET: at 08:23

## 2023-03-04 RX ADMIN — DIPHENHYDRAMINE HYDROCHLORIDE AND LIDOCAINE HYDROCHLORIDE AND ALUMINUM HYDROXIDE AND MAGNESIUM HYDRO 5 ML: KIT at 05:25

## 2023-03-04 NOTE — PLAN OF CARE
"Goal Outcome Evaluation:      Pt has had a non eventful day. C/O pain in his abdomen and received PRN meds. Tx mag. Diet advanced to full diet to advance as tolerated. Pain IV pain meds changed to PO.No s/s of distress noted at this time. Will continue to follow plan of care.    Update:1802     I assessed Pt CIWA scale d/t Pt acting a little different. Pt's behavior is changing from WNL to extra movements. Example is when he stood up from the bed he was bending over \"fiddling\" with the IV tubing coming from the IV pump. When asked what he was doing he didn't have an answer. Also when I handed him his IS he seemed to have a hard time remembering how to do it. It looked as if he was trying to take it apart. He was easily directed but that was a different behavior to our prior interactions with the IS. Pt was oriented, denied itiching, or pins and needles. His CIWA scale right now went from a 1 to a 2. When I left the room the Pt was back to himself. I will pass along to night shift RN. I also made my lead aware.         "

## 2023-03-04 NOTE — PROGRESS NOTES
[facility] HOSPITALIST PROGRESS NOTE    Subjective     History:   Ruperto Maxwell is a 47 y.o. male admitted on 3/3/2023 secondary to Alcohol-induced acute pancreatitis without infection or necrosis     Procedures: None    CC: Follow up alcohol abuse, pancreatitis    Patient seen and examined with TREMAINE Medina. Awake and alert with his wife present at bedside. States he feels better today. No further episodes of nausea or vomiting reported. Abdominal pain appears improved. Reports improvement in withdrawal symptoms. No reported seizure activity since upon admission. No acute events overnight per RN.     History taken from: patient, chart, and RN.      Objective     Vital Signs  Temp:  [97.9 °F (36.6 °C)-98.4 °F (36.9 °C)] 98.4 °F (36.9 °C)  Heart Rate:  [67-92] 92  Resp:  [18-20] 18  BP: (127-141)/(81-99) 141/89  No intake or output data in the 24 hours ending 03/04/23 1638      Physical Exam:  General:    Awake, alert, in no acute distress   Heart:      Normal S1 and S2. Regular rate and rhythm. No significant murmur, rubs or gallops appreciated.   Lungs:     Respirations regular, even and unlabored. Lungs clear to auscultation B/L. No wheezes, rales or rhonchi.   Abdomen:   Soft and nontender. No guarding, rebound tenderness or  organomegaly noted. Bowel sounds present x 4.   Extremities:  No clubbing, cyanosis or edema noted. Moves UE and LE equally B/L.     Results Review:    Results from last 7 days   Lab Units 03/04/23  0344 03/03/23  0610   WBC 10*3/mm3 4.58 12.18*   HEMOGLOBIN g/dL 10.9* 14.0   PLATELETS 10*3/mm3 121* 192     Results from last 7 days   Lab Units 03/04/23  0344 03/03/23  0610   SODIUM mmol/L 141 139   POTASSIUM mmol/L 3.7 3.3*   CHLORIDE mmol/L 107 100   CO2 mmol/L 25.8 23.6   BUN mg/dL 5* 7   CREATININE mg/dL 0.73* 0.96   CALCIUM mg/dL 8.3* 9.3   GLUCOSE mg/dL 88 121*     Results from last 7 days   Lab Units 03/04/23  0344 03/03/23  0610   BILIRUBIN mg/dL 0.9 1.1   ALK PHOS U/L  134* 190*   AST (SGOT) U/L 37 40   ALT (SGPT) U/L 17 22     Results from last 7 days   Lab Units 03/04/23  0344 03/03/23  0610   MAGNESIUM mg/dL 1.8 2.1     Results from last 7 days   Lab Units 03/04/23  0344   INR  1.08     Results from last 7 days   Lab Units 03/03/23  0839 03/03/23  0610   HSTROP T ng/L 8 9       Imaging Results (Last 24 Hours)     ** No results found for the last 24 hours. **            Medications:  colchicine, 0.6 mg, Oral, Daily  enoxaparin, 40 mg, Subcutaneous, Q24H  First Mouthwash (Magic Mouthwash), 5 mL, Swish & Spit, Q6H  thiamine, 100 mg, Oral, Daily   And  multivitamin, 1 tablet, Oral, Daily   And  folic acid, 1 mg, Oral, Daily  pantoprazole, 40 mg, Oral, Q AM  sodium chloride, 10 mL, Intravenous, Q12H      sodium chloride, 75 mL/hr, Last Rate: 75 mL/hr (03/04/23 1539)            Assessment & Plan   Acute pancreatitis: Likely alcohol-induced. No gallstones reported on CT. Triglycerides are normal. Lipase trending downward today. Appears clinically improved. Start CLD diet with orders to advance diet as tolerated. Decrease frequency of IV analgesics. Decrease rate of IVF's. Supportive treatment.     Alcohol abuse with withdrawal: Reported seizures prior to admission with suspected withdrawal seizures. CT head negative for any acute abnormalities. Cont PRN Ativan per CIWA protocol. Cont vitamin supplementation. Monitor and replace electrolytes. Cont safety and seizure precautions. Pt not currently interested in inpatient treatment programs. Cont supportive treatment.    Acute gout: Cont colchicine.     Leukocytosis: Possibly reactive in the setting of above. Resolved today. Monitor off antibiotics. Repeat CBC in the AM.     (+) D-dimer: No evidence of PE on CT chest PE protocol.     Mild hypomagnesemia: Start electrolyte replacement protocol.     DVT PPX: Lovenox     Disposition: Home when medically stable, possibly in the AM.     Reddy Scruggs DO  03/04/23  16:38 EST   Xray Ankle Complete 3 Views, Left

## 2023-03-04 NOTE — PAYOR COMM NOTE
"[facility]  ERLINDA SCHUMACHER  PHONE  [phone]  FAX  [phone]  NPI:  [id number]    REQUEST FOR INPATIENT AUTH    Ignacio Alexander (47 y.o. Male)     Date of Birth   1975    Social Security Number       Address   [address] [facility] [address]    Home Phone   [phone]    MRN   [id number]       Buddhism   [facility]    Marital Status                               Admission Date   3/3/23    Admission Type   Emergency    Admitting Provider   Dimitris May MD    Attending Provider   Reddy Scruggs DO    Department, Room/Bed   [address], 3305/2S       Discharge Date       Discharge Disposition       Discharge Destination                               Attending Provider: Reddy Scruggs DO    Allergies: No Known Allergies    Isolation: None   Infection: None   Code Status: CPR    Ht: 182.9 cm (72\")   Wt: 92 kg (202 lb 12.8 oz)    Admission Cmt: None   Principal Problem: Alcohol-induced acute pancreatitis without infection or necrosis [K85.20]                 Active Insurance as of 3/3/2023     Primary Coverage     Payor Plan Insurance Group Employer/Plan Group    [employer]HE[employer] BLUE CROSS AN[employer]M BLUE CROSS BLUE SHIELD PPO [id number]     Payor Plan Address Payor Plan Phone Number Payor Plan Fax Number Effective Dates    [address] [phone]  6/1/2022 - None Entered    [facility] [id number]       Subscriber Name Subscriber Birth Date Member ID       IGNACIO ALEXANDER 1975 [id number]           Secondary Coverage     Payor Plan Insurance Group Employer/Plan Group    [employer] [place]LL[place]ICAID      Payor Plan Address Payor Plan Phone Number Payor Plan Fax Number Effective Dates    [address] [address] [phone]  3/3/2023 - None Entered    [facility] [id number]       Subscriber Name Subscriber Birth Date Member ID       IGNACIO ALEXANDER 1975 [id number]                 Emergency Contacts      (Rel.) Home Phone Work Phone Mobile Phone "    Tracee Maxwell (Mother) [phone] -- --               History & Physical      Dimitris May MD at 23 0988              [facility]    Patient Identification:  Name:  Ruperto Maxwell  Age:  47 y.o.  Sex:  male  :  1975  MRN:  [id number]   Visit Number:  [id number]  Admit Date: 3/3/2023   Room number:  3305/2S  Primary Care Physician:  Tonya Arellano, SHANNEN    Date of Admission: 3/3/2023     Subjective     Chief complaint:    Chief Complaint   Patient presents with   • Chest Pain   • Syncope   • Headache     History of presenting illness:  47 y.o. male w/ PMH of Etoh Abuse, prior Etoh WD Seizures, Prior DT's, & Gout,   who presented w/ abdominal pain, seizure like episode, and n/v that begun about 3 weeks ago. Pt states he has drank 'a lot' in the past, but has been trying to quit/cut down in the last few weeks due to the N/V. Wife brought him into the ER @ 0548 this am, because last night he had about a 2 minute episode of shaking, associated w/ tongue biting but without bowel/bladder incontinence. Pt states he think he had another episode a night or two previously because he had an injury on the other side of his tongue.     He has been having some abdominal pain that was worse over the last 5 or so days, but reports he has been feeling better for the last two, and that he has recovered his appetite. His most recent meal was yesterday, and states he tolerated it well, and is hungry now.    Wife reports that he had some visual hallucinations last night, and he has had these hallucinations before when he was at [facility] for Alcohol wd (?Last year). She is concerned he may have an adverse reaction to ativan, because she recalls they gave him ativan and then he started having the hallucinations and then became agitated ripping all his IV's out and insisting on going home. Discussed how though we can't be sure, it was likely he was not being given  enough ativan, and given he reports his Alcohol WD symptoms have been controlled previously with otpt valium, and they are both benzos, any visual hallucinations he had around the same time he had Ativan, were most likely due to under treated delirium tremens.     Pt also has complaints of gout pain in his left toe, right knee, and left hip. As well as an acute on chronic headache w/ pressure behind both of his eyes like a sinus headache. CT head was negative, but showed some left maxillary sinus fullness.     Patient states his last drink was yesterday.    History obtained from review of records, check out from the ED provider, Wife at bedside and Patient   ---------------------------------------------------------------------------------------------------------------------   Review of Systems   Constitutional: Negative for activity change, appetite change, fatigue and fever.   HENT: Positive for congestion, sinus pressure and sinus pain.    Eyes: Negative.  Negative for photophobia.   Respiratory: Negative for cough, chest tightness, shortness of breath and wheezing.    Cardiovascular: Negative for chest pain, palpitations and leg swelling.   Gastrointestinal: Positive for abdominal pain, nausea and vomiting. Negative for constipation and diarrhea.   Endocrine: Negative.    Genitourinary: Negative.    Musculoskeletal: Positive for arthralgias and back pain.   Skin: Negative.    Allergic/Immunologic: Negative.    Neurological: Positive for tremors, seizures, syncope and headaches. Negative for facial asymmetry and light-headedness.   Hematological: Negative.    Psychiatric/Behavioral: Positive for hallucinations.     ---------------------------------------------------------------------------------------------------------------------   History reviewed. No pertinent past medical history.  Past Surgical History:   Procedure Laterality Date   • HAND SURGERY       Family History   Problem Relation Age of Onset   • No  Known Problems Mother    • No Known Problems Father      Social History     Socioeconomic History   • Marital status:    Tobacco Use   • Smoking status: Never   • Smokeless tobacco: Current     Types: Snuff   Vaping Use   • Vaping Use: Never used   Substance and Sexual Activity   • Alcohol use: Yes     Comment: 1 pint daily   • Drug use: Never   • Sexual activity: Defer     ---------------------------------------------------------------------------------------------------------------------   Allergies:  Patient has no known allergies.  ---------------------------------------------------------------------------------------------------------------------   Medications below are reported home medications pulling from within the system; at this time, these medications have not been reconciled unless otherwise specified and are in the verification process for further verifcation as current home medications.    Prior to Admission Medications     Prescriptions Last Dose Informant Patient Reported? Taking?    loratadine (CLARITIN) 10 MG tablet Past Month Family Member Yes Yes    Take 1 tablet by mouth Daily As Needed for Allergies.    omeprazole OTC (PriLOSEC OTC) 20 MG EC tablet Past Month Family Member Yes Yes    Take 1 tablet by mouth Daily As Needed (heartburn).    colchicine 0.6 MG tablet Unknown Pharmacy, Family Member Yes No    Take 1 tablet by mouth Daily As Needed (gout flares).          Objective     Vital Signs:  Temp:  [97.7 °F (36.5 °C)-98.4 °F (36.9 °C)] 97.9 °F (36.6 °C)  Heart Rate:  [] 73  Resp:  [16-18] 18  BP: ()/(61-98) 130/89    Mean Arterial Pressure (Non-Invasive) for the past 24 hrs (Last 3 readings):   Noninvasive MAP (mmHg)   03/03/23 1838 102   03/03/23 1703 111   03/03/23 1644 98     SpO2:  [91 %-99 %] 96 %  on   ;   Device (Oxygen Therapy): room air  Body mass index is 27.12 kg/m².    Wt Readings from Last 3 Encounters:   03/03/23 90.7 kg (200 lb)   01/10/22 89.4 kg (197 lb)    12/30/21 89.4 kg (197 lb)      ----------------------------------------------------------------------------------------------------------------------  Physical Exam  Vitals and nursing note reviewed.   Constitutional:       General: He is not in acute distress.     Appearance: Normal appearance. He is not ill-appearing, toxic-appearing or diaphoretic.      Comments: Age appropriate, well appearing middle aged gentleman, with dried vomitus on his sweat shirt   HENT:      Right Ear: External ear normal.      Left Ear: External ear normal.      Nose: Nose normal.      Mouth/Throat:      Mouth: Mucous membranes are moist.   Eyes:      General: No scleral icterus.     Extraocular Movements: Extraocular movements intact.      Conjunctiva/sclera: Conjunctivae normal.      Pupils: Pupils are equal, round, and reactive to light.   Cardiovascular:      Rate and Rhythm: Normal rate and regular rhythm.      Heart sounds: Normal heart sounds.   Pulmonary:      Effort: Pulmonary effort is normal. No respiratory distress.      Breath sounds: Normal breath sounds. No wheezing or rales.   Abdominal:      General: Bowel sounds are normal. There is no distension.      Palpations: Abdomen is soft.      Tenderness: There is no abdominal tenderness. There is no guarding.   Musculoskeletal:      Right lower leg: No edema.      Left lower leg: No edema.   Skin:     General: Skin is warm and dry.      Coloration: Skin is not jaundiced.   Neurological:      General: No focal deficit present.      Mental Status: He is alert and oriented to person, place, and time. Mental status is at baseline.      Cranial Nerves: No cranial nerve deficit.      Motor: No weakness.   Psychiatric:         Mood and Affect: Mood normal.         Behavior: Behavior normal.         Thought Content: Thought content normal.       ---------------------------------------------------------------------------------------------------------------------    Labs:  Results  from last 7 days   Lab Units 03/03/23  0951 03/03/23  0610   LACTATE mmol/L 1.9 3.3*   SED RATE mm/hr  --  16*   CRP mg/dL  --  <0.30   WBC 10*3/mm3  --  12.18*   HEMOGLOBIN g/dL  --  14.0   HEMATOCRIT %  --  41.3   MCV fL  --  100.7*   MCHC g/dL  --  33.9   PLATELETS 10*3/mm3  --  192         Results from last 7 days   Lab Units 03/03/23  0610   SODIUM mmol/L 139   POTASSIUM mmol/L 3.3*   MAGNESIUM mg/dL 2.1   CHLORIDE mmol/L 100   CO2 mmol/L 23.6   BUN mg/dL 7   CREATININE mg/dL 0.96   CALCIUM mg/dL 9.3   GLUCOSE mg/dL 121*   ALBUMIN g/dL 4.1   BILIRUBIN mg/dL 1.1   ALK PHOS U/L 190*   AST (SGOT) U/L 40   ALT (SGPT) U/L 22   Estimated Creatinine Clearance: 119.4 mL/min (by C-G formula based on SCr of 0.96 mg/dL).  No results found for: AMMONIA  Results from last 7 days   Lab Units 03/03/23  0839 03/03/23  0610   HSTROP T ng/L 8 9   PROBNP pg/mL  --  249.0         No results found for: HGBA1C, POCGLU  No results found for: TSH, FREET4  No results found for: PREGTESTUR, PREGSERUM, HCG, HCGQUANT  Pain Management Panel     Pain Management Panel Latest Ref Rng & Units 3/3/2023    AMPHETAMINES SCREEN, URINE Negative Negative    BARBITURATES SCREEN Negative Negative    BENZODIAZEPINE SCREEN, URINE Negative Positive(A)    BUPRENORPHINEUR Negative Negative    COCAINE SCREEN, URINE Negative Negative    METHADONE SCREEN, URINE Negative Negative    METHAMPHETAMINEUR Negative Negative        Brief Urine Lab Results  (Last result in the past 365 days)      Color   Clarity   Blood   Leuk Est   Nitrite   Protein   CREAT   Urine HCG        03/03/23 1038 Yellow   Clear   Negative   Negative   Negative   Negative               No results found for: BLOODCX  No results found for: URINECX  No results found for: WOUNDCX  No results found for: STOOLCX    I have personally looked at the labs and they are summarized  above.    ---------------------------------------------------------------------------------------------------------------------  EKG:   ECG 12 Lead ED Triage Standing Order; Chest Pain   Final Result   Test Reason : ED Triage Standing Order~   Blood Pressure :   */*   mmHG   Vent. Rate : 128 BPM     Atrial Rate : 128 BPM      P-R Int : 156 ms          QRS Dur :  92 ms       QT Int : 298 ms       P-R-T Axes :  22   4  12 degrees      QTc Int : 435 ms      Sinus tachycardia   Otherwise normal ECG   No previous ECGs available   Confirmed by Eduard Wilburn ([id number]) on 3/3/2023 2:51:57 PM      Referred By: TOÑA           Confirmed By: Eduard Wilburn            Last echocardiogram:    --------------------------------------------------------------------------------------------------------------------  Detailed radiology reports for the last 24 hours:    Imaging Results (Last 24 Hours)     Procedure Component Value Units Date/Time    US Abdomen Limited [[id number]] Collected: 03/03/23 1240     Updated: 03/03/23 1242    Narrative:      EXAM:    US Abdomen Limited, Right Upper Quadrant     EXAM DATE:    3/3/2023 11:11 AM     CLINICAL HISTORY:    upper abd pain, vomiting, worse with eating     TECHNIQUE:    Real-time ultrasound of the right upper quadrant with image  documentation.     COMPARISON:    No relevant prior studies available.     FINDINGS:    Liver:  Unremarkable.  No mass.  No intrahepatic bile duct dilation.    Gallbladder:  Unremarkable.  No gallstones.    Common bile duct:  The CBD measures 2.43 mm.  No stones.  No dilation.    Pancreas:  Unremarkable as visualized.    Right kidney: Unremarkable.  No stones.  No solid mass.  No  hydronephrosis.       Impression:        No acute findings in the right upper quadrant.     This report was finalized on 3/3/2023 12:40 PM by Dr. Ancelmo Hughes MD.       CT Angiogram Chest Pulmonary Embolism [[id number]] Collected: 03/03/23 0837     Updated: 03/03/23 0840    Narrative:       EXAM:    CT Angiography Chest With Intravenous Contrast     EXAM DATE:    3/3/2023 7:17 AM     CLINICAL HISTORY:    Pulmonary embolism (PE) suspected, high prob     TECHNIQUE:    Axial computed tomographic angiography images of the chest with  intravenous contrast.  This CT exam was performed using one or more of  the following dose reduction techniques:  automated exposure control,  adjustment of the mA and/or kV according to patient size, and/or use of  iterative reconstruction technique.    MIP reconstructed images were created and reviewed.     COMPARISON:    No relevant prior studies available.     FINDINGS:    Pulmonary arteries:  Unremarkable.  No pulmonary embolism.    Aorta:  No acute findings.  No thoracic aortic aneurysm.    Lungs:  Atelectasis in the lung bases.  No mass.    Pleural space:  Unremarkable.  No significant effusion.  No  pneumothorax.    Heart:  Unremarkable.  No cardiomegaly.  No significant pericardial  effusion.  No evidence of RV dysfunction.    Bones/joints:  No acute fracture.  No dislocation.    Soft tissues:  Unremarkable.    Lymph nodes:  Unremarkable.  No enlarged lymph nodes.       Impression:      1.  No pulmonary embolism.  2.  Atelectasis in the lung bases.     This report was finalized on 3/3/2023 8:38 AM by Dr. Ancelmo Hughes MD.       CT Abdomen Pelvis With Contrast [[id number]] Collected: 03/03/23 0823     Updated: 03/03/23 0827    Narrative:      EXAM:    CT Abdomen and Pelvis With Intravenous Contrast     EXAM DATE:    3/3/2023 7:17 AM     CLINICAL HISTORY:    abd pain     TECHNIQUE:    Axial computed tomography images of the abdomen and pelvis with  intravenous contrast.  Sagittal and coronal reformatted images were  created and reviewed.  This CT exam was performed using one or more of  the following dose reduction techniques:  automated exposure control,  adjustment of the mA and/or kV according to patient size, and/or use of  iterative reconstruction technique.      COMPARISON:    No relevant prior studies available.     FINDINGS:    LUNG BASES:  Unremarkable.  No mass.  No consolidation.      ABDOMEN:    LIVER:  Unremarkable.  No mass.    GALLBLADDER AND BILE DUCTS:  Unremarkable.  No calcified stones.  No  ductal dilation.    PANCREAS:  Unremarkable.  No mass.  No ductal dilation.    SPLEEN:  Unremarkable.  No splenomegaly.    ADRENALS:  Unremarkable.  No mass.    KIDNEYS AND URETERS:  Unremarkable.  No solid mass.  No  hydronephrosis.    STOMACH AND BOWEL:  Unremarkable.  No obstruction.  No mucosal  thickening.      PELVIS:    APPENDIX:  No findings to suggest acute appendicitis.    BLADDER:  Nonspecific urinary bladder wall thickening that could be  related to chronic outlet obstruction.    REPRODUCTIVE:  Unremarkable as visualized.      ABDOMEN and PELVIS:    INTRAPERITONEAL SPACE:  Unremarkable.  No free air.  No significant  fluid collection.    BONES/JOINTS:  No acute fracture.  No dislocation.    SOFT TISSUES:  Unremarkable.    VASCULATURE:  Unremarkable.  No abdominal aortic aneurysm.    LYMPH NODES:  Unremarkable.  No enlarged lymph nodes.       Impression:        Nonspecific urinary bladder wall thickening that could be related to  chronic outlet obstruction.     This report was finalized on 3/3/2023 8:25 AM by Dr. Ancelmo Hughes MD.       XR Chest 2 View [[id number]] Collected: 03/03/23 0508     Updated: 03/03/23 0510    Narrative:      CR Chest 2 Vws    INDICATION:    Chest pain    COMPARISON:    None.    FINDINGS:   PA and lateral views of the chest.  Borderline heart size. Mediastinal contours are normal. The lungs are clear. No pneumothorax or pleural effusion.        Impression:      No acute cardiopulmonary findings.    Signer Name: Obi Dong MD   Signed: 3/3/2023 5:08 AM   Workstation Name: [facility]    [facility]    CT Head Without Contrast [[id number]] Collected: 03/03/23 0506     Updated: 03/03/23 0508    Narrative:       CT Head WO    HISTORY:   Headache. Fall with posterior head trauma. Dizziness.    TECHNIQUE:   Routine noncontrast head CT. Coronal and sagittal reformatted images. Radiation dose reduction techniques included automated exposure control or exposure modulation based on body size. Count of known CT and cardiac nuc med studies performed in previous  12 months: 0.     COMPARISON:   None available.    FINDINGS:   No hemorrhage, acute infarction, mass lesion, or abnormal extra-axial fluid collection. No midline shift or focal mass effect. Ventricular system is normal in size and configuration. No acute osseous abnormality. Mucosal thickening right maxillary sinus.  Visualized mastoid air cells are clear.      Impression:      No acute intracranial abnormality. Right maxillary sinus mucosal thickening.          Signer Name: Obi Dong MD   Signed: 3/3/2023 5:06 AM   Workstation Name: [facility]    [facility]        Final impressions for the last 30 days of radiology reports:    XR Chest 2 View    Result Date: 3/3/2023  No acute cardiopulmonary findings. Signer Name: Obi Dong MD  Signed: 3/3/2023 5:08 AM  Workstation Name: [facility]    CT Head Without Contrast    Result Date: 3/3/2023  No acute intracranial abnormality. Right maxillary sinus mucosal thickening. Signer Name: Obi Dong MD  Signed: 3/3/2023 5:06 AM  Workstation Name: [facility]    CT Abdomen Pelvis With Contrast    Result Date: 3/3/2023    Nonspecific urinary bladder wall thickening that could be related to chronic outlet obstruction.  This report was finalized on 3/3/2023 8:25 AM by Dr. Ancelmo Hughes MD.      CT Angiogram Chest Pulmonary Embolism    Result Date: 3/3/2023  1.  No pulmonary embolism. 2.  Atelectasis in the lung bases.  This report was finalized on 3/3/2023 8:38 AM by Dr. Ancelmo Hughes MD.      US Abdomen Limited    Result  Date: 3/3/2023    No acute findings in the right upper quadrant.  This report was finalized on 3/3/2023 12:40 PM by Dr. Ancelmo Hughes MD.      I have personally read the final radiology reports.      Assessment & Plan       VTE Prophylaxis:   Mechanical Order History:     None      Pharmalogical Order History:      Ordered     Dose Route Frequency Stop    03/03/23 1707  Enoxaparin Sodium (LOVENOX) syringe 40 mg         40 mg SC Every 24 Hours --                #Acute Pancreatitis, likely ETOH  - LFT's not suggestive of gallstone/obstrucitve  - States he has an appetite  - However, will monitor overnight w/ NPO, Pain meds, and fluids  - If lipase trend is down, and still hungry in AM, plan would be for transition to PO pain medication w/ advance as tolerated diet  - Check A1c and lipid panel    #Alcohol WD Seizures  - CIWA, currently with good sensorium will not schedule any medications  - Pt reported to ED that he was not yet interested in completely quitting  - Pt would benefit from substance abuse consuling, wife may benefit from support groups/consuling as well    #Leukocytosis  #Sinus Headache  #Left sinus fullness  - Got zosyn in the ER, no other infectious source found  - Leukocytosis could be from seizure, monitor, but may need treatment for acute sinusitis    #Gout - colchicine daily    Admission Status:   Inpatient Expect > 2 midnights  DVT PPX: Lovenox  Disposition: Home    Dimitris May MD  [facility]  03/03/23  18:49 EST    Electronically signed by Dimitris May MD at 03/03/23 2027          Emergency Department Notes      Jessie Pérez PA at 03/03/23 0644          Subjective   History of Present Illness  Patient is a 47-year-old male with no significant past medical history presenting to the ER complaints of dizziness and not feeling well.  Patient reports that he drinks daily.  Patient states that he has had pancreatitis in the past and feels like he may have it  again due to abdominal pain.  Patient states that he fell twice today and hit his head once.  Patient denies any chest pain, cough, fever, nausea, vomiting or any additional symptoms today.  Patient would not like to detox from alcohol.  Patient drinks approximately 12 beers a day.    History provided by:  Patient   used: No        Review of Systems   Constitutional: Negative.  Negative for fever.   HENT: Negative.    Respiratory: Negative.    Cardiovascular: Negative.  Negative for chest pain.   Gastrointestinal: Positive for abdominal pain.   Endocrine: Negative.    Genitourinary: Negative.  Negative for dysuria.   Skin: Negative.    Neurological: Positive for dizziness.   Psychiatric/Behavioral: Negative.    All other systems reviewed and are negative.      History reviewed. No pertinent past medical history.    No Known Allergies    Past Surgical History:   Procedure Laterality Date   • HAND SURGERY         Family History   Problem Relation Age of Onset   • No Known Problems Mother    • No Known Problems Father        Social History     Socioeconomic History   • Marital status:    Tobacco Use   • Smoking status: Never   • Smokeless tobacco: Current     Types: Snuff   Vaping Use   • Vaping Use: Never used   Substance and Sexual Activity   • Alcohol use: Yes     Comment: occasionally   • Drug use: Never   • Sexual activity: Defer           Objective   Physical Exam  Vitals and nursing note reviewed.   Constitutional:       General: He is not in acute distress.     Appearance: He is well-developed. He is obese. He is not diaphoretic.   HENT:      Head: Normocephalic and atraumatic.      Right Ear: External ear normal.      Left Ear: External ear normal.      Nose: Nose normal.   Eyes:      Conjunctiva/sclera: Conjunctivae normal.      Pupils: Pupils are equal, round, and reactive to light.   Neck:      Vascular: No JVD.      Trachea: No tracheal deviation.   Cardiovascular:      Rate and  Rhythm: Regular rhythm. Tachycardia present.      Heart sounds: Normal heart sounds. No murmur heard.  Pulmonary:      Effort: Pulmonary effort is normal. No respiratory distress.      Breath sounds: Normal breath sounds. No wheezing.   Abdominal:      General: Bowel sounds are normal.      Palpations: Abdomen is soft.      Tenderness: There is no abdominal tenderness.   Musculoskeletal:         General: No deformity. Normal range of motion.      Cervical back: Normal range of motion and neck supple.   Skin:     General: Skin is warm and dry.      Capillary Refill: Capillary refill takes less than 2 seconds.      Coloration: Skin is jaundiced. Skin is not pale.      Findings: No erythema or rash.   Neurological:      Mental Status: He is alert and oriented to person, place, and time.      Cranial Nerves: No cranial nerve deficit.   Psychiatric:         Mood and Affect: Mood normal.         Behavior: Behavior normal.         Thought Content: Thought content normal.         Procedures      US Abdomen Limited   Final Result     No acute findings in the right upper quadrant.       This report was finalized on 3/3/2023 12:40 PM by Dr. Ancelmo Hughes MD.          CT Abdomen Pelvis With Contrast   Final Result     Nonspecific urinary bladder wall thickening that could be related to   chronic outlet obstruction.       This report was finalized on 3/3/2023 8:25 AM by Dr. Ancelmo Hughes MD.          CT Angiogram Chest Pulmonary Embolism   Final Result   1.  No pulmonary embolism.   2.  Atelectasis in the lung bases.       This report was finalized on 3/3/2023 8:38 AM by Dr. Ancelmo Hughes MD.          CT Head Without Contrast   Final Result   No acute intracranial abnormality. Right maxillary sinus mucosal thickening.               Signer Name: Obi Dong MD    Signed: 3/3/2023 5:06 AM    Workstation Name: [id number]     [facility]      XR Chest 2 View   Final Result   No acute cardiopulmonary  findings.      Signer Name: Obi Dong MD    Signed: 3/3/2023 5:08 AM    Workstation Name: [email or web address]     [facility]        Results for orders placed or performed during the hospital encounter of 03/03/23   COVID-19 and FLU A/B PCR - Swab, Nasopharynx    Specimen: Nasopharynx; Swab   Result Value Ref Range    COVID19 Not Detected Not Detected - Ref. Range    Influenza A PCR Not Detected Not Detected    Influenza B PCR Not Detected Not Detected   Comprehensive Metabolic Panel    Specimen: Arm, Right; Blood   Result Value Ref Range    Glucose 121 (H) 65 - 99 mg/dL    BUN 7 6 - 20 mg/dL    Creatinine 0.96 0.76 - 1.27 mg/dL    Sodium 139 136 - 145 mmol/L    Potassium 3.3 (L) 3.5 - 5.2 mmol/L    Chloride 100 98 - 107 mmol/L    CO2 23.6 22.0 - 29.0 mmol/L    Calcium 9.3 8.6 - 10.5 mg/dL    Total Protein 7.9 6.0 - 8.5 g/dL    Albumin 4.1 3.5 - 5.2 g/dL    ALT (SGPT) 22 1 - 41 U/L    AST (SGOT) 40 1 - 40 U/L    Alkaline Phosphatase 190 (H) 39 - 117 U/L    Total Bilirubin 1.1 0.0 - 1.2 mg/dL    Globulin 3.8 gm/dL    A/G Ratio 1.1 g/dL    BUN/Creatinine Ratio 7.3 7.0 - 25.0    Anion Gap 15.4 (H) 5.0 - 15.0 mmol/L    eGFR 96.9 >60.0 mL/min/1.73   High Sensitivity Troponin T    Specimen: Arm, Right; Blood   Result Value Ref Range    HS Troponin T 9 <15 ng/L   CBC Auto Differential    Specimen: Arm, Right; Blood   Result Value Ref Range    WBC 12.18 (H) 3.40 - 10.80 10*3/mm3    RBC 4.10 (L) 4.14 - 5.80 10*6/mm3    Hemoglobin 14.0 13.0 - 17.7 g/dL    Hematocrit 41.3 37.5 - 51.0 %    .7 (H) 79.0 - 97.0 fL    MCH 34.1 (H) 26.6 - 33.0 pg    MCHC 33.9 31.5 - 35.7 g/dL    RDW 15.7 (H) 12.3 - 15.4 %    RDW-SD 57.3 (H) 37.0 - 54.0 fl    MPV 10.1 6.0 - 12.0 fL    Platelets 192 140 - 450 10*3/mm3    Neutrophil % 82.3 (H) 42.7 - 76.0 %    Lymphocyte % 10.3 (L) 19.6 - 45.3 %    Monocyte % 6.6 5.0 - 12.0 %    Eosinophil % 0.1 (L) 0.3 - 6.2 %    Basophil % 0.2 0.0 - 1.5 %    Immature Grans % 0.5 0.0 - 0.5  %    Neutrophils, Absolute 10.04 (H) 1.70 - 7.00 10*3/mm3    Lymphocytes, Absolute 1.25 0.70 - 3.10 10*3/mm3    Monocytes, Absolute 0.80 0.10 - 0.90 10*3/mm3    Eosinophils, Absolute 0.01 0.00 - 0.40 10*3/mm3    Basophils, Absolute 0.02 0.00 - 0.20 10*3/mm3    Immature Grans, Absolute 0.06 (H) 0.00 - 0.05 10*3/mm3    nRBC 0.0 0.0 - 0.2 /100 WBC   BNP    Specimen: Arm, Right; Blood   Result Value Ref Range    proBNP 249.0 0.0 - 450.0 pg/mL   D-dimer, Quantitative    Specimen: Arm, Right; Blood   Result Value Ref Range    D-Dimer, Quantitative 0.55 (H) 0.00 - 0.50 MCGFEU/mL   C-reactive Protein    Specimen: Arm, Right; Blood   Result Value Ref Range    C-Reactive Protein <0.30 0.00 - 0.50 mg/dL   Sedimentation Rate    Specimen: Arm, Right; Blood   Result Value Ref Range    Sed Rate 16 (H) 0 - 15 mm/hr   Lactic Acid, Plasma    Specimen: Arm, Right; Blood   Result Value Ref Range    Lactate 3.3 (C) 0.5 - 2.0 mmol/L   Magnesium    Specimen: Arm, Right; Blood   Result Value Ref Range    Magnesium 2.1 1.6 - 2.6 mg/dL   Lipase    Specimen: Arm, Right; Blood   Result Value Ref Range    Lipase 197 (H) 13 - 60 U/L   Ethanol    Specimen: Arm, Right; Blood   Result Value Ref Range    Ethanol <10 0 - 10 mg/dL    Ethanol % <0.010 %   Hepatitis Panel, Acute    Specimen: Arm, Right; Blood   Result Value Ref Range    Hepatitis B Surface Ag Non-Reactive Non-Reactive    Hep A IgM Non-Reactive Non-Reactive    Hep B C IgM Non-Reactive Non-Reactive    Hepatitis C Ab Non-Reactive Non-Reactive   STAT Lactic Acid, Reflex    Specimen: Hand, Right; Blood   Result Value Ref Range    Lactate 1.9 0.5 - 2.0 mmol/L   High Sensitivity Troponin T 2Hr    Specimen: Arm, Right; Blood   Result Value Ref Range    HS Troponin T 8 <15 ng/L    Troponin T Delta -1 >=-4 - <+4 ng/L   Urinalysis With Microscopic If Indicated (No Culture) - Urine, Clean Catch    Specimen: Urine, Clean Catch   Result Value Ref Range    Color, UA Yellow Yellow, Straw    Appearance,  UA Clear Clear    pH, UA 6.0 5.0 - 8.0    Specific Gravity, UA >1.030 (H) 1.005 - 1.030    Glucose, UA Negative Negative    Ketones, UA Negative Negative    Bilirubin, UA Negative Negative    Blood, UA Negative Negative    Protein, UA Negative Negative    Leuk Esterase, UA Negative Negative    Nitrite, UA Negative Negative    Urobilinogen, UA 0.2 E.U./dL 0.2 - 1.0 E.U./dL   Urine Drug Screen - Urine, Clean Catch    Specimen: Urine, Clean Catch   Result Value Ref Range    THC, Screen, Urine Negative Negative    Phencyclidine (PCP), Urine Negative Negative    Cocaine Screen, Urine Negative Negative    Methamphetamine, Ur Negative Negative    Opiate Screen Positive (A) Negative    Amphetamine Screen, Urine Negative Negative    Benzodiazepine Screen, Urine Positive (A) Negative    Tricyclic Antidepressants Screen Negative Negative    Methadone Screen, Urine Negative Negative    Barbiturates Screen, Urine Negative Negative    Oxycodone Screen, Urine Negative Negative    Propoxyphene Screen Negative Negative    Buprenorphine, Screen, Urine Negative Negative   Amylase    Specimen: Arm, Right; Blood   Result Value Ref Range    Amylase 83 28 - 100 U/L   ECG 12 Lead ED Triage Standing Order; Chest Pain   Result Value Ref Range    QT Interval 298 ms    QTC Interval 435 ms   Green Top (Gel)   Result Value Ref Range    Extra Tube Hold for add-ons.    Lavender Top   Result Value Ref Range    Extra Tube hold for add-on    Gold Top - SST   Result Value Ref Range    Extra Tube Hold for add-ons.    Light Blue Top   Result Value Ref Range    Extra Tube Hold for add-ons.        ED Course  ED Course as of 03/03/23 1551   Fri Mar 03, 2023   0555 XR Chest 2 View [SM]   0555 CT Head Without Contrast [SM]   0638 EKG shows sinus tachycardia, rate 128.  RI interval 156, QRS duration 92, QTc 435 ms.  No apparent acute ischemia.  No evidence for STEMI. [CM]   0753 Endorsed to NEFTALY Riley at shift change  [SM]   0828 CT Abdomen Pelvis With  Contrast  FINDINGS:    LUNG BASES:  Unremarkable.  No mass.  No consolidation.      ABDOMEN:    LIVER:  Unremarkable.  No mass.    GALLBLADDER AND BILE DUCTS:  Unremarkable.  No calcified stones.  No  ductal dilation.    PANCREAS:  Unremarkable.  No mass.  No ductal dilation.    SPLEEN:  Unremarkable.  No splenomegaly.    ADRENALS:  Unremarkable.  No mass.    KIDNEYS AND URETERS:  Unremarkable.  No solid mass.  No  hydronephrosis.    STOMACH AND BOWEL:  Unremarkable.  No obstruction.  No mucosal  thickening.      PELVIS:    APPENDIX:  No findings to suggest acute appendicitis.    BLADDER:  Nonspecific urinary bladder wall thickening that could be  related to chronic outlet obstruction.    REPRODUCTIVE:  Unremarkable as visualized.      ABDOMEN and PELVIS:    INTRAPERITONEAL SPACE:  Unremarkable.  No free air.  No significant  fluid collection.    BONES/JOINTS:  No acute fracture.  No dislocation.    SOFT TISSUES:  Unremarkable.    VASCULATURE:  Unremarkable.  No abdominal aortic aneurysm.    LYMPH NODES:  Unremarkable.  No enlarged lymph nodes.     IMPRESSION:    Nonspecific urinary bladder wall thickening that could be related to  chronic outlet obstruction. [AH]   0843 CT Angiogram Chest Pulmonary Embolism  FINDINGS:    Pulmonary arteries:  Unremarkable.  No pulmonary embolism.    Aorta:  No acute findings.  No thoracic aortic aneurysm.    Lungs:  Atelectasis in the lung bases.  No mass.    Pleural space:  Unremarkable.  No significant effusion.  No  pneumothorax.    Heart:  Unremarkable.  No cardiomegaly.  No significant pericardial  effusion.  No evidence of RV dysfunction.    Bones/joints:  No acute fracture.  No dislocation.    Soft tissues:  Unremarkable.    Lymph nodes:  Unremarkable.  No enlarged lymph nodes.     IMPRESSION:  1.  No pulmonary embolism.  2.  Atelectasis in the lung bases. [AH]   1044 Dr. Christine to evaluate the patient [AH]   1256 US Abdomen Limited  FINDINGS:    Liver:  Unremarkable.  No mass.   No intrahepatic bile duct dilation.    Gallbladder:  Unremarkable.  No gallstones.    Common bile duct:  The CBD measures 2.43 mm.  No stones.  No dilation.    Pancreas:  Unremarkable as visualized.    Right kidney: Unremarkable.  No stones.  No solid mass.  No  hydronephrosis.     IMPRESSION:    No acute findings in the right upper quadrant. [AH]   6727 I assumed care of this patient at 8 AM today.  He has been personally seen and evaluated by myself and Dr. Christine.  The patient reports a 3-week history of nausea and vomiting with upper abdominal pain.  He states the pain is worse when he tries to eat.  He does have a history of alcohol use.  He states he drinks a pint of vodka, 12 beers or a bottle of wine daily.  He states he had stopped drinking for about 4 or 5 days due to his vomiting and abdominal pain but he was feeling better yesterday so he started drinking again.  He states he had about a pint yesterday evening which he reports is less than he typically drinks.  His wife is present with him and states last night around 11 PM he had an episode where he fell into the floor and she believes that he may have had a seizure.  She states he was shaking all over and foaming at the mouth.  She states about 2 hours later he had another similar episode.  The patient reports he does have a past history of alcohol withdrawal seizures.  He denies any withdrawal symptoms at this time.  He is awake and alert, no acute distress.  He is complaining of a headache and also shows me 2 ulcerations on each side of his tongue.  He states that started about 4 days ago after he had a fever. []   1541 Dr. May to admit []      ED Course User Index  [AH] Jessie Pérez PA  [CM] Mikey Lang MD  [SM] Mercedes Redding, SHANNEN                                           Medical Decision Making  Patient is a 47-year-old male with no significant past medical history presenting to the ER complaints of dizziness and not feeling  well.  Patient reports that he drinks daily.  Patient states that he has had pancreatitis in the past and feels like he may have it again due to abdominal pain.  Patient states that he fell twice today and hit his head once.  Patient denies any chest pain, cough, fever, nausea, vomiting or any additional symptoms today.  Patient would not like to detox from alcohol.  Patient drinks approximately 12 beers a day.  I assumed care of the patient at 8 AM.  Patient received IV fluids and antibiotics.  He tolerated these well.  He received medication for a headache.  He has not had any seizure activity in the ED.  He has not had any alcohol withdrawal symptoms in the ED.  Dr. Christine saw and evaluated this patient as well.  He spoke with the hospitalist service who agrees on admission.    Alcohol-induced acute pancreatitis without infection or necrosis: acute illness or injury  Amount and/or Complexity of Data Reviewed  Labs: ordered.  Radiology: ordered. Decision-making details documented in ED Course.  ECG/medicine tests: ordered.  Discussion of management or test interpretation with external provider(s): Dr. May - hospitalist service    Risk  OTC drugs.  Prescription drug management.  Decision regarding hospitalization.          Final diagnoses:   Alcohol-induced acute pancreatitis without infection or necrosis       ED Disposition  ED Disposition     ED Disposition   Decision to Admit    Condition   --    Comment   Level of Care: Telemetry [5]   Diagnosis: Alcohol-induced acute pancreatitis without infection or necrosis [[id number]]   Admitting Physician: URSULA MAY [[id number]]   Certification: I Certify That Inpatient Hospital Services Are Medically Necessary For Greater Than 2 Midnights               No follow-up provider specified.       Medication List      No changes were made to your prescriptions during this visit.          Jessie Pérez PA  03/03/23 9351      Electronically signed by Jessie Pérez PA at  03/03/23 1551     Reddy Villalba RN at 03/03/23 0703        Report given to Preston PENALOZA    Electronically signed by Reddy Villalba RN at 03/03/23 0703         Current Facility-Administered Medications   Medication Dose Route Frequency Provider Last Rate Last Admin   • colchicine tablet 0.6 mg  0.6 mg Oral Daily Dimitris May MD   0.6 mg at 03/03/23 2040   • Enoxaparin Sodium (LOVENOX) syringe 40 mg  40 mg Subcutaneous Q24H Dimitris May MD   40 mg at 03/03/23 1823   • First Mouthwash (Magic Mouthwash) 5 mL  5 mL Swish & Spit Q6H Dimitris May MD   5 mL at 03/04/23 0525   • thiamine (VITAMIN B-1) tablet 100 mg  100 mg Oral Daily Dimitris May MD        And   • multivitamin (THERAGRAN) tablet 1 tablet  1 tablet Oral Daily Dimitris May MD        And   • folic acid (FOLVITE) tablet 1 mg  1 mg Oral Daily Dimitris May MD       • HYDROmorphone (DILAUDID) injection 0.5 mg  0.5 mg Intravenous Q2H PRN Dimitris May MD   0.5 mg at 03/04/23 0525    And   • naloxone (NARCAN) injection 0.4 mg  0.4 mg Intravenous Q5 Min PRN Dimitris May MD       • labetalol (NORMODYNE,TRANDATE) injection 10 mg  10 mg Intravenous Q4H PRN Dimitris May MD       • lactated ringers infusion  125 mL/hr Intravenous Continuous Dimitris May  mL/hr at 03/03/23 1444 125 mL/hr at 03/03/23 1444   • LORazepam (ATIVAN) tablet 1 mg  1 mg Oral Q2H PRN Dimitris May MD        Or   • LORazepam (ATIVAN) injection 1 mg  1 mg Intravenous Q2H PRN Dimitris May MD        Or   • LORazepam (ATIVAN) tablet 2 mg  2 mg Oral Q1H PRN Dimitris May MD        Or   • LORazepam (ATIVAN) injection 2 mg  2 mg Intravenous Q1H PRN Dimitris May MD        Or   • LORazepam (ATIVAN) injection 2 mg  2 mg Intravenous Q15 Min PRN Dimitris May MD        Or   • LORazepam (ATIVAN) injection 2 mg  2 mg Intramuscular Q15 Min PRN Dimitris May MD        • nitroglycerin (NITROSTAT) SL tablet 0.4 mg  0.4 mg Sublingual Q5 Min PRN Dimitris May MD       • ondansetron (ZOFRAN) injection 4 mg  4 mg Intravenous Q6H PRN Dimitris May MD       • pantoprazole (PROTONIX) EC tablet 40 mg  40 mg Oral Q AM Dimitris May MD   40 mg at 03/04/23 0524   • sodium chloride 0.9 % flush 1-10 mL  1-10 mL Intravenous PRN Dimitris May MD       • sodium chloride 0.9 % flush 10 mL  10 mL Intravenous PRN Dimitris May MD       • sodium chloride 0.9 % flush 10 mL  10 mL Intravenous Q12H Dimitris May MD   10 mL at 03/03/23 2041   • sodium chloride 0.9 % infusion 40 mL  40 mL Intravenous PRN Dimitris May MD       • sodium chloride 0.9 % infusion  150 mL/hr Intravenous Continuous Dimitris aMy  mL/hr at 03/04/23 0217 150 mL/hr at 03/04/23 0217     Orders (last 24 hrs)      Start     Ordered    03/04/23 0900  thiamine (VITAMIN B-1) tablet 100 mg  Daily        See Hyperspace for full Linked Orders Report.    03/03/23 1707    03/04/23 0900  multivitamin (THERAGRAN) tablet 1 tablet  Daily        See Hyperspace for full Linked Orders Report.    03/03/23 1707    03/04/23 0900  folic acid (FOLVITE) tablet 1 mg  Daily        See Hyperspace for full Linked Orders Report.    03/03/23 1707    03/04/23 0600  CBC Auto Differential  Morning Draw         03/03/23 1707    03/04/23 0600  Magnesium  Morning Draw         03/03/23 1707    03/04/23 0600  Phosphorus  Morning Draw         03/03/23 1707    03/04/23 0600  Procalcitonin  Morning Draw         03/03/23 1707    03/04/23 0600  TSH  Morning Draw         03/03/23 1707    03/04/23 0600  Comprehensive Metabolic Panel  Morning Draw         03/03/23 1707    03/04/23 0600  Amylase  Morning Draw         03/03/23 1707    03/04/23 0600  Lipase  Morning Draw         03/03/23 1707    03/04/23 0600  Lipid Panel  Morning Draw         03/03/23 1707    03/04/23 0600  Protime-INR  Morning Draw          03/03/23 1707    03/04/23 0600  T4, Free  Morning Draw         03/03/23 1707    03/04/23 0600  Hemoglobin A1c  Morning Draw         03/03/23 1707    03/04/23 0600  pantoprazole (PROTONIX) EC tablet 40 mg  Every Early Morning         03/03/23 1850    03/04/23 0352  Scan Slide  Once,   Status:  Canceled         03/04/23 0351    03/03/23 2100  sodium chloride 0.9 % flush 10 mL  Every 12 Hours Scheduled         03/03/23 1707    03/03/23 2000  Vital Signs  Every 4 Hours       03/03/23 1707    03/03/23 2000  colchicine tablet 0.6 mg  Daily         03/03/23 1851 03/03/23 1854  Lactic Acid, Plasma  Once         03/03/23 1853    03/03/23 1851  NPO Diet NPO Type: Strict NPO, Sips with Meds  Diet Effective Now         03/03/23 1851    03/03/23 1800  Oral Care  2 Times Daily       03/03/23 1707    03/03/23 1800  Incentive Spirometry  Every 4 Hours While Awake       03/03/23 1707    03/03/23 1800  Enoxaparin Sodium (LOVENOX) syringe 40 mg  Every 24 Hours         03/03/23 1707    03/03/23 1800  sodium chloride 0.9 % infusion  Continuous         03/03/23 1707    03/03/23 1739  seizure pads  Once         03/03/23 1739    03/03/23 1708  Notify Physician (Specify Parameters)  Until Discontinued         03/03/23 1707    03/03/23 1708  Intake & Output  Every Shift       03/03/23 1707    03/03/23 1708  Weigh Patient  Once         03/03/23 1707    03/03/23 1708  Daily Weights  Daily       03/03/23 1707    03/03/23 1708  Insert Peripheral IV  Once         03/03/23 1707    03/03/23 1708  Saline Lock & Maintain IV Access  Continuous,   Status:  Canceled         03/03/23 1707    03/03/23 1708  Telemetry - Maintain IV Access  Continuous         03/03/23 1707    03/03/23 1708  Continuous Cardiac Monitoring  Continuous        Comments: Follow Standing Orders As Outlined in Process Instructions (Open Order Report to View Full Instructions)    03/03/23 1707    03/03/23 1708  May Be Off Telemetry for Tests  Continuous         03/03/23 5848     03/03/23 1708  Pulse Oximetry, Continuous  Continuous,   Status:  Canceled         03/03/23 1707    03/03/23 1708  NPO Diet NPO Type: Strict NPO  Diet Effective Now,   Status:  Canceled         03/03/23 1707    03/03/23 1708  Initiate Alcohol Withdrawal Protocol  Once         03/03/23 1707    03/03/23 1708  Vital Signs  Per Hospital Policy         03/03/23 1707    03/03/23 1708  Continuous Pulse Oximetry  Continuous         03/03/23 1707    03/03/23 1708  Obtain Baseline Clinical [place] Withdrawal Assessment - Ar (CIWA-Ar), Sedation Scale & Vital Signs  Once        Comments: Follow CIWA Scoring Reference Guide    03/03/23 1707    03/03/23 1708  Clinical [place] Withdrawal Assessment (CIWA-Ar)  Per Hospital Policy         03/03/23 1707    03/03/23 1708  If CIWA-Ar Score Less Than 8 For 3 Consecutive Assessments, Monitor Every 4 Hours & Discontinue Assessment When CIWA-Ar Less Than 8 for 24 Hours  Per Hospital Policy        Comments: Contact Provider to Restart Protocol if Any Symptoms Reappear    03/03/23 1707    03/03/23 1708  Seizure Precautions  Continuous         03/03/23 1707    03/03/23 1708  Notify Provider - Withdrawal  Until Discontinued         03/03/23 1707    03/03/23 1708  Notify Provider of Abnormal Lab Results  Until Discontinued         03/03/23 1707    03/03/23 1708  Notify Provider - Vitals  Until Discontinued         03/03/23 1707    03/03/23 1708  Safety Precautions  Continuous         03/03/23 1707    03/03/23 1707  HYDROmorphone (DILAUDID) injection 0.5 mg  Every 2 Hours PRN        See Hyperspace for full Linked Orders Report.    03/03/23 1707    03/03/23 1707  naloxone (NARCAN) injection 0.4 mg  Every 5 Minutes PRN        See Hyperspace for full Linked Orders Report.    03/03/23 1707    03/03/23 1707  ondansetron (ZOFRAN) injection 4 mg  Every 6 Hours PRN         03/03/23 1707    03/03/23 1707  LORazepam (ATIVAN) tablet 1 mg  Every 2 Hours PRN        See Hyperspace for full Linked  Orders Report.    03/03/23 1707    03/03/23 1707  LORazepam (ATIVAN) injection 1 mg  Every 2 Hours PRN        See Hyperspace for full Linked Orders Report.    03/03/23 1707    03/03/23 1707  LORazepam (ATIVAN) tablet 2 mg  Every 1 Hour PRN        See Hyperspace for full Linked Orders Report.    03/03/23 1707    03/03/23 1707  LORazepam (ATIVAN) injection 2 mg  Every 1 Hour PRN        See Hyperspace for full Linked Orders Report.    03/03/23 1707    03/03/23 1707  LORazepam (ATIVAN) injection 2 mg  Every 15 Minutes PRN        See Hyperspace for full Linked Orders Report.    03/03/23 1707    03/03/23 1707  LORazepam (ATIVAN) injection 2 mg  Every 15 Minutes PRN        See Hyperspace for full Linked Orders Report.    03/03/23 1707    03/03/23 1707  labetalol (NORMODYNE,TRANDATE) injection 10 mg  Every 4 Hours PRN         03/03/23 1707    03/03/23 1707  thiamine (B-1) injection 100 mg  Once        See Hyperspace for full Linked Orders Report.    03/03/23 1707    03/03/23 1707  thiamine (VITAMIN B-1) tablet 100 mg  Once        See Hyperspace for full Linked Orders Report.    03/03/23 1707    03/03/23 1707  sodium chloride 0.9 % flush 1-10 mL  As Needed         03/03/23 1707    03/03/23 1707  sodium chloride 0.9 % infusion 40 mL  As Needed         03/03/23 1707    03/03/23 1707  Telemetry - Pulse Oximetry  Continuous PRN,   Status:  Canceled      Comments: If Patient Develops Unresponsiveness, Acute Dyspnea, Cyanosis or Suspected Hypoxemia Start Continuous Pulse Ox Monitoring, Apply Oxygen & Notify Provider    03/03/23 1707    03/03/23 1707  nitroglycerin (NITROSTAT) SL tablet 0.4 mg  Every 5 Minutes PRN         03/03/23 1707    03/03/23 1601  Code Status and Medical Interventions:  Continuous         03/03/23 1607    03/03/23 1546  Inpatient Admission  Once         03/03/23 1545    03/03/23 1543  Hospitalist (on-call MD unless specified)  Once        Specialty:  Hospitalist  Provider:  Dimitris May MD     03/03/23 1542    03/03/23 1500  First Mouthwash (Magic Mouthwash) 5 mL  Every 6 Hours Scheduled         03/03/23 1424    03/03/23 1431  potassium chloride (K-DUR,KLOR-CON) CR tablet 40 mEq  Once         03/03/23 1429    03/03/23 1427  ketorolac (TORADOL) injection 30 mg  Once         03/03/23 1425    03/03/23 1425  lactated ringers infusion  Continuous         03/03/23 1423    03/03/23 1108  thiamine (B-1) 100 mg in sodium chloride 0.9 % 100 mL IVPB  Once         03/03/23 1106    03/03/23 1107  ondansetron (ZOFRAN) injection 4 mg  Once         03/03/23 1105    03/03/23 1107  morphine injection 2 mg  Once         03/03/23 1105    03/03/23 1106  US Abdomen Limited  1 Time Imaging         03/03/23 1105    03/03/23 1043  Amylase  STAT         03/03/23 1043    03/03/23 0910  STAT Lactic Acid, Reflex  PROCEDURE ONCE         03/03/23 0727    03/03/23 0829  Urinalysis With Microscopic If Indicated (No Culture) - Urine, Clean Catch  Once         03/03/23 0828    03/03/23 0829  Urine Drug Screen - Urine, Clean Catch  Once         03/03/23 0828    03/03/23 0820  iopamidol (ISOVUE-370) 76 % injection 100 mL  Once in Imaging         03/03/23 0818    03/03/23 0819  iopamidol (ISOVUE-370) 76 % injection 100 mL  Once in Imaging         03/03/23 0817    03/03/23 0810  High Sensitivity Troponin T 2Hr  PROCEDURE ONCE         03/03/23 0748    03/03/23 0739  lactated ringers bolus 2,721 mL  Once         03/03/23 0737    03/03/23 0739  piperacillin-tazobactam (ZOSYN) IVPB 3.375 g in 100 mL NS VTB  Once         03/03/23 0737    03/03/23 0739  vancomycin 1750 mg/500 mL 0.9% NS IVPB ([facility])  Once         03/03/23 0737    03/03/23 0702  potassium chloride (K-DUR,KLOR-CON) CR tablet 40 mEq  Once         03/03/23 0700    03/03/23 0419  sodium chloride 0.9 % flush 10 mL  As Needed         03/03/23 0419    Unscheduled  Oxygen Therapy- Nasal Cannula; 2 LPM; Titrate for SPO2: equal to or greater than, 92%  Continuous PRN       03/03/23 0411     Unscheduled  ECG 12 Lead ED Triage Standing Order; Chest Pain  As Needed      Comments: Persistent, Unrelieved or Recurrent Chest Pain    03/03/23 0419    Unscheduled  Oxygen Therapy- Nasal Cannula; Titrate for SPO2: 90% - 95%  Continuous PRN      Comments: If Patient Develops Unresponsiveness, Acute Dyspnea, Cyanosis or Suspected Hypoxemia Start Continuous Pulse Ox Monitoring, Apply Oxygen & Notify Provider    03/03/23 1707    Unscheduled  ECG 12 Lead Chest Pain  As Needed      Comments: Nurse to Release if Patient Expericences Acute Chest Pain or Dysrhythmias    03/03/23 1707    Unscheduled  Potassium  As Needed      Comments: For Ventricular Arrhythmias      03/03/23 1707    Unscheduled  Magnesium  As Needed      Comments: For Ventricular Arrhythmias      03/03/23 1707    Unscheduled  High Sensitivity Troponin T  As Needed      Comments: For Chest Pain      03/03/23 1707    Unscheduled  Blood Gas, Arterial -With Co-Ox Panel: Yes  As Needed      Comments: Draw for Acute Dyspnea, Cyanosis, Suspected Hypoxemia or UnresponsivenessNotify Provider of Results      03/03/23 1707    Unscheduled  Up With Assistance  As Needed       03/03/23 1707    Unscheduled  Obtain Pre & Post Sedation Scores With Every Lorazepam Dose - Hold For POSS Greater Than 2 or RASS of -3 or Less  As Needed       03/03/23 1707    --  omeprazole OTC (PriLOSEC OTC) 20 MG EC tablet  Daily PRN         03/03/23 1821    --  loratadine (CLARITIN) 10 MG tablet  Daily PRN         03/03/23 1821    --  colchicine 0.6 MG tablet  Daily PRN         03/03/23 1821    --  SCANNED - TELEMETRY           03/03/23 0000                Physician Progress Notes (last 24 hours)  Notes from 03/03/23 0709 through 03/04/23 0709   No notes of this type exist for this encounter.         Consult Notes (last 24 hours)  Notes from 03/03/23 0709 through 03/04/23 0709   No notes of this type exist for this encounter.

## 2023-03-04 NOTE — PLAN OF CARE
Goal Outcome Evaluation:   Patient resting in bed. IVF running per order. Reports periodic abdominal pain. PRN pain medicines effective. No other reports of any acute distress. Family to bed side. Bed alarm on. HOB elevated, and call bell in reach. Will continue to follow the POC.

## 2023-03-05 VITALS
SYSTOLIC BLOOD PRESSURE: 158 MMHG | WEIGHT: 215.3 LBS | OXYGEN SATURATION: 98 % | HEIGHT: 72 IN | TEMPERATURE: 98.1 F | RESPIRATION RATE: 18 BRPM | DIASTOLIC BLOOD PRESSURE: 97 MMHG | HEART RATE: 68 BPM | BODY MASS INDEX: 29.16 KG/M2

## 2023-03-05 LAB
ALBUMIN SERPL-MCNC: 3 G/DL (ref 3.5–5.2)
ALBUMIN/GLOB SERPL: 1.2 G/DL
ALP SERPL-CCNC: 128 U/L (ref 39–117)
ALT SERPL W P-5'-P-CCNC: 20 U/L (ref 1–41)
ANION GAP SERPL CALCULATED.3IONS-SCNC: 7.3 MMOL/L (ref 5–15)
AST SERPL-CCNC: 37 U/L (ref 1–40)
BASOPHILS # BLD AUTO: 0.04 10*3/MM3 (ref 0–0.2)
BASOPHILS NFR BLD AUTO: 1.2 % (ref 0–1.5)
BILIRUB SERPL-MCNC: 0.6 MG/DL (ref 0–1.2)
BUN SERPL-MCNC: 3 MG/DL (ref 6–20)
BUN/CREAT SERPL: 4.5 (ref 7–25)
CALCIUM SPEC-SCNC: 7.9 MG/DL (ref 8.6–10.5)
CHLORIDE SERPL-SCNC: 107 MMOL/L (ref 98–107)
CO2 SERPL-SCNC: 25.7 MMOL/L (ref 22–29)
CREAT SERPL-MCNC: 0.66 MG/DL (ref 0.76–1.27)
DEPRECATED RDW RBC AUTO: 60.1 FL (ref 37–54)
EGFRCR SERPLBLD CKD-EPI 2021: 115 ML/MIN/1.73
EOSINOPHIL # BLD AUTO: 0.18 10*3/MM3 (ref 0–0.4)
EOSINOPHIL NFR BLD AUTO: 5.4 % (ref 0.3–6.2)
ERYTHROCYTE [DISTWIDTH] IN BLOOD BY AUTOMATED COUNT: 15.9 % (ref 12.3–15.4)
GLOBULIN UR ELPH-MCNC: 2.6 GM/DL
GLUCOSE SERPL-MCNC: 107 MG/DL (ref 65–99)
HCT VFR BLD AUTO: 32.3 % (ref 37.5–51)
HGB BLD-MCNC: 10.6 G/DL (ref 13–17.7)
IMM GRANULOCYTES # BLD AUTO: 0 10*3/MM3 (ref 0–0.05)
IMM GRANULOCYTES NFR BLD AUTO: 0 % (ref 0–0.5)
LIPASE SERPL-CCNC: 138 U/L (ref 13–60)
LYMPHOCYTES # BLD AUTO: 1.2 10*3/MM3 (ref 0.7–3.1)
LYMPHOCYTES NFR BLD AUTO: 36 % (ref 19.6–45.3)
MAGNESIUM SERPL-MCNC: 2 MG/DL (ref 1.6–2.6)
MCH RBC QN AUTO: 33.5 PG (ref 26.6–33)
MCHC RBC AUTO-ENTMCNC: 32.8 G/DL (ref 31.5–35.7)
MCV RBC AUTO: 102.2 FL (ref 79–97)
MONOCYTES # BLD AUTO: 0.36 10*3/MM3 (ref 0.1–0.9)
MONOCYTES NFR BLD AUTO: 10.8 % (ref 5–12)
NEUTROPHILS NFR BLD AUTO: 1.55 10*3/MM3 (ref 1.7–7)
NEUTROPHILS NFR BLD AUTO: 46.6 % (ref 42.7–76)
NRBC BLD AUTO-RTO: 0 /100 WBC (ref 0–0.2)
PHOSPHATE SERPL-MCNC: 3.7 MG/DL (ref 2.5–4.5)
PLATELET # BLD AUTO: 133 10*3/MM3 (ref 140–450)
PMV BLD AUTO: 10.6 FL (ref 6–12)
POTASSIUM SERPL-SCNC: 3.7 MMOL/L (ref 3.5–5.2)
PROT SERPL-MCNC: 5.6 G/DL (ref 6–8.5)
RBC # BLD AUTO: 3.16 10*6/MM3 (ref 4.14–5.8)
SODIUM SERPL-SCNC: 140 MMOL/L (ref 136–145)
WBC NRBC COR # BLD: 3.33 10*3/MM3 (ref 3.4–10.8)

## 2023-03-05 PROCEDURE — 85025 COMPLETE CBC W/AUTO DIFF WBC: CPT | Performed by: INTERNAL MEDICINE

## 2023-03-05 PROCEDURE — 80053 COMPREHEN METABOLIC PANEL: CPT | Performed by: INTERNAL MEDICINE

## 2023-03-05 PROCEDURE — 84100 ASSAY OF PHOSPHORUS: CPT | Performed by: INTERNAL MEDICINE

## 2023-03-05 PROCEDURE — 83690 ASSAY OF LIPASE: CPT | Performed by: INTERNAL MEDICINE

## 2023-03-05 PROCEDURE — 99239 HOSP IP/OBS DSCHRG MGMT >30: CPT | Performed by: INTERNAL MEDICINE

## 2023-03-05 PROCEDURE — 83735 ASSAY OF MAGNESIUM: CPT | Performed by: INTERNAL MEDICINE

## 2023-03-05 PROCEDURE — G0378 HOSPITAL OBSERVATION PER HR: HCPCS

## 2023-03-05 RX ORDER — LIDOCAINE HYDROCHLORIDE 20 MG/ML
10 SOLUTION OROPHARYNGEAL
Qty: 100 ML | Refills: 0 | Status: SHIPPED | OUTPATIENT
Start: 2023-03-05

## 2023-03-05 RX ORDER — DIPHENHYDRAMINE HYDROCHLORIDE AND LIDOCAINE HYDROCHLORIDE AND ALUMINUM HYDROXIDE AND MAGNESIUM HYDRO
5 KIT EVERY 6 HOURS SCHEDULED
Qty: 100 ML | Refills: 0 | Status: SHIPPED | OUTPATIENT
Start: 2023-03-05 | End: 2023-03-10

## 2023-03-05 RX ORDER — DIPHENOXYLATE HYDROCHLORIDE AND ATROPINE SULFATE 2.5; .025 MG/1; MG/1
1 TABLET ORAL DAILY
Qty: 30 TABLET | Refills: 0 | Status: SHIPPED | OUTPATIENT
Start: 2023-03-06

## 2023-03-05 RX ADMIN — COLCHICINE 0.6 MG: 0.6 TABLET ORAL at 08:15

## 2023-03-05 RX ADMIN — DIPHENHYDRAMINE HYDROCHLORIDE AND LIDOCAINE HYDROCHLORIDE AND ALUMINUM HYDROXIDE AND MAGNESIUM HYDRO 5 ML: KIT at 05:47

## 2023-03-05 RX ADMIN — Medication 10 ML: at 08:16

## 2023-03-05 RX ADMIN — PANTOPRAZOLE SODIUM 40 MG: 40 TABLET, DELAYED RELEASE ORAL at 05:47

## 2023-03-05 RX ADMIN — HYDROCODONE BITARTRATE AND ACETAMINOPHEN 1 TABLET: 5; 325 TABLET ORAL at 07:36

## 2023-03-05 RX ADMIN — Medication 100 MG: at 08:15

## 2023-03-05 RX ADMIN — Medication 1 MG: at 08:15

## 2023-03-05 RX ADMIN — Medication 1 TABLET: at 08:15

## 2023-03-05 NOTE — NURSING NOTE
Discharge instructions, follow up information medication and educational handouts given. Pt vebalizes understanding. Waiting for Meds to bed.Pt refused wheelchair. Walked out with spouse.

## 2023-03-05 NOTE — NURSING NOTE
Patient has take the seizure precautions off. Patient educated that these precautions can help in case he has a seizure. Patient verbalized understanding and still wants to keep the precautions off.

## 2023-03-05 NOTE — DISCHARGE SUMMARY
[facility] DISCHARGE SUMMARY      Date of Admission: 3/3/2023    Date of Discharge: 3/5/2023     PCP: Tonya Arellano APRN    Admission Diagnosis:   Please see admission H&P    Discharge Diagnosis:   Acute alcohol-induced pancreatitis  Alcohol abuse with withdrawal and reported seizures prior to admission  Acute gout flare  Leukocytosis  (+) D-dimer  Mild hypomagnesemia     Procedures Performed: None     Consults:   Consults     Date and Time Order Name Status Description    3/3/2023  3:42 PM Hospitalist (on-call MD unless specified)              History of Present Illness:  Ruperto Maxwell is a 47 y.o. male who presented to [facility] ED with CC of abdominal pain and concern for alcohol withdrawal with seizure-like episodes at home. Please see admission H&P for complete details.     In the ED, CT head was negative for any acute intracranial abnormalities but did reveal some left maxillary sinus fullness. Workup revealed acute pancreatitis, leukocytosis and gout. D-dimer was positive with no evidence of PE on CT chest PE protocol. Cultures were obtained and IV antibiotics initiated empirically.        Hospital Course  Ruperto Maxwell was admitted to the telemetry floor for further evaluation and treatment. He was made NPO and started on maintenance IVF's with PRN analgesics and antiemetics made available. Vitamin supplementation was ordered with CIWA protocol and PRN Ativan. Antibiotics were not continued on admission with no clear source of infection. He was continued on colchicine.     His pancreatitis was thought to be alcohol-induced. No gallstones were reported on CT. US did not reveal any acute findings in the RUQ. Triglycerides were normal. His leukocytosis resolved and thought to be reactive. His abdominal pain began improving with lipase initially improved. He was placed on a clear liquid diet with orders to advance his diet as tolerated. He remained clinically stable with no signs of severe alcohol  withdrawal or withdrawal seizures. He politely declined any interest in substance abuse programs.     Mr. Maxwell was seen and examined with TREMAINE Cyole on 3/5. He remained afebrile and hemodynamically stable. He felt improved from upon admission and had tolerated some solid food. He was deemed medically stable for discharge and instructions given for close outpatient follow up with his PCP.     Condition on Discharge:  Stable    Vital Signs  Vitals:    03/05/23 0700   BP: 158/97   Pulse: 68   Resp: 18   Temp: 98.1 °F (36.7 °C)   SpO2: 98%       Physical Exam:  General:    Awake, alert, in no acute distress   Heart:      Normal S1 and S2. Regular rate and rhythm. No significant murmur, rubs or gallops appreciated.   Lungs:     Respirations regular, even and unlabored. Lungs clear to auscultation B/L. No wheezes, rales or rhonchi.   Abdomen:   Soft and nontender. No guarding, rebound tenderness or  organomegaly noted. Bowel sounds present x 4.   Extremities:  No clubbing, cyanosis or edema noted. Moves UE and LE equally B/L.     Discharge Disposition:   home      Discharge Medications:     Discharge Medications      New Medications      Instructions Start Date   First Mouthwash (Magic Mouthwash) suspension   5 mL, Swish & Spit, Every 6 Hours Scheduled      Lidocaine Viscous HCl 2 % solution  Commonly known as: XYLOCAINE   10 mL, Oral, Every 3 Hours PRN      multivitamin tablet tablet   1 tablet, Oral, Daily   Start Date: March 6, 2023        Continue These Medications      Instructions Start Date   colchicine 0.6 MG tablet   0.6 mg, Oral, Daily PRN      loratadine 10 MG tablet  Commonly known as: CLARITIN   10 mg, Oral, Daily PRN      omeprazole OTC 20 MG EC tablet  Commonly known as: PriLOSEC OTC   20 mg, Oral, Daily PRN               Discharge Diet:   Dietary Orders (From admission, onward)     Start     Ordered    03/05/23 0818  Diet: Cardiac Diets; Healthy Heart (2-3 Na+); Texture: Regular Texture (IDDSI 7); Fluid  Consistency: Thin (IDDSI 0)  Diet Effective Now        References:    Diet Order [place]   Question Answer Comment   Diets: Cardiac Diets    Cardiac Diet: Healthy Heart (2-3 Na+)    Texture: Regular Texture (IDDSI 7)    Fluid Consistency: Thin (IDDSI 0)        03/05/23 0818                Activity at Discharge:  activity as tolerated    Follow-up Appointments:  Additional Instructions for the Follow-ups that You Need to Schedule     Discharge Follow-up with PCP   As directed       Currently Documented PCP:    Tonya Arellano APRN    PCP Phone Number:    [phone]     Follow Up Details: Follow up with SHANNEN Lugo in 1 week.            Follow-up Information     Tonya Arellano APRN .    Specialty: Family Medicine  Why: Follow up with SHANNEN Lugo in 1 week.  Contact information:  [address] [address] [phone]                             Test Results Pending at Discharge:  Pending Labs     Order Current Status    Blood Culture - Blood, Arm, Right Preliminary result    Blood Culture - Blood, Hand, Right Preliminary result           The 10-year ASCVD risk score (Tanja FRANCO, et al., 2019) is: 4.7%    Values used to calculate the score:      Age: 47 years      Sex: Male      Is Non- : No      Diabetic: No      Tobacco smoker: No      Systolic Blood Pressure: 158 mmHg      Is BP treated: No      HDL Cholesterol: 52 mg/dL      Total Cholesterol: 211 mg/dL      Reddy Scruggs DO  03/05/23  08:22 EST      Time: Greater than 30 minutes spent on this discharge.

## 2023-03-05 NOTE — PLAN OF CARE
Goal Outcome Evaluation:   Patient resting well at this time. CIWA score during the shift has been zero. IVF continue. Pain medicine given x1 during the shift. Tolerating well. Tolerating full liquids and some solid food well. No s/s of any acute distress. HOB elevated. Bed alarm refused, and call bell in reach. Will continue to follow the POC.

## 2023-03-05 NOTE — DISCHARGE INSTR - APPOINTMENTS
SHANNEN Lugo office is closed on the weekend. Will notify patient with follow up appointment on Monday, March 6, 2023

## 2023-03-06 ENCOUNTER — TELEPHONE (OUTPATIENT)
Dept: TELEMETRY | Facility: HOSPITAL | Age: 48
End: 2023-03-06
Payer: COMMERCIAL

## 2023-03-08 LAB
BACTERIA SPEC AEROBE CULT: NORMAL
BACTERIA SPEC AEROBE CULT: NORMAL

## 2023-10-03 ENCOUNTER — OFFICE VISIT (OUTPATIENT)
Dept: SURGERY | Facility: CLINIC | Age: 48
End: 2023-10-03
Payer: COMMERCIAL

## 2023-10-03 VITALS
BODY MASS INDEX: 27.77 KG/M2 | WEIGHT: 205 LBS | SYSTOLIC BLOOD PRESSURE: 138 MMHG | HEIGHT: 72 IN | HEART RATE: 81 BPM | DIASTOLIC BLOOD PRESSURE: 90 MMHG

## 2023-10-03 DIAGNOSIS — R10.11 RUQ ABDOMINAL PAIN: Primary | ICD-10-CM

## 2023-10-03 DIAGNOSIS — R10.9 RIGHT SIDED ABDOMINAL PAIN: ICD-10-CM

## 2023-10-03 PROCEDURE — 99203 OFFICE O/P NEW LOW 30 MIN: CPT

## 2023-10-03 PROCEDURE — 1159F MED LIST DOCD IN RCRD: CPT

## 2023-10-03 PROCEDURE — 1160F RVW MEDS BY RX/DR IN RCRD: CPT

## 2023-10-03 RX ORDER — PANTOPRAZOLE SODIUM 40 MG/1
TABLET, DELAYED RELEASE ORAL
COMMUNITY
Start: 2023-09-02

## 2023-10-03 RX ORDER — HYDROCODONE BITARTRATE AND ACETAMINOPHEN 7.5; 325 MG/1; MG/1
1 TABLET ORAL EVERY 6 HOURS PRN
COMMUNITY
Start: 2023-09-08

## 2023-10-03 RX ORDER — ALLOPURINOL 100 MG/1
100 TABLET ORAL DAILY
COMMUNITY

## 2023-10-03 RX ORDER — ONDANSETRON 4 MG/1
TABLET, ORALLY DISINTEGRATING ORAL
COMMUNITY
Start: 2023-09-02

## 2023-10-03 RX ORDER — LORAZEPAM 2 MG/1
TABLET ORAL
COMMUNITY
Start: 2023-09-08

## 2023-10-03 NOTE — PROGRESS NOTES
Subjective   Ruperto Maxwell is a 48 y.o. male who presents today for Initial Evaluation    Chief Complaint:    Chief Complaint   Patient presents with    Abdominal Pain        History of Present Illness:    History of Present Illness Ruperto is a 48-year-old male who presents with complaints of right-sided abdominal pain.  Patient reports that he has a history of alcoholism as well as a history of pancreatitis in the past.  He was seen in the emergency department at [facility] where he was admitted for elevated LFTs, pancreatic enzymes and nausea and vomiting.  Symptoms did resolve.  Patient reports that he has intermittent right-sided abdominal pain.  Denies any nausea or vomiting.  He reports that he has quit drinking liquor and has started drinking a lot of beer where he is going to gradually decrease his intake.  Denies any aggravating factors.  Reports that he has had a HIDA scan in the past, however, it has been many years ago.  Patient had CT of abdomen and pelvis as well as ultrasound of his gallbladder at [facility].  CT showed questionable wall thickening, ultrasound was negative.  Patient was noted to have fatty liver on CT scan.    The following portions of the patient's history were reviewed and updated as appropriate: allergies, current medications, past family history, past medical history, past social history, past surgical history and problem list.    Past Medical History:  History reviewed. No pertinent past medical history.    Social History:  Social History     Socioeconomic History    Marital status:    Tobacco Use    Smoking status: Never    Smokeless tobacco: Current     Types: Snuff   Vaping Use    Vaping Use: Never used   Substance and Sexual Activity    Alcohol use: Yes     Comment: 1 pint daily    Drug use: Never    Sexual activity: Defer       Family History:  Family History   Problem Relation Age of Onset    No Known Problems Mother     No Known Problems Father        Past  Surgical History:  Past Surgical History:   Procedure Laterality Date    HAND SURGERY         Problem List:  Patient Active Problem List   Diagnosis    Alcohol-induced acute pancreatitis without infection or necrosis    Alcohol withdrawal seizure, with perceptual disturbance    Gout of multiple sites       Allergy:   No Known Allergies     Current Medications:   Current Outpatient Medications   Medication Sig Dispense Refill    allopurinol (ZYLOPRIM) 100 MG tablet Take 1 tablet by mouth Daily.      HYDROcodone-acetaminophen (NORCO) 7.5-325 MG per tablet Take 1 tablet by mouth Every 6 (Six) Hours As Needed. for pain      LORazepam (ATIVAN) 2 MG tablet TAKE ONE TABLET BY MOUTH EVERY 12-24 HOURS AS NEEDED FOR SEIZURES      multivitamin (THERAGRAN) tablet tablet Take 1 tablet by mouth Daily. 30 tablet 0    ondansetron ODT (ZOFRAN-ODT) 4 MG disintegrating tablet DISSOLVE ONE TABLET ON TONGUE EVERY EIGHT HOURS AS NEEDED FOR NAUSEA AND VOMITING      pantoprazole (PROTONIX) 40 MG EC tablet TAKE ONE TABLET BY MOUTH DAILY FOR GASTRITIS       No current facility-administered medications for this visit.       Review of Systems:    Review of Systems   Constitutional:  Negative for activity change.   HENT:  Negative for congestion.    Eyes:  Negative for blurred vision.   Respiratory:  Negative for shortness of breath.    Cardiovascular:  Negative for chest pain.   Gastrointestinal:  Positive for abdominal pain.   Endocrine: Negative for cold intolerance.   Genitourinary:  Negative for flank pain.   Musculoskeletal:  Negative for arthralgias.   Skin:  Negative for bruise.   Allergic/Immunologic: Negative for environmental allergies.   Neurological:  Negative for confusion.   Hematological:  Negative for adenopathy.   Psychiatric/Behavioral:  Negative for agitation.        Physical Exam:   Physical Exam  Constitutional:       Appearance: Normal appearance.   HENT:      Head: Normocephalic and atraumatic.      Right Ear: External  "ear normal.      Left Ear: External ear normal.   Eyes:      Conjunctiva/sclera: Conjunctivae normal.      Pupils: Pupils are equal, round, and reactive to light.   Cardiovascular:      Rate and Rhythm: Normal rate.      Pulses: Normal pulses.   Pulmonary:      Effort: Pulmonary effort is normal.   Abdominal:      General: Abdomen is flat.      Palpations: Abdomen is soft.      Tenderness: There is abdominal tenderness (Mild tenderness upon palpation of right abdomen/flank).   Musculoskeletal:         General: Normal range of motion.      Cervical back: Normal range of motion and neck supple.   Skin:     General: Skin is warm and dry.      Capillary Refill: Capillary refill takes less than 2 seconds.   Neurological:      General: No focal deficit present.      Mental Status: He is alert and oriented to person, place, and time.   Psychiatric:         Mood and Affect: Mood normal.         Behavior: Behavior normal.       Vitals:  Blood pressure 138/90, pulse 81, height 182.9 cm (72.01\"), weight 93 kg (205 lb).   Body mass index is 27.8 kg/m².     Imaging: I reviewed CT scan and ultrasound from [facility].  Ultrasound of the gallbladder was unremarkable.  CT of the abdomen revealed diffuse hepatic steatosis with questionable gallbladder wall thickening.     Assessment & Plan   Diagnoses and all orders for this visit:    1. RUQ abdominal pain (Primary)  -     NM HIDA Scan With Pharmacological Intervention; Future  -     Case Request; Standing  -     Case Request    2. Right sided abdominal pain  -     Case Request; Standing  -     Case Request    Other orders  -     Follow Anesthesia Guidelines / Protocol; Future  -     Follow Anesthesia Guidelines / Protocol; Standing  -     Verify / Perform Chlorhexidine Skin Prep; Standing  -     Verify / Perform Chlorhexidine Skin Prep if Indicated (If Not Already Completed); Standing  -     Obtain Informed Consent; Future  -     Provide NPO Instructions to Patient; Future  -  "    Chlorhexidine Skin Prep; Future      Ruperto is a 48-year-old male who presents with right-sided abdominal pain.  Patient has had CT scan and ultrasound of gallbladder.  I ordered a HIDA scan to further evaluate gallbladder function.  He will also undergo an EGD with Dr. Rice to further evaluate right-sided abdominal pain.  Patient verbalized agreement with plan.  He verbalized understanding to present to the nearest emergency department if he develops severe abdominal pain, nausea, vomiting or jaundice.    Visit Diagnoses:    ICD-10-CM ICD-9-CM   1. RUQ abdominal pain  R10.11 789.01   2. Right sided abdominal pain  R10.9 789.09         MEDS ORDERED DURING VISIT:  No orders of the defined types were placed in this encounter.      Return for Follow-up postop.             This document has been electronically signed by SHANNEN Randle  October 3, 2023 14:30 EDT    Please note that portions of this note were completed with a voice recognition program.

## 2023-10-03 NOTE — H&P (VIEW-ONLY)
Subjective   Ruperto Maxwell is a 48 y.o. male who presents today for Initial Evaluation    Chief Complaint:    Chief Complaint   Patient presents with    Abdominal Pain        History of Present Illness:    History of Present Illness Ruperto is a 48-year-old male who presents with complaints of right-sided abdominal pain.  Patient reports that he has a history of alcoholism as well as a history of pancreatitis in the past.  He was seen in the emergency department at [facility] where he was admitted for elevated LFTs, pancreatic enzymes and nausea and vomiting.  Symptoms did resolve.  Patient reports that he has intermittent right-sided abdominal pain.  Denies any nausea or vomiting.  He reports that he has quit drinking liquor and has started drinking a lot of beer where he is going to gradually decrease his intake.  Denies any aggravating factors.  Reports that he has had a HIDA scan in the past, however, it has been many years ago.  Patient had CT of abdomen and pelvis as well as ultrasound of his gallbladder at [facility].  CT showed questionable wall thickening, ultrasound was negative.  Patient was noted to have fatty liver on CT scan.    The following portions of the patient's history were reviewed and updated as appropriate: allergies, current medications, past family history, past medical history, past social history, past surgical history and problem list.    Past Medical History:  History reviewed. No pertinent past medical history.    Social History:  Social History     Socioeconomic History    Marital status:    Tobacco Use    Smoking status: Never    Smokeless tobacco: Current     Types: Snuff   Vaping Use    Vaping Use: Never used   Substance and Sexual Activity    Alcohol use: Yes     Comment: 1 pint daily    Drug use: Never    Sexual activity: Defer       Family History:  Family History   Problem Relation Age of Onset    No Known Problems Mother     No Known Problems Father        Past  Surgical History:  Past Surgical History:   Procedure Laterality Date    HAND SURGERY         Problem List:  Patient Active Problem List   Diagnosis    Alcohol-induced acute pancreatitis without infection or necrosis    Alcohol withdrawal seizure, with perceptual disturbance    Gout of multiple sites       Allergy:   No Known Allergies     Current Medications:   Current Outpatient Medications   Medication Sig Dispense Refill    allopurinol (ZYLOPRIM) 100 MG tablet Take 1 tablet by mouth Daily.      HYDROcodone-acetaminophen (NORCO) 7.5-325 MG per tablet Take 1 tablet by mouth Every 6 (Six) Hours As Needed. for pain      LORazepam (ATIVAN) 2 MG tablet TAKE ONE TABLET BY MOUTH EVERY 12-24 HOURS AS NEEDED FOR SEIZURES      multivitamin (THERAGRAN) tablet tablet Take 1 tablet by mouth Daily. 30 tablet 0    ondansetron ODT (ZOFRAN-ODT) 4 MG disintegrating tablet DISSOLVE ONE TABLET ON TONGUE EVERY EIGHT HOURS AS NEEDED FOR NAUSEA AND VOMITING      pantoprazole (PROTONIX) 40 MG EC tablet TAKE ONE TABLET BY MOUTH DAILY FOR GASTRITIS       No current facility-administered medications for this visit.       Review of Systems:    Review of Systems   Constitutional:  Negative for activity change.   HENT:  Negative for congestion.    Eyes:  Negative for blurred vision.   Respiratory:  Negative for shortness of breath.    Cardiovascular:  Negative for chest pain.   Gastrointestinal:  Positive for abdominal pain.   Endocrine: Negative for cold intolerance.   Genitourinary:  Negative for flank pain.   Musculoskeletal:  Negative for arthralgias.   Skin:  Negative for bruise.   Allergic/Immunologic: Negative for environmental allergies.   Neurological:  Negative for confusion.   Hematological:  Negative for adenopathy.   Psychiatric/Behavioral:  Negative for agitation.        Physical Exam:   Physical Exam  Constitutional:       Appearance: Normal appearance.   HENT:      Head: Normocephalic and atraumatic.      Right Ear: External  "ear normal.      Left Ear: External ear normal.   Eyes:      Conjunctiva/sclera: Conjunctivae normal.      Pupils: Pupils are equal, round, and reactive to light.   Cardiovascular:      Rate and Rhythm: Normal rate.      Pulses: Normal pulses.   Pulmonary:      Effort: Pulmonary effort is normal.   Abdominal:      General: Abdomen is flat.      Palpations: Abdomen is soft.      Tenderness: There is abdominal tenderness (Mild tenderness upon palpation of right abdomen/flank).   Musculoskeletal:         General: Normal range of motion.      Cervical back: Normal range of motion and neck supple.   Skin:     General: Skin is warm and dry.      Capillary Refill: Capillary refill takes less than 2 seconds.   Neurological:      General: No focal deficit present.      Mental Status: He is alert and oriented to person, place, and time.   Psychiatric:         Mood and Affect: Mood normal.         Behavior: Behavior normal.       Vitals:  Blood pressure 138/90, pulse 81, height 182.9 cm (72.01\"), weight 93 kg (205 lb).   Body mass index is 27.8 kg/m².     Imaging: I reviewed CT scan and ultrasound from [facility].  Ultrasound of the gallbladder was unremarkable.  CT of the abdomen revealed diffuse hepatic steatosis with questionable gallbladder wall thickening.     Assessment & Plan   Diagnoses and all orders for this visit:    1. RUQ abdominal pain (Primary)  -     NM HIDA Scan With Pharmacological Intervention; Future  -     Case Request; Standing  -     Case Request    2. Right sided abdominal pain  -     Case Request; Standing  -     Case Request    Other orders  -     Follow Anesthesia Guidelines / Protocol; Future  -     Follow Anesthesia Guidelines / Protocol; Standing  -     Verify / Perform Chlorhexidine Skin Prep; Standing  -     Verify / Perform Chlorhexidine Skin Prep if Indicated (If Not Already Completed); Standing  -     Obtain Informed Consent; Future  -     Provide NPO Instructions to Patient; Future  -  "    Chlorhexidine Skin Prep; Future      Ruperto is a 48-year-old male who presents with right-sided abdominal pain.  Patient has had CT scan and ultrasound of gallbladder.  I ordered a HIDA scan to further evaluate gallbladder function.  He will also undergo an EGD with Dr. Rice to further evaluate right-sided abdominal pain.  Patient verbalized agreement with plan.  He verbalized understanding to present to the nearest emergency department if he develops severe abdominal pain, nausea, vomiting or jaundice.    Visit Diagnoses:    ICD-10-CM ICD-9-CM   1. RUQ abdominal pain  R10.11 789.01   2. Right sided abdominal pain  R10.9 789.09         MEDS ORDERED DURING VISIT:  No orders of the defined types were placed in this encounter.      Return for Follow-up postop.             This document has been electronically signed by SHANNEN Randle  October 3, 2023 14:30 EDT    Please note that portions of this note were completed with a voice recognition program.

## 2023-10-07 ENCOUNTER — HOSPITAL ENCOUNTER (OUTPATIENT)
Dept: NUCLEAR MEDICINE | Facility: HOSPITAL | Age: 48
Discharge: HOME | End: 2023-10-07
Payer: COMMERCIAL

## 2023-10-07 DIAGNOSIS — R10.11 RUQ ABDOMINAL PAIN: ICD-10-CM

## 2023-10-07 PROCEDURE — 78227 HEPATOBIL SYST IMAGE W/DRUG: CPT

## 2023-10-07 PROCEDURE — 25010000002 SINCALIDE PER 5 MCG

## 2023-10-07 PROCEDURE — A9537 TC99M MEBROFENIN: HCPCS

## 2023-10-07 PROCEDURE — 0 TECHNETIUM TC 99M MEBROFENIN KIT

## 2023-10-07 RX ORDER — SINCALIDE 5 UG/5ML
0.04 INJECTION, POWDER, LYOPHILIZED, FOR SOLUTION INTRAVENOUS
Status: COMPLETED | OUTPATIENT
Start: 2023-10-07 | End: 2023-10-07

## 2023-10-07 RX ORDER — KIT FOR THE PREPARATION OF TECHNETIUM TC 99M MEBROFENIN 45 MG/10ML
1 INJECTION, POWDER, LYOPHILIZED, FOR SOLUTION INTRAVENOUS
Status: COMPLETED | OUTPATIENT
Start: 2023-10-07 | End: 2023-10-07

## 2023-10-07 RX ADMIN — MEBROFENIN 1 DOSE: 45 INJECTION, POWDER, LYOPHILIZED, FOR SOLUTION INTRAVENOUS at 16:35

## 2023-10-07 RX ADMIN — SINCALIDE 3.7 MCG: 5 INJECTION, POWDER, LYOPHILIZED, FOR SOLUTION INTRAVENOUS at 17:24

## 2023-10-09 ENCOUNTER — TELEPHONE (OUTPATIENT)
Dept: SURGERY | Facility: CLINIC | Age: 48
End: 2023-10-09
Payer: COMMERCIAL

## 2023-10-09 PROBLEM — R10.9 RIGHT SIDED ABDOMINAL PAIN: Status: ACTIVE | Noted: 2023-10-03

## 2023-10-09 PROBLEM — R10.11 RUQ ABDOMINAL PAIN: Status: ACTIVE | Noted: 2023-10-03

## 2023-10-09 NOTE — TELEPHONE ENCOUNTER
Talked with patients mother to let them know that his hida scan was good, patients mother voiced understanding.

## 2023-10-10 ENCOUNTER — TELEPHONE (OUTPATIENT)
Dept: SURGERY | Facility: CLINIC | Age: 48
End: 2023-10-10
Payer: COMMERCIAL

## 2023-10-10 NOTE — TELEPHONE ENCOUNTER
SX: 11/1/2023 @ 6:30AM    NPO AFTER MIDNIGHT     MUST HAVE      PATIENT IS AWARE WITH NO QUESTIONS AT THIS TIME.     GAY 10/10/23

## 2023-11-01 ENCOUNTER — HOSPITAL ENCOUNTER (OUTPATIENT)
Facility: HOSPITAL | Age: 48
Setting detail: HOSPITAL OUTPATIENT SURGERY
Discharge: HOME | End: 2023-11-01
Attending: SURGERY | Admitting: SURGERY
Payer: COMMERCIAL

## 2023-11-01 ENCOUNTER — ANESTHESIA (OUTPATIENT)
Dept: PERIOP | Facility: HOSPITAL | Age: 48
End: 2023-11-01
Payer: COMMERCIAL

## 2023-11-01 ENCOUNTER — ANESTHESIA EVENT (OUTPATIENT)
Dept: PERIOP | Facility: HOSPITAL | Age: 48
End: 2023-11-01
Payer: COMMERCIAL

## 2023-11-01 VITALS
HEIGHT: 72 IN | WEIGHT: 205 LBS | SYSTOLIC BLOOD PRESSURE: 134 MMHG | HEART RATE: 77 BPM | BODY MASS INDEX: 27.77 KG/M2 | OXYGEN SATURATION: 96 % | DIASTOLIC BLOOD PRESSURE: 98 MMHG | RESPIRATION RATE: 18 BRPM | TEMPERATURE: 97.6 F

## 2023-11-01 DIAGNOSIS — R10.11 RUQ ABDOMINAL PAIN: ICD-10-CM

## 2023-11-01 DIAGNOSIS — R10.9 RIGHT SIDED ABDOMINAL PAIN: ICD-10-CM

## 2023-11-01 PROCEDURE — 25010000002 PROPOFOL 200 MG/20ML EMULSION: Performed by: NURSE ANESTHETIST, CERTIFIED REGISTERED

## 2023-11-01 RX ORDER — PROPOFOL 10 MG/ML
INJECTION, EMULSION INTRAVENOUS AS NEEDED
Status: DISCONTINUED | OUTPATIENT
Start: 2023-11-01 | End: 2023-11-01 | Stop reason: SURG

## 2023-11-01 RX ORDER — MIDAZOLAM HYDROCHLORIDE 1 MG/ML
1 INJECTION INTRAMUSCULAR; INTRAVENOUS
Status: DISCONTINUED | OUTPATIENT
Start: 2023-11-01 | End: 2023-11-01 | Stop reason: HOSPADM

## 2023-11-01 RX ORDER — SODIUM CHLORIDE, SODIUM LACTATE, POTASSIUM CHLORIDE, CALCIUM CHLORIDE 600; 310; 30; 20 MG/100ML; MG/100ML; MG/100ML; MG/100ML
100 INJECTION, SOLUTION INTRAVENOUS ONCE AS NEEDED
Status: DISCONTINUED | OUTPATIENT
Start: 2023-11-01 | End: 2023-11-01 | Stop reason: HOSPADM

## 2023-11-01 RX ORDER — OXYCODONE HYDROCHLORIDE AND ACETAMINOPHEN 5; 325 MG/1; MG/1
1 TABLET ORAL ONCE AS NEEDED
Status: DISCONTINUED | OUTPATIENT
Start: 2023-11-01 | End: 2023-11-01 | Stop reason: HOSPADM

## 2023-11-01 RX ORDER — SODIUM CHLORIDE 0.9 % (FLUSH) 0.9 %
10 SYRINGE (ML) INJECTION EVERY 12 HOURS SCHEDULED
Status: DISCONTINUED | OUTPATIENT
Start: 2023-11-01 | End: 2023-11-01 | Stop reason: HOSPADM

## 2023-11-01 RX ORDER — ONDANSETRON 2 MG/ML
4 INJECTION INTRAMUSCULAR; INTRAVENOUS AS NEEDED
Status: DISCONTINUED | OUTPATIENT
Start: 2023-11-01 | End: 2023-11-01 | Stop reason: HOSPADM

## 2023-11-01 RX ORDER — SODIUM CHLORIDE 0.9 % (FLUSH) 0.9 %
10 SYRINGE (ML) INJECTION AS NEEDED
Status: DISCONTINUED | OUTPATIENT
Start: 2023-11-01 | End: 2023-11-01 | Stop reason: HOSPADM

## 2023-11-01 RX ORDER — SODIUM CHLORIDE, SODIUM LACTATE, POTASSIUM CHLORIDE, CALCIUM CHLORIDE 600; 310; 30; 20 MG/100ML; MG/100ML; MG/100ML; MG/100ML
125 INJECTION, SOLUTION INTRAVENOUS ONCE
Status: DISCONTINUED | OUTPATIENT
Start: 2023-11-01 | End: 2023-11-01 | Stop reason: HOSPADM

## 2023-11-01 RX ORDER — IPRATROPIUM BROMIDE AND ALBUTEROL SULFATE 2.5; .5 MG/3ML; MG/3ML
3 SOLUTION RESPIRATORY (INHALATION) ONCE AS NEEDED
Status: DISCONTINUED | OUTPATIENT
Start: 2023-11-01 | End: 2023-11-01 | Stop reason: HOSPADM

## 2023-11-01 RX ORDER — SODIUM CHLORIDE 9 MG/ML
40 INJECTION, SOLUTION INTRAVENOUS AS NEEDED
Status: DISCONTINUED | OUTPATIENT
Start: 2023-11-01 | End: 2023-11-01 | Stop reason: HOSPADM

## 2023-11-01 RX ORDER — FENTANYL CITRATE 50 UG/ML
50 INJECTION, SOLUTION INTRAMUSCULAR; INTRAVENOUS
Status: DISCONTINUED | OUTPATIENT
Start: 2023-11-01 | End: 2023-11-01 | Stop reason: HOSPADM

## 2023-11-01 RX ADMIN — PROPOFOL 250 MCG/KG/MIN: 10 INJECTION, EMULSION INTRAVENOUS at 08:23

## 2023-11-01 RX ADMIN — PROPOFOL 50 MG: 10 INJECTION, EMULSION INTRAVENOUS at 08:22

## 2023-11-01 NOTE — ANESTHESIA POSTPROCEDURE EVALUATION
Patient: Ruperto Maxwell    Procedure Summary       Date: 11/01/23 Room / Location: [facility] OR [address] [place] [address] COR OR    Anesthesia Start: 0820 Anesthesia Stop: 0832    Procedure: ESOPHAGOGASTRODUODENOSCOPY WITH ANESTHESIA (Esophagus) Diagnosis:       RUQ abdominal pain      Right sided abdominal pain      (RUQ abdominal pain [R10.11])      (Right sided abdominal pain [R10.9])    Surgeons: Torin Rice MD Provider: Hardeep Tiwari MD    Anesthesia Type: general ASA Status: 3            Anesthesia Type: general    Vitals  Vitals Value Taken Time   /98 11/01/23 0855   Temp 97.6 °F (36.4 °C) 11/01/23 0833   Pulse 80 11/01/23 0856   Resp 18 11/01/23 0833   SpO2 95 % 11/01/23 0856   Vitals shown include unfiled device data.        Post Anesthesia Care and Evaluation    Patient location during evaluation: PHASE II  Patient participation: complete - patient participated  Level of consciousness: awake and alert  Pain score: 0  Pain management: adequate    Airway patency: patent  Anesthetic complications: No anesthetic complications    Cardiovascular status: acceptable  Respiratory status: acceptable  Hydration status: acceptable

## 2023-11-01 NOTE — ANESTHESIA PREPROCEDURE EVALUATION
Anesthesia Evaluation     Patient summary reviewed and Nursing notes reviewed   no history of anesthetic complications:   NPO Solid Status: > 8 hours  NPO Liquid Status: > 8 hours           Airway   Mallampati: II  TM distance: >3 FB  Neck ROM: full  Dental - normal exam     Pulmonary - negative pulmonary ROS    breath sounds clear to auscultation  Cardiovascular - negative cardio ROS  Exercise tolerance: good (4-7 METS)    Rhythm: regular  Rate: normal        Neuro/Psych  (+) seizures (from alcohol withdrawal) poorly controlled  GI/Hepatic/Renal/Endo    (+) obesity, GERD    Musculoskeletal     Abdominal   (+) obese    Abdomen: soft.   Substance History   (+) alcohol use (heavy use)     OB/GYN          Other   arthritis,     ROS/Med Hx Other:   Sinus tachycardia  Otherwise normal ECG  No previous ECGs available  Confirmed by Edurad Wilburn ([id number]) on 3/3/2023 2:51:57 PM                  Anesthesia Plan    ASA 3     general     intravenous induction     Anesthetic plan, risks, benefits, and alternatives have been provided, discussed and informed consent has been obtained with: patient.  Pre-procedure education provided  Use of blood products discussed with  Consented to blood products.    Plan discussed with CRNA.    CODE STATUS:

## 2023-11-02 LAB — REF LAB TEST METHOD: NORMAL

## 2023-11-15 ENCOUNTER — OFFICE VISIT (OUTPATIENT)
Dept: SURGERY | Facility: CLINIC | Age: 48
End: 2023-11-15
Payer: COMMERCIAL

## 2023-11-15 ENCOUNTER — LAB (OUTPATIENT)
Dept: LAB | Facility: HOSPITAL | Age: 48
End: 2023-11-15
Payer: COMMERCIAL

## 2023-11-15 VITALS — HEIGHT: 72 IN | WEIGHT: 205 LBS | BODY MASS INDEX: 27.77 KG/M2

## 2023-11-15 DIAGNOSIS — K76.9 LIVER DISEASE: ICD-10-CM

## 2023-11-15 DIAGNOSIS — K76.9 LIVER DISEASE: Primary | ICD-10-CM

## 2023-11-15 DIAGNOSIS — R10.9 RIGHT SIDED ABDOMINAL PAIN: ICD-10-CM

## 2023-11-15 LAB
ALBUMIN SERPL-MCNC: 4.2 G/DL (ref 3.5–5.2)
ALBUMIN/GLOB SERPL: 1.3 G/DL
ALP SERPL-CCNC: 149 U/L (ref 39–117)
ALT SERPL W P-5'-P-CCNC: 20 U/L (ref 1–41)
ANION GAP SERPL CALCULATED.3IONS-SCNC: 12 MMOL/L (ref 5–15)
AST SERPL-CCNC: 39 U/L (ref 1–40)
BASOPHILS # BLD AUTO: 0.06 10*3/MM3 (ref 0–0.2)
BASOPHILS NFR BLD AUTO: 1 % (ref 0–1.5)
BILIRUB SERPL-MCNC: 1.2 MG/DL (ref 0–1.2)
BUN SERPL-MCNC: 5 MG/DL (ref 6–20)
BUN/CREAT SERPL: 5.4 (ref 7–25)
CALCIUM SPEC-SCNC: 9.3 MG/DL (ref 8.6–10.5)
CHLORIDE SERPL-SCNC: 102 MMOL/L (ref 98–107)
CO2 SERPL-SCNC: 25 MMOL/L (ref 22–29)
CREAT SERPL-MCNC: 0.92 MG/DL (ref 0.76–1.27)
DEPRECATED RDW RBC AUTO: 44.2 FL (ref 37–54)
EGFRCR SERPLBLD CKD-EPI 2021: 101.3 ML/MIN/1.73
EOSINOPHIL # BLD AUTO: 0.15 10*3/MM3 (ref 0–0.4)
EOSINOPHIL NFR BLD AUTO: 2.4 % (ref 0.3–6.2)
ERYTHROCYTE [DISTWIDTH] IN BLOOD BY AUTOMATED COUNT: 12.2 % (ref 12.3–15.4)
GLOBULIN UR ELPH-MCNC: 3.2 GM/DL
GLUCOSE SERPL-MCNC: 101 MG/DL (ref 65–99)
HCT VFR BLD AUTO: 47.3 % (ref 37.5–51)
HGB BLD-MCNC: 15.7 G/DL (ref 13–17.7)
IMM GRANULOCYTES # BLD AUTO: 0.01 10*3/MM3 (ref 0–0.05)
IMM GRANULOCYTES NFR BLD AUTO: 0.2 % (ref 0–0.5)
INR PPP: 0.96 (ref 0.9–1.1)
LYMPHOCYTES # BLD AUTO: 2.2 10*3/MM3 (ref 0.7–3.1)
LYMPHOCYTES NFR BLD AUTO: 35 % (ref 19.6–45.3)
MCH RBC QN AUTO: 32.2 PG (ref 26.6–33)
MCHC RBC AUTO-ENTMCNC: 33.2 G/DL (ref 31.5–35.7)
MCV RBC AUTO: 97.1 FL (ref 79–97)
MONOCYTES # BLD AUTO: 0.76 10*3/MM3 (ref 0.1–0.9)
MONOCYTES NFR BLD AUTO: 12.1 % (ref 5–12)
NEUTROPHILS NFR BLD AUTO: 3.1 10*3/MM3 (ref 1.7–7)
NEUTROPHILS NFR BLD AUTO: 49.3 % (ref 42.7–76)
NRBC BLD AUTO-RTO: 0 /100 WBC (ref 0–0.2)
PLATELET # BLD AUTO: 215 10*3/MM3 (ref 140–450)
PMV BLD AUTO: 10.1 FL (ref 6–12)
POTASSIUM SERPL-SCNC: 3.9 MMOL/L (ref 3.5–5.2)
PROT SERPL-MCNC: 7.4 G/DL (ref 6–8.5)
PROTHROMBIN TIME: 13.3 SECONDS (ref 12.1–14.7)
RBC # BLD AUTO: 4.87 10*6/MM3 (ref 4.14–5.8)
SODIUM SERPL-SCNC: 139 MMOL/L (ref 136–145)
WBC NRBC COR # BLD: 6.28 10*3/MM3 (ref 3.4–10.8)

## 2023-11-15 PROCEDURE — 85025 COMPLETE CBC W/AUTO DIFF WBC: CPT

## 2023-11-15 PROCEDURE — 80053 COMPREHEN METABOLIC PANEL: CPT

## 2023-11-15 PROCEDURE — 36415 COLL VENOUS BLD VENIPUNCTURE: CPT

## 2023-11-15 PROCEDURE — 85610 PROTHROMBIN TIME: CPT

## 2023-11-15 NOTE — PROGRESS NOTES
Subjective   Ruperto Maxwell is a 48 y.o. male who presents today for Initial Evaluation    Chief Complaint:    Chief Complaint   Patient presents with    Post-op        History of Present Illness:    History of Present Illness Ruperto is a 48-year-old male who presents for follow-up visit status post EGD with Dr. Rice.  Patient also had HIDA scan that revealed a normal ejection fraction of his gallbladder.  EGD was also noted to be normal.  Patient and family report that they were originally referred to our clinic to discuss possibly getting a liver biopsy to see if patient has any liver damage as he has a history of alcoholism.  Patient reports that he is drink liquor and beer daily times the past 30 years.  Reports that he has developed abdominal/flank pain that began after an episode of pancreatitis.  Reports that this occurred sometime before his last visit.    The following portions of the patient's history were reviewed and updated as appropriate: allergies, current medications, past family history, past medical history, past social history, past surgical history and problem list.    Past Medical History:  Past Medical History:   Diagnosis Date    GERD (gastroesophageal reflux disease)     Seizures     last one was a month ago       Social History:  Social History     Socioeconomic History    Marital status:    Tobacco Use    Smoking status: Never    Smokeless tobacco: Current     Types: Snuff   Vaping Use    Vaping Use: Never used   Substance and Sexual Activity    Alcohol use: Yes     Alcohol/week: 10.0 standard drinks of alcohol     Types: 10 Cans of beer per week     Comment: 1 pint daily    Drug use: Never    Sexual activity: Defer       Family History:  Family History   Problem Relation Age of Onset    No Known Problems Mother     No Known Problems Father        Past Surgical History:  Past Surgical History:   Procedure Laterality Date    ENDOSCOPY N/A 11/1/2023    Procedure:  ESOPHAGOGASTRODUODENOSCOPY WITH ANESTHESIA;  Surgeon: Torin Rice MD;  Location: [facility];  Service: Gastroenterology;  Laterality: N/A;    HAND SURGERY         Problem List:  Patient Active Problem List   Diagnosis    Alcohol-induced acute pancreatitis without infection or necrosis    Alcohol withdrawal seizure, with perceptual disturbance    Gout of multiple sites    RUQ abdominal pain    Right sided abdominal pain       Allergy:   No Known Allergies     Current Medications:   Current Outpatient Medications   Medication Sig Dispense Refill    allopurinol (ZYLOPRIM) 100 MG tablet Take 1 tablet by mouth Daily.      HYDROcodone-acetaminophen (NORCO) 7.5-325 MG per tablet Take 1 tablet by mouth Every 6 (Six) Hours As Needed. for pain      LORazepam (ATIVAN) 2 MG tablet TAKE ONE TABLET BY MOUTH EVERY 12-24 HOURS AS NEEDED FOR SEIZURES      multivitamin (THERAGRAN) tablet tablet Take 1 tablet by mouth Daily. 30 tablet 0    ondansetron ODT (ZOFRAN-ODT) 4 MG disintegrating tablet DISSOLVE ONE TABLET ON TONGUE EVERY EIGHT HOURS AS NEEDED FOR NAUSEA AND VOMITING      pantoprazole (PROTONIX) 40 MG EC tablet TAKE ONE TABLET BY MOUTH DAILY FOR GASTRITIS       No current facility-administered medications for this visit.       Review of Systems:    Review of Systems   Constitutional:  Negative for activity change.   HENT:  Negative for congestion.    Eyes:  Negative for blurred vision.   Respiratory:  Negative for shortness of breath.    Cardiovascular:  Negative for chest pain.   Gastrointestinal:  Positive for abdominal pain.   Endocrine: Negative for cold intolerance.   Genitourinary:  Negative for flank pain.   Musculoskeletal:  Negative for arthralgias.   Skin:  Negative for color change and bruise.   Allergic/Immunologic: Negative for environmental allergies.   Neurological:  Negative for confusion.   Hematological:  Negative for adenopathy.   Psychiatric/Behavioral:  Negative for agitation.          Physical  "Exam:   Physical Exam  Constitutional:       Appearance: Normal appearance.   HENT:      Head: Normocephalic and atraumatic.      Right Ear: External ear normal.      Left Ear: External ear normal.   Eyes:      Conjunctiva/sclera: Conjunctivae normal.      Pupils: Pupils are equal, round, and reactive to light.   Cardiovascular:      Rate and Rhythm: Normal rate and regular rhythm.      Pulses: Normal pulses.   Pulmonary:      Effort: Pulmonary effort is normal.   Abdominal:      General: Abdomen is flat.      Palpations: Abdomen is soft.      Tenderness: There is abdominal tenderness (Right flank/lower quadrant).   Musculoskeletal:         General: Normal range of motion.      Cervical back: Normal range of motion and neck supple.   Skin:     General: Skin is warm and dry.      Capillary Refill: Capillary refill takes less than 2 seconds.   Neurological:      General: No focal deficit present.      Mental Status: He is alert and oriented to person, place, and time.   Psychiatric:         Mood and Affect: Mood normal.         Behavior: Behavior normal.         Vitals:  Height 182.9 cm (72.01\"), weight 93 kg (205 lb).   Body mass index is 27.8 kg/m².      Assessment & Plan   Diagnoses and all orders for this visit:    1. Liver disease (Primary)  -     Comprehensive Metabolic Panel; Future  -     CBC & Differential; Future  -     Protime-INR; Future  -     US Guided Liver Biopsy; Future    2. Right sided abdominal pain    Other orders  -     TISSUE EXAM, [facility] ([facility]); Standing      Ruperto is a 48-year-old male who presents for a follow-up visit status post EGD with Dr. Rice.  I discussed findings with patient and family member today.  They report that patient was originally referred for a possible liver biopsy to inquire about liver damage due to his past history of alcohol abuse.  I spoke with Dr. Rice about this case, he will be referred to IR for an ultrasound-guided liver biopsy.  Patient verbalized " understanding.  We also discussed the potential need for referring to GI.  He is already established with a [facility] and reports that he can follow-up there.  Once the radiology procedure is done I will track results and call patient with them.    Visit Diagnoses:    ICD-10-CM ICD-9-CM   1. Liver disease  K76.9 573.9   2. Right sided abdominal pain  R10.9 789.09         MEDS ORDERED DURING VISIT:  No orders of the defined types were placed in this encounter.      Return if symptoms worsen or fail to improve.             This document has been electronically signed by SHANNEN Randle  November 15, 2023 14:21 EST    Please note that portions of this note were completed with a voice recognition program.

## 2023-12-14 ENCOUNTER — APPOINTMENT (OUTPATIENT)
Dept: CT IMAGING | Facility: HOSPITAL | Age: 48
End: 2023-12-14
Payer: COMMERCIAL

## 2023-12-14 ENCOUNTER — HOSPITAL ENCOUNTER (OUTPATIENT)
Dept: ULTRASOUND IMAGING | Facility: HOSPITAL | Age: 48
Discharge: HOME | End: 2023-12-14
Payer: COMMERCIAL

## 2023-12-14 ENCOUNTER — APPOINTMENT (OUTPATIENT)
Dept: GENERAL RADIOLOGY | Facility: HOSPITAL | Age: 48
End: 2023-12-14
Payer: COMMERCIAL

## 2023-12-14 ENCOUNTER — HOSPITAL ENCOUNTER (EMERGENCY)
Facility: HOSPITAL | Age: 48
Discharge: HOME | End: 2023-12-14
Attending: STUDENT IN AN ORGANIZED HEALTH CARE EDUCATION/TRAINING PROGRAM
Payer: COMMERCIAL

## 2023-12-14 VITALS
HEIGHT: 72 IN | BODY MASS INDEX: 28.44 KG/M2 | OXYGEN SATURATION: 98 % | RESPIRATION RATE: 18 BRPM | DIASTOLIC BLOOD PRESSURE: 100 MMHG | HEART RATE: 72 BPM | SYSTOLIC BLOOD PRESSURE: 158 MMHG | WEIGHT: 210 LBS | TEMPERATURE: 98.4 F

## 2023-12-14 VITALS
BODY MASS INDEX: 28.44 KG/M2 | SYSTOLIC BLOOD PRESSURE: 145 MMHG | DIASTOLIC BLOOD PRESSURE: 95 MMHG | HEART RATE: 62 BPM | HEIGHT: 72 IN | RESPIRATION RATE: 18 BRPM | WEIGHT: 210 LBS | OXYGEN SATURATION: 95 %

## 2023-12-14 DIAGNOSIS — G89.18 POST-OP PAIN: ICD-10-CM

## 2023-12-14 DIAGNOSIS — N39.0 ACUTE UTI: Primary | ICD-10-CM

## 2023-12-14 DIAGNOSIS — K76.9 LIVER DISEASE: ICD-10-CM

## 2023-12-14 LAB
ALBUMIN SERPL-MCNC: 3.6 G/DL (ref 3.5–5.2)
ALBUMIN/GLOB SERPL: 1.1 G/DL
ALP SERPL-CCNC: 175 U/L (ref 39–117)
ALT SERPL W P-5'-P-CCNC: 22 U/L (ref 1–41)
ANION GAP SERPL CALCULATED.3IONS-SCNC: 14.9 MMOL/L (ref 5–15)
AST SERPL-CCNC: 41 U/L (ref 1–40)
BACTERIA UR QL AUTO: ABNORMAL /HPF
BASOPHILS # BLD AUTO: 0.04 10*3/MM3 (ref 0–0.2)
BASOPHILS NFR BLD AUTO: 0.8 % (ref 0–1.5)
BILIRUB SERPL-MCNC: 1.7 MG/DL (ref 0–1.2)
BILIRUB UR QL STRIP: ABNORMAL
BUN SERPL-MCNC: 5 MG/DL (ref 6–20)
BUN/CREAT SERPL: 5.7 (ref 7–25)
CALCIUM SPEC-SCNC: 8.9 MG/DL (ref 8.6–10.5)
CHLORIDE SERPL-SCNC: 104 MMOL/L (ref 98–107)
CLARITY UR: ABNORMAL
CO2 SERPL-SCNC: 23.1 MMOL/L (ref 22–29)
COLOR UR: ABNORMAL
CREAT SERPL-MCNC: 0.87 MG/DL (ref 0.76–1.27)
DEPRECATED RDW RBC AUTO: 42.5 FL (ref 37–54)
EGFRCR SERPLBLD CKD-EPI 2021: 105.1 ML/MIN/1.73
EOSINOPHIL # BLD AUTO: 0.1 10*3/MM3 (ref 0–0.4)
EOSINOPHIL NFR BLD AUTO: 2 % (ref 0.3–6.2)
ERYTHROCYTE [DISTWIDTH] IN BLOOD BY AUTOMATED COUNT: 12.8 % (ref 12.3–15.4)
ETHANOL BLD-MCNC: <10 MG/DL (ref 0–10)
ETHANOL UR QL: <0.01 %
GEN 5 2HR TROPONIN T REFLEX: <6 NG/L
GLOBULIN UR ELPH-MCNC: 3.3 GM/DL
GLUCOSE SERPL-MCNC: 107 MG/DL (ref 65–99)
GLUCOSE UR STRIP-MCNC: NEGATIVE MG/DL
HCT VFR BLD AUTO: 41.7 % (ref 37.5–51)
HGB BLD-MCNC: 14.4 G/DL (ref 13–17.7)
HGB UR QL STRIP.AUTO: NEGATIVE
HOLD SPECIMEN: NORMAL
HOLD SPECIMEN: NORMAL
HYALINE CASTS UR QL AUTO: ABNORMAL /LPF
IMM GRANULOCYTES # BLD AUTO: 0.01 10*3/MM3 (ref 0–0.05)
IMM GRANULOCYTES NFR BLD AUTO: 0.2 % (ref 0–0.5)
INR PPP: 0.97 (ref 0.9–1.1)
KETONES UR QL STRIP: ABNORMAL
LEUKOCYTE ESTERASE UR QL STRIP.AUTO: ABNORMAL
LIPASE SERPL-CCNC: 42 U/L (ref 13–60)
LYMPHOCYTES # BLD AUTO: 1.53 10*3/MM3 (ref 0.7–3.1)
LYMPHOCYTES NFR BLD AUTO: 30.4 % (ref 19.6–45.3)
MAGNESIUM SERPL-MCNC: 1.4 MG/DL (ref 1.6–2.6)
MCH RBC QN AUTO: 31.5 PG (ref 26.6–33)
MCHC RBC AUTO-ENTMCNC: 34.5 G/DL (ref 31.5–35.7)
MCV RBC AUTO: 91.2 FL (ref 79–97)
MONOCYTES # BLD AUTO: 0.49 10*3/MM3 (ref 0.1–0.9)
MONOCYTES NFR BLD AUTO: 9.7 % (ref 5–12)
MUCOUS THREADS URNS QL MICRO: ABNORMAL /HPF
NEUTROPHILS NFR BLD AUTO: 2.86 10*3/MM3 (ref 1.7–7)
NEUTROPHILS NFR BLD AUTO: 56.9 % (ref 42.7–76)
NITRITE UR QL STRIP: POSITIVE
NRBC BLD AUTO-RTO: 0 /100 WBC (ref 0–0.2)
PH UR STRIP.AUTO: <=5 [PH] (ref 5–8)
PLATELET # BLD AUTO: 142 10*3/MM3 (ref 140–450)
PLATELET # BLD AUTO: 161 10*3/MM3 (ref 140–450)
PMV BLD AUTO: 10.2 FL (ref 6–12)
POTASSIUM SERPL-SCNC: 3.2 MMOL/L (ref 3.5–5.2)
PROT SERPL-MCNC: 6.9 G/DL (ref 6–8.5)
PROT UR QL STRIP: ABNORMAL
PROTHROMBIN TIME: 13.4 SECONDS (ref 12.1–14.7)
QT INTERVAL: 372 MS
QTC INTERVAL: 437 MS
RBC # BLD AUTO: 4.57 10*6/MM3 (ref 4.14–5.8)
RBC # UR STRIP: ABNORMAL /HPF
REF LAB TEST METHOD: ABNORMAL
SODIUM SERPL-SCNC: 142 MMOL/L (ref 136–145)
SP GR UR STRIP: 1.03 (ref 1–1.03)
SQUAMOUS #/AREA URNS HPF: ABNORMAL /HPF
TROPONIN T DELTA: NORMAL
TROPONIN T SERPL HS-MCNC: <6 NG/L
UROBILINOGEN UR QL STRIP: ABNORMAL
WBC # UR STRIP: ABNORMAL /HPF
WBC NRBC COR # BLD AUTO: 5.03 10*3/MM3 (ref 3.4–10.8)
WHOLE BLOOD HOLD COAG: NORMAL
WHOLE BLOOD HOLD SPECIMEN: NORMAL

## 2023-12-14 PROCEDURE — 25010000002 ONDANSETRON PER 1 MG: Performed by: NURSE PRACTITIONER

## 2023-12-14 PROCEDURE — 99284 EMERGENCY DEPT VISIT MOD MDM: CPT

## 2023-12-14 PROCEDURE — 25010000002 MORPHINE PER 10 MG: Performed by: NURSE PRACTITIONER

## 2023-12-14 PROCEDURE — 93005 ELECTROCARDIOGRAM TRACING: CPT | Performed by: NURSE PRACTITIONER

## 2023-12-14 PROCEDURE — 80053 COMPREHEN METABOLIC PANEL: CPT | Performed by: NURSE PRACTITIONER

## 2023-12-14 PROCEDURE — 87086 URINE CULTURE/COLONY COUNT: CPT | Performed by: NURSE PRACTITIONER

## 2023-12-14 PROCEDURE — 85610 PROTHROMBIN TIME: CPT | Performed by: RADIOLOGY

## 2023-12-14 PROCEDURE — 96376 TX/PRO/DX INJ SAME DRUG ADON: CPT

## 2023-12-14 PROCEDURE — 25810000003 SODIUM CHLORIDE 0.9 % SOLUTION: Performed by: NURSE PRACTITIONER

## 2023-12-14 PROCEDURE — 71045 X-RAY EXAM CHEST 1 VIEW: CPT

## 2023-12-14 PROCEDURE — 36415 COLL VENOUS BLD VENIPUNCTURE: CPT

## 2023-12-14 PROCEDURE — 82077 ASSAY SPEC XCP UR&BREATH IA: CPT | Performed by: NURSE PRACTITIONER

## 2023-12-14 PROCEDURE — 76942 ECHO GUIDE FOR BIOPSY: CPT

## 2023-12-14 PROCEDURE — 83690 ASSAY OF LIPASE: CPT | Performed by: NURSE PRACTITIONER

## 2023-12-14 PROCEDURE — 25010000002 CEFTRIAXONE PER 250 MG: Performed by: NURSE PRACTITIONER

## 2023-12-14 PROCEDURE — 74176 CT ABD & PELVIS W/O CONTRAST: CPT

## 2023-12-14 PROCEDURE — 81001 URINALYSIS AUTO W/SCOPE: CPT | Performed by: NURSE PRACTITIONER

## 2023-12-14 PROCEDURE — 96365 THER/PROPH/DIAG IV INF INIT: CPT

## 2023-12-14 PROCEDURE — 83735 ASSAY OF MAGNESIUM: CPT | Performed by: NURSE PRACTITIONER

## 2023-12-14 PROCEDURE — 85049 AUTOMATED PLATELET COUNT: CPT | Performed by: RADIOLOGY

## 2023-12-14 PROCEDURE — 84484 ASSAY OF TROPONIN QUANT: CPT | Performed by: NURSE PRACTITIONER

## 2023-12-14 PROCEDURE — 85025 COMPLETE CBC W/AUTO DIFF WBC: CPT | Performed by: NURSE PRACTITIONER

## 2023-12-14 PROCEDURE — 96375 TX/PRO/DX INJ NEW DRUG ADDON: CPT

## 2023-12-14 RX ORDER — ASPIRIN 81 MG/1
324 TABLET, CHEWABLE ORAL ONCE
Status: COMPLETED | OUTPATIENT
Start: 2023-12-14 | End: 2023-12-14

## 2023-12-14 RX ORDER — CEFDINIR 300 MG/1
300 CAPSULE ORAL 2 TIMES DAILY
Qty: 14 CAPSULE | Refills: 0 | Status: SHIPPED | OUTPATIENT
Start: 2023-12-14 | End: 2023-12-21

## 2023-12-14 RX ORDER — ONDANSETRON 4 MG/1
4 TABLET, ORALLY DISINTEGRATING ORAL ONCE
Status: DISCONTINUED | OUTPATIENT
Start: 2023-12-14 | End: 2023-12-14

## 2023-12-14 RX ORDER — ONDANSETRON 4 MG/1
4 TABLET, ORALLY DISINTEGRATING ORAL EVERY 6 HOURS PRN
Qty: 12 TABLET | Refills: 0 | Status: SHIPPED | OUTPATIENT
Start: 2023-12-14

## 2023-12-14 RX ORDER — SODIUM CHLORIDE 0.9 % (FLUSH) 0.9 %
10 SYRINGE (ML) INJECTION AS NEEDED
Status: DISCONTINUED | OUTPATIENT
Start: 2023-12-14 | End: 2023-12-14 | Stop reason: HOSPADM

## 2023-12-14 RX ORDER — ONDANSETRON 2 MG/ML
4 INJECTION INTRAMUSCULAR; INTRAVENOUS ONCE
Status: COMPLETED | OUTPATIENT
Start: 2023-12-14 | End: 2023-12-14

## 2023-12-14 RX ADMIN — MORPHINE SULFATE 4 MG: 4 INJECTION, SOLUTION INTRAMUSCULAR; INTRAVENOUS at 10:54

## 2023-12-14 RX ADMIN — SODIUM CHLORIDE 1000 ML: 9 INJECTION, SOLUTION INTRAVENOUS at 16:58

## 2023-12-14 RX ADMIN — MORPHINE SULFATE 4 MG: 4 INJECTION, SOLUTION INTRAMUSCULAR; INTRAVENOUS at 16:50

## 2023-12-14 RX ADMIN — CEFTRIAXONE 1000 MG: 1 INJECTION, POWDER, FOR SOLUTION INTRAMUSCULAR; INTRAVENOUS at 16:59

## 2023-12-14 RX ADMIN — ASPIRIN 324 MG: 81 TABLET, CHEWABLE ORAL at 10:54

## 2023-12-14 RX ADMIN — ONDANSETRON 4 MG: 2 INJECTION INTRAMUSCULAR; INTRAVENOUS at 10:54

## 2023-12-14 NOTE — ED PROVIDER NOTES
Subjective   History of Present Illness  Patient is a 48-year-old male who presents emergency room complaining of vomiting and some chest discomfort.  Patient reports that he recently had a liver biopsy.  Patient reports a history of alcoholism.  Patient reports history of cirrhosis.  Reports he had a biopsy to rule out presenting for primary care provider.  Patient denies shortness of breath.  Patient denies chest pain currently.  Patient reports pain in the upper abdomen patient denies any fever.  Patient denies diarrhea.  Patient denies any alleviating worsening factors.  Patient is trying interventions.    Chest Pain      Review of Systems   Constitutional: Negative.    HENT: Negative.     Eyes: Negative.    Respiratory: Negative.     Cardiovascular:  Positive for chest pain.   Gastrointestinal: Negative.    Endocrine: Negative.    Genitourinary: Negative.    Musculoskeletal: Negative.    Skin: Negative.    Allergic/Immunologic: Negative.    Neurological: Negative.    Hematological: Negative.    Psychiatric/Behavioral: Negative.         Past Medical History:   Diagnosis Date    GERD (gastroesophageal reflux disease)     Seizures     last one was a month ago       No Known Allergies    Past Surgical History:   Procedure Laterality Date    ENDOSCOPY N/A 11/1/2023    Procedure: ESOPHAGOGASTRODUODENOSCOPY WITH ANESTHESIA;  Surgeon: Torin Rice MD;  Location: [facility];  Service: Gastroenterology;  Laterality: N/A;    HAND SURGERY         Family History   Problem Relation Age of Onset    No Known Problems Mother     No Known Problems Father        Social History     Socioeconomic History    Marital status:    Tobacco Use    Smoking status: Never    Smokeless tobacco: Current     Types: Snuff   Vaping Use    Vaping Use: Never used   Substance and Sexual Activity    Alcohol use: Yes     Alcohol/week: 10.0 standard drinks of alcohol     Types: 10 Cans of beer per week     Comment: 1 pint daily    Drug  use: Never    Sexual activity: Defer           Objective   Physical Exam  Vitals and nursing note reviewed.   Constitutional:       Appearance: He is well-developed.   HENT:      Head: Normocephalic.      Right Ear: External ear normal.      Left Ear: External ear normal.   Eyes:      Conjunctiva/sclera: Conjunctivae normal.      Pupils: Pupils are equal, round, and reactive to light.   Cardiovascular:      Rate and Rhythm: Normal rate and regular rhythm.      Heart sounds: Normal heart sounds.   Pulmonary:      Effort: Pulmonary effort is normal.      Breath sounds: Normal breath sounds.   Abdominal:      General: Bowel sounds are normal.      Palpations: Abdomen is soft.   Musculoskeletal:         General: Normal range of motion.      Cervical back: Normal range of motion and neck supple.   Skin:     General: Skin is warm and dry.      Capillary Refill: Capillary refill takes less than 2 seconds.   Neurological:      Mental Status: He is alert and oriented to person, place, and time.   Psychiatric:         Behavior: Behavior normal.         Thought Content: Thought content normal.         Procedures           ED Course  ED Course as of 12/18/23 2157   Thu Dec 14, 2023   1007 EKG obtained in triage and independently interpreted as normal sinus rhythm without acute ischemic findings or arrhythmias. [AS]      ED Course User Index  [AS] Arnulfo Adair MD                                             Medical Decision Making  Problems Addressed:  Acute UTI: complicated acute illness or injury  Post-op pain: complicated acute illness or injury    Amount and/or Complexity of Data Reviewed  Labs: ordered.  Radiology: ordered.  ECG/medicine tests: ordered.    Risk  OTC drugs.  Prescription drug management.        Final diagnoses:   Acute UTI   Post-op pain       ED Disposition  ED Disposition       ED Disposition   Discharge    Condition   Stable    Comment   --               Tereso Dominguez, APRN  [address]  [place] [id number] [phone]    Schedule an appointment as soon as possible for a visit   For further evaluation         Medication List        New Prescriptions      cefdinir 300 MG capsule  Commonly known as: OMNICEF  Take 1 capsule by mouth 2 (Two) Times a Day for 7 days.            Changed      * ondansetron ODT 4 MG disintegrating tablet  Commonly known as: ZOFRAN-ODT  What changed: Another medication with the same name was added. Make sure you understand how and when to take each.     * ondansetron ODT 4 MG disintegrating tablet  Commonly known as: ZOFRAN-ODT  Place 1 tablet on the tongue Every 6 (Six) Hours As Needed for Vomiting or Nausea.  What changed: You were already taking a medication with the same name, and this prescription was added. Make sure you understand how and when to take each.           * This list has 2 medication(s) that are the same as other medications prescribed for you. Read the directions carefully, and ask your doctor or other care provider to review them with you.                   Where to Get Your Medications        These medications were sent to [facility] - [place], [place] - [address] [place] [address] - [phone] [facility] [phone] [address] [address]      Phone: [phone]   ondansetron ODT 4 MG disintegrating tablet       You can get these medications from any pharmacy    Bring a paper prescription for each of these medications  cefdinir 300 MG capsule            Emmanuel Mitchell P, APRN  12/18/23 9068

## 2023-12-14 NOTE — NURSING NOTE
Procedure complete and patient tolerated well. Dressing applied. Will continue to monitor post procedure.

## 2023-12-16 LAB — BACTERIA SPEC AEROBE CULT: NO GROWTH

## 2023-12-19 LAB — REF LAB TEST METHOD: NORMAL

## 2023-12-20 ENCOUNTER — TELEPHONE (OUTPATIENT)
Dept: SURGERY | Facility: CLINIC | Age: 48
End: 2023-12-20
Payer: COMMERCIAL

## 2023-12-20 NOTE — TELEPHONE ENCOUNTER
Talked with patients friend who is on his HIPAA , advised that patient needs GI visit, she will call back with which doctor he chooses and I will get patient appointment.   Gracie voiced understanding.

## 2023-12-20 NOTE — PROGRESS NOTES
Patient updated on path report.  He has seen Dr. Diaz, gastroenterologist in the past.  He would like to begin in regards to findings of early cirrhosis.My medical assistant will call and make an appointment and then update patient on appointment date and time.  Verbalized understanding.

## 2025-04-20 ENCOUNTER — HOSPITAL ENCOUNTER (INPATIENT)
Facility: HOSPITAL | Age: 50
LOS: 6 days | Discharge: LEFT BEFORE DISCHARGE | End: 2025-04-26
Attending: INTERNAL MEDICINE | Admitting: INTERNAL MEDICINE
Payer: COMMERCIAL

## 2025-04-21 ENCOUNTER — APPOINTMENT (OUTPATIENT)
Dept: ULTRASOUND IMAGING | Facility: HOSPITAL | Age: 50
End: 2025-04-21
Payer: COMMERCIAL

## 2025-04-21 ENCOUNTER — APPOINTMENT (OUTPATIENT)
Dept: RESPIRATORY THERAPY | Facility: HOSPITAL | Age: 50
End: 2025-04-21
Payer: COMMERCIAL

## 2025-04-21 PROBLEM — F10.930: Status: ACTIVE | Noted: 2025-04-21

## 2025-04-21 PROBLEM — F52.21 ED (ERECTILE DYSFUNCTION) OF NON-ORGANIC ORIGIN: Status: ACTIVE | Noted: 2025-04-21

## 2025-04-21 PROBLEM — B37.42 CANDIDAL BALANITIS: Status: ACTIVE | Noted: 2025-04-21

## 2025-04-21 PROBLEM — R10.9 RIGHT SIDED ABDOMINAL PAIN: Status: RESOLVED | Noted: 2023-10-03 | Resolved: 2025-04-21

## 2025-04-21 PROBLEM — K31.84 GASTROPARESIS: Status: ACTIVE | Noted: 2025-04-21

## 2025-04-21 PROBLEM — E78.1 HYPERTRIGLYCERIDEMIA: Status: ACTIVE | Noted: 2025-04-21

## 2025-04-21 PROBLEM — M70.41 BURSITIS, PREPATELLAR, RIGHT: Status: ACTIVE | Noted: 2025-04-21

## 2025-04-21 PROBLEM — F10.931 ALCOHOL WITHDRAWAL DELIRIUM: Status: ACTIVE | Noted: 2025-04-21

## 2025-04-21 PROBLEM — K86.3 PANCREATIC PSEUDOCYST/CYST: Status: ACTIVE | Noted: 2025-04-21

## 2025-04-21 PROBLEM — M10.9 GOUTY ARTHRITIS OF RIGHT GREAT TOE: Status: ACTIVE | Noted: 2025-04-21

## 2025-04-21 PROBLEM — R10.11 RUQ ABDOMINAL PAIN: Status: RESOLVED | Noted: 2023-10-03 | Resolved: 2025-04-21

## 2025-04-21 PROBLEM — K85.90 ACUTE RECURRENT PANCREATITIS: Status: ACTIVE | Noted: 2025-04-21

## 2025-04-21 PROBLEM — M10.9 GOUT ATTACK: Status: ACTIVE | Noted: 2025-04-21

## 2025-04-21 PROBLEM — K21.9 GERD (GASTROESOPHAGEAL REFLUX DISEASE): Status: ACTIVE | Noted: 2025-04-21

## 2025-04-21 PROBLEM — K70.10 ALCOHOLIC HEPATITIS WITHOUT ASCITES: Status: ACTIVE | Noted: 2025-04-21

## 2025-04-21 PROBLEM — R56.9: Status: ACTIVE | Noted: 2025-04-21

## 2025-04-21 PROBLEM — K29.20 ALCOHOLIC GASTRITIS: Status: ACTIVE | Noted: 2025-04-21
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
## 2025-04-21 PROBLEM — U07.1 LAB TEST POSITIVE FOR DETECTION OF COVID-19 VIRUS: Status: ACTIVE | Noted: 2025-04-21

## 2025-04-21 PROBLEM — K86.2 PANCREATIC PSEUDOCYST/CYST: Status: ACTIVE | Noted: 2025-04-21

## 2025-04-21 LAB
ALBUMIN SERPL-MCNC: 3.9 G/DL (ref 3.5–5.2)
ALBUMIN SERPL-MCNC: 4 G/DL (ref 3.5–5.2)
ALBUMIN/GLOB SERPL: 1.3 G/DL
ALBUMIN/GLOB SERPL: 1.3 G/DL
ALP SERPL-CCNC: 165 U/L (ref 39–117)
ALP SERPL-CCNC: 168 U/L (ref 39–117)
ALT SERPL W P-5'-P-CCNC: 43 U/L (ref 1–41)
ALT SERPL W P-5'-P-CCNC: 44 U/L (ref 1–41)
AMMONIA BLD-SCNC: 57 UMOL/L (ref 16–60)
ANION GAP SERPL CALCULATED.3IONS-SCNC: 12.9 MMOL/L (ref 5–15)
ANION GAP SERPL CALCULATED.3IONS-SCNC: 14.6 MMOL/L (ref 5–15)
APAP SERPL-MCNC: <5 MCG/ML (ref 0–30)
APTT PPP: 22 SECONDS (ref 24.5–35.9)
AST SERPL-CCNC: 100 U/L (ref 1–40)
AST SERPL-CCNC: 112 U/L (ref 1–40)
BASOPHILS # BLD AUTO: 0.08 10*3/MM3 (ref 0–0.2)
BASOPHILS NFR BLD AUTO: 2 % (ref 0–1.5)
BILIRUB SERPL-MCNC: 2.6 MG/DL (ref 0–1.2)
BILIRUB SERPL-MCNC: 2.7 MG/DL (ref 0–1.2)
BUN SERPL-MCNC: 7 MG/DL (ref 6–20)
BUN SERPL-MCNC: 7 MG/DL (ref 6–20)
BUN/CREAT SERPL: 10.3 (ref 7–25)
BUN/CREAT SERPL: 10.4 (ref 7–25)
CALCIUM SPEC-SCNC: 8.9 MG/DL (ref 8.6–10.5)
CALCIUM SPEC-SCNC: 9 MG/DL (ref 8.6–10.5)
CHLORIDE SERPL-SCNC: 97 MMOL/L (ref 98–107)
CHLORIDE SERPL-SCNC: 98 MMOL/L (ref 98–107)
CO2 SERPL-SCNC: 23.4 MMOL/L (ref 22–29)
CO2 SERPL-SCNC: 24.1 MMOL/L (ref 22–29)
CREAT SERPL-MCNC: 0.67 MG/DL (ref 0.76–1.27)
CREAT SERPL-MCNC: 0.68 MG/DL (ref 0.76–1.27)
D-LACTATE SERPL-SCNC: 0.8 MMOL/L (ref 0.5–2)
DEPRECATED RDW RBC AUTO: 56.4 FL (ref 37–54)
EGFRCR SERPLBLD CKD-EPI 2021: 112.5 ML/MIN/1.73
EGFRCR SERPLBLD CKD-EPI 2021: 113 ML/MIN/1.73
EOSINOPHIL # BLD AUTO: 0.08 10*3/MM3 (ref 0–0.4)
EOSINOPHIL NFR BLD AUTO: 2 % (ref 0.3–6.2)
ERYTHROCYTE [DISTWIDTH] IN BLOOD BY AUTOMATED COUNT: 15.6 % (ref 12.3–15.4)
GLOBULIN UR ELPH-MCNC: 3.1 GM/DL
GLOBULIN UR ELPH-MCNC: 3.1 GM/DL
GLUCOSE SERPL-MCNC: 85 MG/DL (ref 65–99)
GLUCOSE SERPL-MCNC: 87 MG/DL (ref 65–99)
HBA1C MFR BLD: 5.2 % (ref 4.8–5.6)
HCT VFR BLD AUTO: 39.7 % (ref 37.5–51)
HGB BLD-MCNC: 13.5 G/DL (ref 13–17.7)
IMM GRANULOCYTES # BLD AUTO: 0.01 10*3/MM3 (ref 0–0.05)
IMM GRANULOCYTES NFR BLD AUTO: 0.3 % (ref 0–0.5)
INR PPP: 1.06 (ref 0.9–1.1)
LYMPHOCYTES # BLD AUTO: 1.07 10*3/MM3 (ref 0.7–3.1)
LYMPHOCYTES NFR BLD AUTO: 26.9 % (ref 19.6–45.3)
MAGNESIUM SERPL-MCNC: 1.6 MG/DL (ref 1.6–2.6)
MAGNESIUM SERPL-MCNC: 2.5 MG/DL (ref 1.6–2.6)
MCH RBC QN AUTO: 33 PG (ref 26.6–33)
MCHC RBC AUTO-ENTMCNC: 34 G/DL (ref 31.5–35.7)
MCV RBC AUTO: 97.1 FL (ref 79–97)
MONOCYTES # BLD AUTO: 0.66 10*3/MM3 (ref 0.1–0.9)
MONOCYTES NFR BLD AUTO: 16.6 % (ref 5–12)
NEUTROPHILS NFR BLD AUTO: 2.08 10*3/MM3 (ref 1.7–7)
NEUTROPHILS NFR BLD AUTO: 52.2 % (ref 42.7–76)
NRBC BLD AUTO-RTO: 0 /100 WBC (ref 0–0.2)
PHOSPHATE SERPL-MCNC: 2.6 MG/DL (ref 2.5–4.5)
PLATELET # BLD AUTO: 110 10*3/MM3 (ref 140–450)
PMV BLD AUTO: 10.6 FL (ref 6–12)
POTASSIUM SERPL-SCNC: 3.6 MMOL/L (ref 3.5–5.2)
POTASSIUM SERPL-SCNC: 3.6 MMOL/L (ref 3.5–5.2)
POTASSIUM SERPL-SCNC: 4.1 MMOL/L (ref 3.5–5.2)
PROT SERPL-MCNC: 7 G/DL (ref 6–8.5)
PROT SERPL-MCNC: 7.1 G/DL (ref 6–8.5)
PROTHROMBIN TIME: 14 SECONDS (ref 11.6–15.1)
QT INTERVAL: 386 MS
QTC INTERVAL: 461 MS
RBC # BLD AUTO: 4.09 10*6/MM3 (ref 4.14–5.8)
SALICYLATES SERPL-MCNC: <0.3 MG/DL
SODIUM SERPL-SCNC: 135 MMOL/L (ref 136–145)
SODIUM SERPL-SCNC: 135 MMOL/L (ref 136–145)
TSH SERPL DL<=0.05 MIU/L-ACNC: 1.68 UIU/ML (ref 0.27–4.2)
WBC NRBC COR # BLD AUTO: 3.98 10*3/MM3 (ref 3.4–10.8)

## 2025-04-21 PROCEDURE — 76700 US EXAM ABDOM COMPLETE: CPT

## 2025-04-21 PROCEDURE — 85730 THROMBOPLASTIN TIME PARTIAL: CPT | Performed by: INTERNAL MEDICINE

## 2025-04-21 PROCEDURE — 82140 ASSAY OF AMMONIA: CPT | Performed by: INTERNAL MEDICINE

## 2025-04-21 PROCEDURE — 25010000002 THIAMINE HCL 200 MG/2ML SOLUTION 2 ML VIAL: Performed by: INTERNAL MEDICINE

## 2025-04-21 PROCEDURE — 84100 ASSAY OF PHOSPHORUS: CPT | Performed by: INTERNAL MEDICINE

## 2025-04-21 PROCEDURE — 99223 1ST HOSP IP/OBS HIGH 75: CPT | Performed by: INTERNAL MEDICINE

## 2025-04-21 PROCEDURE — 83735 ASSAY OF MAGNESIUM: CPT | Performed by: INTERNAL MEDICINE

## 2025-04-21 PROCEDURE — 95819 EEG AWAKE AND ASLEEP: CPT

## 2025-04-21 PROCEDURE — 25010000002 ENOXAPARIN PER 10 MG: Performed by: INTERNAL MEDICINE

## 2025-04-21 PROCEDURE — 80143 DRUG ASSAY ACETAMINOPHEN: CPT | Performed by: INTERNAL MEDICINE

## 2025-04-21 PROCEDURE — 93005 ELECTROCARDIOGRAM TRACING: CPT | Performed by: INTERNAL MEDICINE

## 2025-04-21 PROCEDURE — 25010000002 FOLIC ACID 5 MG/ML SOLUTION 10 ML VIAL: Performed by: INTERNAL MEDICINE

## 2025-04-21 PROCEDURE — 84132 ASSAY OF SERUM POTASSIUM: CPT | Performed by: INTERNAL MEDICINE

## 2025-04-21 PROCEDURE — 83605 ASSAY OF LACTIC ACID: CPT | Performed by: INTERNAL MEDICINE

## 2025-04-21 PROCEDURE — 25010000002 THIAMINE PER 100 MG: Performed by: INTERNAL MEDICINE

## 2025-04-21 PROCEDURE — 95819 EEG AWAKE AND ASLEEP: CPT | Performed by: PSYCHIATRY & NEUROLOGY

## 2025-04-21 PROCEDURE — 80053 COMPREHEN METABOLIC PANEL: CPT | Performed by: INTERNAL MEDICINE

## 2025-04-21 PROCEDURE — 80050 GENERAL HEALTH PANEL: CPT | Performed by: INTERNAL MEDICINE

## 2025-04-21 PROCEDURE — 25010000002 MAGNESIUM SULFATE 4 GM/100ML SOLUTION: Performed by: INTERNAL MEDICINE

## 2025-04-21 PROCEDURE — 80179 DRUG ASSAY SALICYLATE: CPT | Performed by: INTERNAL MEDICINE

## 2025-04-21 PROCEDURE — 85610 PROTHROMBIN TIME: CPT | Performed by: INTERNAL MEDICINE

## 2025-04-21 PROCEDURE — 83036 HEMOGLOBIN GLYCOSYLATED A1C: CPT | Performed by: INTERNAL MEDICINE

## 2025-04-21 RX ORDER — NITROGLYCERIN 0.4 MG/1
0.4 TABLET SUBLINGUAL
Status: DISCONTINUED | OUTPATIENT
Start: 2025-04-21 | End: 2025-04-26 | Stop reason: HOSPADM

## 2025-04-21 RX ORDER — SODIUM CHLORIDE 0.9 % (FLUSH) 0.9 %
10 SYRINGE (ML) INJECTION EVERY 12 HOURS SCHEDULED
Status: DISCONTINUED | OUTPATIENT
Start: 2025-04-21 | End: 2025-04-26 | Stop reason: HOSPADM

## 2025-04-21 RX ORDER — GABAPENTIN 300 MG/1
600 CAPSULE ORAL NIGHTLY
Status: DISCONTINUED | OUTPATIENT
Start: 2025-04-21 | End: 2025-04-26 | Stop reason: HOSPADM

## 2025-04-21 RX ORDER — DIAZEPAM 5 MG/1
10 TABLET ORAL
Status: DISPENSED | OUTPATIENT
Start: 2025-04-21 | End: 2025-04-26

## 2025-04-21 RX ORDER — CHLORDIAZEPOXIDE HYDROCHLORIDE 25 MG/1
50 CAPSULE, GELATIN COATED ORAL EVERY 8 HOURS SCHEDULED
Status: DISCONTINUED | OUTPATIENT
Start: 2025-04-21 | End: 2025-04-25

## 2025-04-21 RX ORDER — DIAZEPAM 5 MG/1
15 TABLET ORAL
Status: ACTIVE | OUTPATIENT
Start: 2025-04-21 | End: 2025-04-26

## 2025-04-21 RX ORDER — CALCIUM CARBONATE 500 MG/1
2 TABLET, CHEWABLE ORAL 3 TIMES DAILY PRN
Status: DISCONTINUED | OUTPATIENT
Start: 2025-04-21 | End: 2025-04-26 | Stop reason: HOSPADM

## 2025-04-21 RX ORDER — POTASSIUM CHLORIDE 1.5 G/1.58G
40 POWDER, FOR SOLUTION ORAL EVERY 4 HOURS
Status: DISPENSED | OUTPATIENT
Start: 2025-04-21 | End: 2025-04-21

## 2025-04-21 RX ORDER — DIAZEPAM 10 MG/2ML
15 INJECTION, SOLUTION INTRAMUSCULAR; INTRAVENOUS
Status: DISPENSED | OUTPATIENT
Start: 2025-04-21 | End: 2025-04-26

## 2025-04-21 RX ORDER — LORAZEPAM 1 MG/1
1 TABLET ORAL
Status: DISCONTINUED | OUTPATIENT
Start: 2025-04-21 | End: 2025-04-21

## 2025-04-21 RX ORDER — HYDROCODONE BITARTRATE AND ACETAMINOPHEN 7.5; 325 MG/1; MG/1
1 TABLET ORAL EVERY 12 HOURS PRN
Status: CANCELLED | OUTPATIENT
Start: 2025-04-21

## 2025-04-21 RX ORDER — MIDAZOLAM HYDROCHLORIDE 1 MG/ML
2 INJECTION, SOLUTION INTRAMUSCULAR; INTRAVENOUS
Status: DISCONTINUED | OUTPATIENT
Start: 2025-04-21 | End: 2025-04-21

## 2025-04-21 RX ORDER — SILDENAFIL CITRATE 20 MG/1
20-100 TABLET ORAL DAILY PRN
COMMUNITY

## 2025-04-21 RX ORDER — LORAZEPAM 2 MG/1
2 TABLET ORAL
Status: DISCONTINUED | OUTPATIENT
Start: 2025-04-21 | End: 2025-04-21

## 2025-04-21 RX ORDER — MIDAZOLAM HYDROCHLORIDE 1 MG/ML
4 INJECTION, SOLUTION INTRAMUSCULAR; INTRAVENOUS
Status: DISCONTINUED | OUTPATIENT
Start: 2025-04-21 | End: 2025-04-21

## 2025-04-21 RX ORDER — GABAPENTIN 600 MG/1
600 TABLET ORAL NIGHTLY PRN
COMMUNITY

## 2025-04-21 RX ORDER — PROMETHAZINE HYDROCHLORIDE 25 MG/1
25 TABLET ORAL EVERY 12 HOURS PRN
Status: DISCONTINUED | OUTPATIENT
Start: 2025-04-21 | End: 2025-04-26 | Stop reason: HOSPADM

## 2025-04-21 RX ORDER — POTASSIUM CHLORIDE 1500 MG/1
40 TABLET, EXTENDED RELEASE ORAL EVERY 4 HOURS
Status: DISCONTINUED | OUTPATIENT
Start: 2025-04-21 | End: 2025-04-21

## 2025-04-21 RX ORDER — ENOXAPARIN SODIUM 100 MG/ML
40 INJECTION SUBCUTANEOUS EVERY 24 HOURS
Status: DISCONTINUED | OUTPATIENT
Start: 2025-04-21 | End: 2025-04-26 | Stop reason: HOSPADM

## 2025-04-21 RX ORDER — TRAZODONE HYDROCHLORIDE 50 MG/1
50-100 TABLET ORAL NIGHTLY
Status: ON HOLD | COMMUNITY
End: 2025-04-21

## 2025-04-21 RX ORDER — COLCHICINE 0.6 MG/1
0.6 TABLET ORAL DAILY
Status: ON HOLD | COMMUNITY
End: 2025-04-21

## 2025-04-21 RX ORDER — DIAZEPAM 10 MG/2ML
10 INJECTION, SOLUTION INTRAMUSCULAR; INTRAVENOUS
Status: DISPENSED | OUTPATIENT
Start: 2025-04-21 | End: 2025-04-26

## 2025-04-21 RX ORDER — CLOTRIMAZOLE AND BETAMETHASONE DIPROPIONATE 10; .64 MG/G; MG/G
1 CREAM TOPICAL 2 TIMES DAILY
COMMUNITY

## 2025-04-21 RX ORDER — TRIAMCINOLONE ACETONIDE 1 MG/G
1 CREAM TOPICAL 2 TIMES DAILY
COMMUNITY

## 2025-04-21 RX ORDER — ONDANSETRON 4 MG/1
4 TABLET, ORALLY DISINTEGRATING ORAL EVERY 6 HOURS PRN
Status: DISCONTINUED | OUTPATIENT
Start: 2025-04-21 | End: 2025-04-26 | Stop reason: HOSPADM

## 2025-04-21 RX ORDER — DIAZEPAM 10 MG/2ML
20 INJECTION, SOLUTION INTRAMUSCULAR; INTRAVENOUS
Status: ACTIVE | OUTPATIENT
Start: 2025-04-21 | End: 2025-04-26

## 2025-04-21 RX ORDER — DIAZEPAM 5 MG/1
20 TABLET ORAL
Status: ACTIVE | OUTPATIENT
Start: 2025-04-21 | End: 2025-04-26

## 2025-04-21 RX ORDER — BISACODYL 5 MG/1
5 TABLET, DELAYED RELEASE ORAL DAILY PRN
Status: DISCONTINUED | OUTPATIENT
Start: 2025-04-21 | End: 2025-04-26 | Stop reason: HOSPADM

## 2025-04-21 RX ORDER — THIAMINE HYDROCHLORIDE 100 MG/ML
200 INJECTION, SOLUTION INTRAMUSCULAR; INTRAVENOUS EVERY 8 HOURS SCHEDULED
Status: DISCONTINUED | OUTPATIENT
Start: 2025-04-24 | End: 2025-04-26 | Stop reason: HOSPADM

## 2025-04-21 RX ORDER — AMOXICILLIN 250 MG
2 CAPSULE ORAL 2 TIMES DAILY
Status: DISCONTINUED | OUTPATIENT
Start: 2025-04-21 | End: 2025-04-26 | Stop reason: HOSPADM

## 2025-04-21 RX ORDER — MAGNESIUM SULFATE HEPTAHYDRATE 40 MG/ML
4 INJECTION, SOLUTION INTRAVENOUS ONCE
Status: COMPLETED | OUTPATIENT
Start: 2025-04-21 | End: 2025-04-21

## 2025-04-21 RX ORDER — MULTIPLE VITAMINS W/ MINERALS TAB 9MG-400MCG
1 TAB ORAL DAILY
Status: DISCONTINUED | OUTPATIENT
Start: 2025-04-21 | End: 2025-04-26 | Stop reason: HOSPADM

## 2025-04-21 RX ORDER — PROMETHAZINE HYDROCHLORIDE 25 MG/1
25 TABLET ORAL EVERY 12 HOURS PRN
COMMUNITY

## 2025-04-21 RX ORDER — ONDANSETRON 4 MG/1
4 TABLET, FILM COATED ORAL EVERY 6 HOURS PRN
COMMUNITY

## 2025-04-21 RX ORDER — POLYETHYLENE GLYCOL 3350 17 G/17G
17 POWDER, FOR SOLUTION ORAL DAILY PRN
Status: DISCONTINUED | OUTPATIENT
Start: 2025-04-21 | End: 2025-04-26 | Stop reason: HOSPADM

## 2025-04-21 RX ORDER — LORAZEPAM 2 MG/1
2 TABLET ORAL EVERY 12 HOURS PRN
Status: CANCELLED | OUTPATIENT
Start: 2025-04-21

## 2025-04-21 RX ORDER — SODIUM CHLORIDE 9 MG/ML
40 INJECTION, SOLUTION INTRAVENOUS AS NEEDED
Status: DISCONTINUED | OUTPATIENT
Start: 2025-04-21 | End: 2025-04-26 | Stop reason: HOSPADM

## 2025-04-21 RX ORDER — SODIUM CHLORIDE 0.9 % (FLUSH) 0.9 %
10 SYRINGE (ML) INJECTION AS NEEDED
Status: DISCONTINUED | OUTPATIENT
Start: 2025-04-21 | End: 2025-04-26 | Stop reason: HOSPADM

## 2025-04-21 RX ORDER — BISACODYL 10 MG
10 SUPPOSITORY, RECTAL RECTAL DAILY PRN
Status: DISCONTINUED | OUTPATIENT
Start: 2025-04-21 | End: 2025-04-26 | Stop reason: HOSPADM

## 2025-04-21 RX ORDER — DIPHENOXYLATE HYDROCHLORIDE AND ATROPINE SULFATE 2.5; .025 MG/1; MG/1
1 TABLET ORAL DAILY
Status: DISCONTINUED | OUTPATIENT
Start: 2025-04-21 | End: 2025-04-26 | Stop reason: HOSPADM

## 2025-04-21 RX ORDER — HYDROCODONE BITARTRATE AND ACETAMINOPHEN 7.5; 325 MG/1; MG/1
1 TABLET ORAL EVERY 12 HOURS PRN
Refills: 0 | Status: DISCONTINUED | OUTPATIENT
Start: 2025-04-21 | End: 2025-04-26 | Stop reason: HOSPADM

## 2025-04-21 RX ORDER — PANTOPRAZOLE SODIUM 40 MG/1
40 TABLET, DELAYED RELEASE ORAL
Status: DISCONTINUED | OUTPATIENT
Start: 2025-04-21 | End: 2025-04-25 | Stop reason: SDUPTHER

## 2025-04-21 RX ORDER — TRIAMCINOLONE ACETONIDE 1 MG/G
1 CREAM TOPICAL 2 TIMES DAILY
Status: DISCONTINUED | OUTPATIENT
Start: 2025-04-21 | End: 2025-04-26 | Stop reason: HOSPADM

## 2025-04-21 RX ADMIN — MULTIVITAMIN TABLET 1 TABLET: TABLET at 17:01

## 2025-04-21 RX ADMIN — HYDROCODONE BITARTRATE AND ACETAMINOPHEN 1 TABLET: 7.5; 325 TABLET ORAL at 17:01

## 2025-04-21 RX ADMIN — CALCIUM CARBONATE 2 TABLET: 500 TABLET, CHEWABLE ORAL at 04:05

## 2025-04-21 RX ADMIN — LORAZEPAM 2 MG: 2 TABLET ORAL at 04:12

## 2025-04-21 RX ADMIN — DIAZEPAM 10 MG: 5 TABLET ORAL at 20:18

## 2025-04-21 RX ADMIN — CHLORDIAZEPOXIDE HYDROCHLORIDE 50 MG: 25 CAPSULE ORAL at 21:31

## 2025-04-21 RX ADMIN — TRIAMCINOLONE ACETONIDE 1 APPLICATION: 1 CREAM TOPICAL at 20:18

## 2025-04-21 RX ADMIN — THIAMINE HYDROCHLORIDE 500 MG: 100 INJECTION, SOLUTION INTRAMUSCULAR; INTRAVENOUS at 06:35

## 2025-04-21 RX ADMIN — FOLIC ACID 1 MG: 5 INJECTION, SOLUTION INTRAMUSCULAR; INTRAVENOUS; SUBCUTANEOUS at 08:38

## 2025-04-21 RX ADMIN — THIAMINE HYDROCHLORIDE 500 MG: 100 INJECTION, SOLUTION INTRAMUSCULAR; INTRAVENOUS at 14:54

## 2025-04-21 RX ADMIN — Medication 1 TABLET: at 08:38

## 2025-04-21 RX ADMIN — LORAZEPAM 2 MG: 2 TABLET ORAL at 08:38

## 2025-04-21 RX ADMIN — MUPIROCIN 1 APPLICATION: 20 OINTMENT TOPICAL at 09:10

## 2025-04-21 RX ADMIN — Medication 10 ML: at 20:18

## 2025-04-21 RX ADMIN — CHLORDIAZEPOXIDE HYDROCHLORIDE 50 MG: 25 CAPSULE ORAL at 14:43

## 2025-04-21 RX ADMIN — Medication 10 ML: at 09:10

## 2025-04-21 RX ADMIN — MAGNESIUM SULFATE HEPTAHYDRATE 4 G: 40 INJECTION, SOLUTION INTRAVENOUS at 03:54

## 2025-04-21 RX ADMIN — POTASSIUM CHLORIDE 40 MEQ: 1500 TABLET, EXTENDED RELEASE ORAL at 03:54

## 2025-04-21 RX ADMIN — MUPIROCIN 1 APPLICATION: 20 OINTMENT TOPICAL at 20:18

## 2025-04-21 RX ADMIN — THIAMINE HYDROCHLORIDE 500 MG: 100 INJECTION, SOLUTION INTRAMUSCULAR; INTRAVENOUS at 21:32

## 2025-04-21 RX ADMIN — ENOXAPARIN SODIUM 40 MG: 40 INJECTION SUBCUTANEOUS at 17:01

## 2025-04-21 RX ADMIN — GABAPENTIN 600 MG: 300 CAPSULE ORAL at 20:18

## 2025-04-21 RX ADMIN — Medication 10 ML: at 01:42

## 2025-04-21 RX ADMIN — POTASSIUM CHLORIDE 40 MEQ: 1.5 POWDER, FOR SOLUTION ORAL at 06:35

## 2025-04-21 RX ADMIN — PANTOPRAZOLE SODIUM 40 MG: 40 TABLET, DELAYED RELEASE ORAL at 14:43

## 2025-04-21 NOTE — CASE MANAGEMENT/SOCIAL WORK
Discharge Planning Assessment   [place]     Patient Name: Ruperto Maxwell  MRN: [id number]  Today's Date: 4/21/2025    Admit Date: 4/20/2025    Plan: SS received CM consult for D/C planning. SS spoke with pt at bedside on this date. Pt lives with parents and plans to return back home at discharge. Pt PCP Tereso Dominguez. Pt does not utilize  services or DME. Pt has no POA or living will. SS discussed alcohol resources and pt declined. Pts family to provide transportation at discharge. SS to follow and assist with discharge planning.   Discharge Needs Assessment       Row Name 04/21/25 1335       Living Environment    People in Home parent(s)    Name(s) of People in Home Mother Laurenureen    Current Living Arrangements home    Potentially Unsafe Housing Conditions none    In the past 12 months has the electric, gas, oil, or water company threatened to shut off services in your home? No    Primary Care Provided by self    Provides Primary Care For no one    Family Caregiver if Needed parent(s)    Family Caregiver Names Mother Tracee    Quality of Family Relationships helpful;involved;supportive    Able to Return to Prior Arrangements yes       Resource/Environmental Concerns    Resource/Environmental Concerns none       Transportation Needs    In the past 12 months, has lack of transportation kept you from medical appointments or from getting medications? no    In the past 12 months, has lack of transportation kept you from meetings, work, or from getting things needed for daily living? No       Food Insecurity    Within the past 12 months, you worried that your food would run out before you got the money to buy more. Never true    Within the past 12 months, the food you bought just didn't last and you didn't have money to get more. Never true       Transition Planning    Patient/Family Anticipates Transition to home with family    Transportation Anticipated family or friend will provide       Discharge Needs Assessment     Equipment Currently Used at Home none    Concerns to be Addressed discharge planning                   Discharge Plan       Row Name 04/21/25 2583       Plan    Plan SS received CM consult for D/C planning. SS spoke with pt at bedside on this date. Pt lives with parents and plans to return back home at discharge. Pt PCP Tereso Dominguez. Pt does not utilize  services or DME. Pt has no POA or living will. SS discussed alcohol resources and pt declined. Pts family to provide transportation at discharge. SS to follow and assist with discharge planning.          Expected Discharge Date and Time       Expected Discharge Date Expected Discharge Time    Apr 23, 2025            Demographic Summary       Row Name 04/21/25 6527       General Information    Admission Type inpatient    Arrived From emergency department    Referral Source nursing    Reason for Consult discharge planning    Preferred Language English               TAWANNA Borges

## 2025-04-21 NOTE — PLAN OF CARE
Goal Outcome Evaluation:               Patient Alert and oriented X 3. Disoriented to time. VSS on room air. No acute distress noted. Safety maintained. Call light and fluids in reach. Care plan on going.
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
## 2025-04-21 NOTE — PLAN OF CARE
Problem: Adult Inpatient Plan of Care  Goal: Plan of Care Review  Outcome: Not Progressing  Goal: Patient-Specific Goal (Individualized)  Outcome: Not Progressing  Goal: Absence of Hospital-Acquired Illness or Injury  Outcome: Not Progressing  Intervention: Identify and Manage Fall Risk  Intervention: Prevent Skin Injury  Intervention: Prevent and Manage VTE (Venous Thromboembolism) Risk  Goal: Optimal Comfort and Wellbeing  Outcome: Not Progressing  Intervention: Provide Person-Centered Care  Goal: Readiness for Transition of Care  Outcome: Not Progressing     Problem: Violence Risk or Actual  Goal: Anger and Impulse Control  Outcome: Not Progressing  Intervention: Minimize Safety Risk  Intervention: Promote Self-Control   Goal Outcome Evaluation:  Plan of Care Reviewed With: patient        Progress: no change

## 2025-04-21 NOTE — CONSULTS
Pt currently receiving EEG. Reviewed chart, discussed with nurses. Would not be ready for detox unit today either way, so will check on pt tomorrow.

## 2025-04-21 NOTE — PROGRESS NOTES
[facility] HOSPITALIST PROGRESS NOTE    Subjective     History:   Ruperto Maxwell is a 49 y.o. male admitted on 4/20/2025 secondary to Seizure due to alcohol withdrawal, uncomplicated     Procedures: None    CC: Follow up alcohol withdrawal    Patient seen and examined with TREMAINE Priest. Awake and alert with his girlfriend present at bedside. Some anxiety and tremors reported. No further episodes of seizure activity reported. No reported CP. No reported vomiting. No acute events reported.     History taken from: patient, chart, and RN.      Objective     Vital Signs  Temp:  [97.8 °F (36.6 °C)-99.5 °F (37.5 °C)] 98.7 °F (37.1 °C)  Heart Rate:  [] 105  Resp:  [16-20] 16  BP: (132-163)/() 147/106    Intake/Output Summary (Last 24 hours) at 4/21/2025 1443  Last data filed at 4/21/2025 1252  Gross per 24 hour   Intake 592 ml   Output --   Net 592 ml         Physical Exam:  General:    Awake, alert, in no acute distress   Heart:      Normal S1 and S2. Mildly tachycardic. No significant murmur, rubs or gallops appreciated.   Lungs:     Respirations regular, even and unlabored. Lungs clear to auscultation B/L. No wheezes, rales or rhonchi.   Abdomen:   Soft and nontender. No guarding, rebound tenderness or  organomegaly noted. Bowel sounds present x 4.   Extremities:  No clubbing, cyanosis or edema noted. Moves UE and LE equally B/L. (+) tremor      Results Review:    Results from last 7 days   Lab Units 04/21/25  0115   WBC 10*3/mm3 3.98   HEMOGLOBIN g/dL 13.5   PLATELETS 10*3/mm3 110*     Results from last 7 days   Lab Units 04/21/25  0651 04/21/25  0115   SODIUM mmol/L 135* 135*   POTASSIUM mmol/L 3.6 3.6   CHLORIDE mmol/L 97* 98   CO2 mmol/L 23.4 24.1   BUN mg/dL 7 7   CREATININE mg/dL 0.68* 0.67*   CALCIUM mg/dL 8.9 9.0   GLUCOSE mg/dL 87 85     Results from last 7 days   Lab Units 04/21/25  0651 04/21/25  0115   BILIRUBIN mg/dL 2.7* 2.6*   ALK PHOS U/L 168* 165*   AST (SGOT) U/L 100* 112*   ALT  (SGPT) U/L 43* 44*     Results from last 7 days   Lab Units 04/21/25  0651 04/21/25  0115   MAGNESIUM mg/dL 2.5 1.6     Results from last 7 days   Lab Units 04/21/25  0115   INR  1.06           Imaging Results (Last 24 Hours)       ** No results found for the last 24 hours. **              Medications:  chlordiazePOXIDE, 50 mg, Oral, Q8H  folic acid 1 mg in sodium chloride 0.9 % 50 mL IVPB, 1 mg, Intravenous, Daily  multivitamin with minerals, 1 tablet, Oral, Daily  mupirocin, 1 Application, Each Nare, BID  pantoprazole, 40 mg, Oral, Q AM  potassium chloride, 40 mEq, Oral, Q4H  senna-docusate sodium, 2 tablet, Oral, BID  sodium chloride, 10 mL, Intravenous, Q12H  thiamine (B-1) IV, 500 mg, Intravenous, Q8H   Followed by  [START ON 4/24/2025] thiamine (B-1) IV, 200 mg, Intravenous, Q8H   Followed by  [START ON 4/28/2025] thiamine, 100 mg, Oral, Daily               Assessment & Plan   Alcohol use disorder with withdrawal: Concern for withdrawal seizures PTA. Add scheduled Librium. Cont CIWA protocol with PRN regimen. Vitamin supplementation. Monitor and replace electrolytes. Safety and seizure precautions. Psych consulted to evaluate patient once medically stable.     Withdrawal seizures: Pt reports attempting to self detox at home PTA. No further episodes of seizure activity since admission to our facility. EEG pending. Cont treatment as outlined above. Cont to monitor closely.     Elevated liver enzymes: Likely 2/2 above. Viral hepatitis panel ordered. Order abdominal US. Repeat CMP in the AM.     Borderline hypokalemia: Mg is <2. Replace per protocol. Cont electrolyte replacement protocols and repeat labs in the AM.     Mild thrombocytopenia: Above likely contributing. Repeat CBC in the AM.     GERD: Start PPI    DVT PPX: Add Lovenox       Disposition Pending clinical course. Possible detox when medically stable.     Reddy Scruggs DO  04/21/25  14:43 EDT

## 2025-04-22 LAB
ALBUMIN SERPL-MCNC: 4 G/DL (ref 3.5–5.2)
ALBUMIN/GLOB SERPL: 1.3 G/DL
ALP SERPL-CCNC: 172 U/L (ref 39–117)
ALT SERPL W P-5'-P-CCNC: 40 U/L (ref 1–41)
ANION GAP SERPL CALCULATED.3IONS-SCNC: 11.8 MMOL/L (ref 5–15)
ANISOCYTOSIS BLD QL: NORMAL
AST SERPL-CCNC: 82 U/L (ref 1–40)
BASOPHILS # BLD AUTO: 0.09 10*3/MM3 (ref 0–0.2)
BASOPHILS NFR BLD AUTO: 2.2 % (ref 0–1.5)
BILIRUB SERPL-MCNC: 2.2 MG/DL (ref 0–1.2)
BUN SERPL-MCNC: 6 MG/DL (ref 6–20)
BUN/CREAT SERPL: 7.3 (ref 7–25)
CALCIUM SPEC-SCNC: 9.2 MG/DL (ref 8.6–10.5)
CHLORIDE SERPL-SCNC: 101 MMOL/L (ref 98–107)
CO2 SERPL-SCNC: 22.2 MMOL/L (ref 22–29)
CREAT SERPL-MCNC: 0.82 MG/DL (ref 0.76–1.27)
DEPRECATED RDW RBC AUTO: 56.6 FL (ref 37–54)
EGFRCR SERPLBLD CKD-EPI 2021: 106.4 ML/MIN/1.73
EOSINOPHIL # BLD AUTO: 0.12 10*3/MM3 (ref 0–0.4)
EOSINOPHIL NFR BLD AUTO: 2.9 % (ref 0.3–6.2)
ERYTHROCYTE [DISTWIDTH] IN BLOOD BY AUTOMATED COUNT: 15.7 % (ref 12.3–15.4)
FOLATE SERPL-MCNC: >20 NG/ML (ref 4.78–24.2)
GLOBULIN UR ELPH-MCNC: 3.1 GM/DL
GLUCOSE SERPL-MCNC: 105 MG/DL (ref 65–99)
HAV IGM SERPL QL IA: NORMAL
HBV CORE IGM SERPL QL IA: NORMAL
HBV SURFACE AG SERPL QL IA: NORMAL
HCT VFR BLD AUTO: 41 % (ref 37.5–51)
HCV AB SER QL: NORMAL
HGB BLD-MCNC: 14 G/DL (ref 13–17.7)
IMM GRANULOCYTES # BLD AUTO: 0.01 10*3/MM3 (ref 0–0.05)
IMM GRANULOCYTES NFR BLD AUTO: 0.2 % (ref 0–0.5)
INR PPP: 1.08 (ref 0.9–1.1)
LARGE PLATELETS: NORMAL
LYMPHOCYTES # BLD AUTO: 1.54 10*3/MM3 (ref 0.7–3.1)
LYMPHOCYTES NFR BLD AUTO: 37.3 % (ref 19.6–45.3)
MAGNESIUM SERPL-MCNC: 2.2 MG/DL (ref 1.6–2.6)
MCH RBC QN AUTO: 33.3 PG (ref 26.6–33)
MCHC RBC AUTO-ENTMCNC: 34.1 G/DL (ref 31.5–35.7)
MCV RBC AUTO: 97.6 FL (ref 79–97)
MONOCYTES # BLD AUTO: 0.67 10*3/MM3 (ref 0.1–0.9)
MONOCYTES NFR BLD AUTO: 16.2 % (ref 5–12)
NEUTROPHILS NFR BLD AUTO: 1.7 10*3/MM3 (ref 1.7–7)
NEUTROPHILS NFR BLD AUTO: 41.2 % (ref 42.7–76)
NRBC BLD AUTO-RTO: 0 /100 WBC (ref 0–0.2)
PHOSPHATE SERPL-MCNC: 2.4 MG/DL (ref 2.5–4.5)
PLATELET # BLD AUTO: 120 10*3/MM3 (ref 140–450)
PMV BLD AUTO: 11.1 FL (ref 6–12)
POTASSIUM SERPL-SCNC: 4 MMOL/L (ref 3.5–5.2)
PROT SERPL-MCNC: 7.1 G/DL (ref 6–8.5)
PROTHROMBIN TIME: 14.2 SECONDS (ref 11.6–15.1)
RBC # BLD AUTO: 4.2 10*6/MM3 (ref 4.14–5.8)
SMALL PLATELETS BLD QL SMEAR: NORMAL
SODIUM SERPL-SCNC: 135 MMOL/L (ref 136–145)
VIT B12 BLD-MCNC: 1003 PG/ML (ref 211–946)
WBC NRBC COR # BLD AUTO: 4.13 10*3/MM3 (ref 3.4–10.8)

## 2025-04-22 PROCEDURE — 85610 PROTHROMBIN TIME: CPT | Performed by: INTERNAL MEDICINE

## 2025-04-22 PROCEDURE — 80053 COMPREHEN METABOLIC PANEL: CPT | Performed by: INTERNAL MEDICINE

## 2025-04-22 PROCEDURE — 97161 PT EVAL LOW COMPLEX 20 MIN: CPT

## 2025-04-22 PROCEDURE — 25810000003 SODIUM CHLORIDE 0.9 % SOLUTION: Performed by: INTERNAL MEDICINE

## 2025-04-22 PROCEDURE — 25010000002 DIAZEPAM PER 5 MG: Performed by: INTERNAL MEDICINE

## 2025-04-22 PROCEDURE — 99222 1ST HOSP IP/OBS MODERATE 55: CPT | Performed by: PSYCHIATRY & NEUROLOGY

## 2025-04-22 PROCEDURE — 85007 BL SMEAR W/DIFF WBC COUNT: CPT | Performed by: INTERNAL MEDICINE

## 2025-04-22 PROCEDURE — 99232 SBSQ HOSP IP/OBS MODERATE 35: CPT | Performed by: INTERNAL MEDICINE

## 2025-04-22 PROCEDURE — 85025 COMPLETE CBC W/AUTO DIFF WBC: CPT | Performed by: INTERNAL MEDICINE

## 2025-04-22 PROCEDURE — 82746 ASSAY OF FOLIC ACID SERUM: CPT | Performed by: INTERNAL MEDICINE

## 2025-04-22 PROCEDURE — 82607 VITAMIN B-12: CPT | Performed by: INTERNAL MEDICINE

## 2025-04-22 PROCEDURE — 25010000002 HYDRALAZINE PER 20 MG: Performed by: INTERNAL MEDICINE

## 2025-04-22 PROCEDURE — 25010000002 THIAMINE HCL 200 MG/2ML SOLUTION 2 ML VIAL: Performed by: INTERNAL MEDICINE

## 2025-04-22 PROCEDURE — 76700 US EXAM ABDOM COMPLETE: CPT | Performed by: RADIOLOGY

## 2025-04-22 PROCEDURE — 84100 ASSAY OF PHOSPHORUS: CPT | Performed by: INTERNAL MEDICINE

## 2025-04-22 PROCEDURE — 80074 ACUTE HEPATITIS PANEL: CPT | Performed by: INTERNAL MEDICINE

## 2025-04-22 PROCEDURE — 25010000002 FOLIC ACID 5 MG/ML SOLUTION 10 ML VIAL: Performed by: INTERNAL MEDICINE

## 2025-04-22 PROCEDURE — 83735 ASSAY OF MAGNESIUM: CPT | Performed by: INTERNAL MEDICINE

## 2025-04-22 RX ORDER — HYDRALAZINE HYDROCHLORIDE 20 MG/ML
10 INJECTION INTRAMUSCULAR; INTRAVENOUS EVERY 6 HOURS PRN
Status: DISCONTINUED | OUTPATIENT
Start: 2025-04-22 | End: 2025-04-26 | Stop reason: HOSPADM

## 2025-04-22 RX ORDER — LOSARTAN POTASSIUM 25 MG/1
25 TABLET ORAL
Status: DISCONTINUED | OUTPATIENT
Start: 2025-04-22 | End: 2025-04-23

## 2025-04-22 RX ADMIN — LOSARTAN POTASSIUM 25 MG: 25 TABLET, FILM COATED ORAL at 09:34

## 2025-04-22 RX ADMIN — THIAMINE HYDROCHLORIDE 500 MG: 100 INJECTION, SOLUTION INTRAMUSCULAR; INTRAVENOUS at 13:51

## 2025-04-22 RX ADMIN — TRIAMCINOLONE ACETONIDE 1 APPLICATION: 1 CREAM TOPICAL at 09:36

## 2025-04-22 RX ADMIN — MULTIVITAMIN TABLET 1 TABLET: TABLET at 09:34

## 2025-04-22 RX ADMIN — DIAZEPAM 10 MG: 5 INJECTION INTRAMUSCULAR; INTRAVENOUS at 14:06

## 2025-04-22 RX ADMIN — GABAPENTIN 600 MG: 300 CAPSULE ORAL at 21:10

## 2025-04-22 RX ADMIN — MUPIROCIN 1 APPLICATION: 20 OINTMENT TOPICAL at 21:11

## 2025-04-22 RX ADMIN — PANTOPRAZOLE SODIUM 40 MG: 40 TABLET, DELAYED RELEASE ORAL at 05:13

## 2025-04-22 RX ADMIN — THIAMINE HYDROCHLORIDE 500 MG: 100 INJECTION, SOLUTION INTRAMUSCULAR; INTRAVENOUS at 21:10

## 2025-04-22 RX ADMIN — FOLIC ACID 1 MG: 5 INJECTION, SOLUTION INTRAMUSCULAR; INTRAVENOUS; SUBCUTANEOUS at 09:33

## 2025-04-22 RX ADMIN — THIAMINE HYDROCHLORIDE 500 MG: 100 INJECTION, SOLUTION INTRAMUSCULAR; INTRAVENOUS at 05:13

## 2025-04-22 RX ADMIN — MUPIROCIN 1 APPLICATION: 20 OINTMENT TOPICAL at 09:36

## 2025-04-22 RX ADMIN — DIAZEPAM 15 MG: 5 INJECTION INTRAMUSCULAR; INTRAVENOUS at 16:28

## 2025-04-22 RX ADMIN — TRIAMCINOLONE ACETONIDE 1 APPLICATION: 1 CREAM TOPICAL at 21:11

## 2025-04-22 RX ADMIN — HYDRALAZINE HYDROCHLORIDE 10 MG: 20 INJECTION INTRAMUSCULAR; INTRAVENOUS at 13:51

## 2025-04-22 RX ADMIN — Medication 10 ML: at 09:36

## 2025-04-22 RX ADMIN — CHLORDIAZEPOXIDE HYDROCHLORIDE 50 MG: 25 CAPSULE ORAL at 21:10

## 2025-04-22 RX ADMIN — Medication 10 ML: at 21:10

## 2025-04-22 RX ADMIN — DEXMEDETOMIDINE 0.2 MCG/KG/HR: 200 INJECTION, SOLUTION INTRAVENOUS at 18:12

## 2025-04-22 RX ADMIN — SENNOSIDES AND DOCUSATE SODIUM 2 TABLET: 50; 8.6 TABLET ORAL at 09:34

## 2025-04-22 RX ADMIN — Medication 1 TABLET: at 09:34

## 2025-04-22 RX ADMIN — CHLORDIAZEPOXIDE HYDROCHLORIDE 50 MG: 25 CAPSULE ORAL at 13:50

## 2025-04-22 RX ADMIN — CHLORDIAZEPOXIDE HYDROCHLORIDE 50 MG: 25 CAPSULE ORAL at 05:13
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
## 2025-04-22 NOTE — PLAN OF CARE
Goal Outcome Evaluation:         Patient Alert and oriented to self and place. Disoriented to time and situation. VSS on room air. Patient noted to be uncooperative with care. Patient continues to pull off tele monitor wires, pulse ox and BP cuff off. Patient noted to be picking at IVs . Patient refuses to stay in bed and continues to run back and forth to the restroom. Patient refusing to use call light and to ask for assistance. Patient refuses to use urinal or any measuring device for staff to monitor urinary output. No acute distress noted. Safety maintained. Call light and fluids in reach. Care plan on going.

## 2025-04-22 NOTE — PLAN OF CARE
Goal Outcome Evaluation:        Problem: Adult Inpatient Plan of Care  Goal: Plan of Care Review  Outcome: Progressing  Flowsheets  Taken 4/22/2025 0438 by Marybel Dominguez RN  Progress: no change  Outcome Evaluation: Pt is ANN/Hany. LAURITA RA. Pt has no requests at this time. Bed in lowest position and call light within reach.  Taken 4/21/2025 0443 by Kendra Zamarripa RN  Plan of Care Reviewed With: patient  Goal: Patient-Specific Goal (Individualized)  Outcome: Progressing  Goal: Absence of Hospital-Acquired Illness or Injury  Outcome: Progressing  Intervention: Identify and Manage Fall Risk  Recent Flowsheet Documentation  Taken 4/22/2025 0300 by Marybel Dominguez RN  Safety Promotion/Fall Prevention:   activity supervised   assistive device/personal items within reach   clutter free environment maintained   room organization consistent   safety round/check completed  Taken 4/22/2025 0100 by Marybel Dominguez RN  Safety Promotion/Fall Prevention:   activity supervised   assistive device/personal items within reach   clutter free environment maintained   room organization consistent   safety round/check completed  Taken 4/21/2025 2300 by Marybel Dominguez RN  Safety Promotion/Fall Prevention:   activity supervised   assistive device/personal items within reach   clutter free environment maintained   room organization consistent   safety round/check completed  Taken 4/21/2025 2100 by Marybel Dominguez RN  Safety Promotion/Fall Prevention:   activity supervised   assistive device/personal items within reach   clutter free environment maintained   room organization consistent   safety round/check completed  Taken 4/21/2025 1900 by Marybel Dominguez RN  Safety Promotion/Fall Prevention:   activity supervised   assistive device/personal items within reach   clutter free environment maintained   room organization consistent   safety round/check completed  Intervention: Prevent Skin Injury  Recent Flowsheet Documentation  Taken  4/22/2025 0200 by Marybel Dominguez RN  Skin Protection:   incontinence pads utilized   transparent dressing maintained  Taken 4/21/2025 2000 by Marybel Dominguez RN  Skin Protection:   incontinence pads utilized   transparent dressing maintained  Intervention: Prevent and Manage VTE (Venous Thromboembolism) Risk  Recent Flowsheet Documentation  Taken 4/22/2025 0200 by Marybel Dominguez RN  VTE Prevention/Management: (Lovenox SQ) other (see comments)  Taken 4/21/2025 2000 by Marybel Dominguez RN  VTE Prevention/Management: (Lovenox SQ) other (see comments)  Intervention: Prevent Infection  Recent Flowsheet Documentation  Taken 4/22/2025 0300 by Marybel Dominguez RN  Infection Prevention:   rest/sleep promoted   single patient room provided  Taken 4/22/2025 0100 by Marybel Dominguez RN  Infection Prevention:   rest/sleep promoted   single patient room provided  Taken 4/21/2025 2300 by Marybel Dominguez RN  Infection Prevention:   rest/sleep promoted   single patient room provided  Taken 4/21/2025 2100 by Marybel Dominguez RN  Infection Prevention:   rest/sleep promoted   single patient room provided  Taken 4/21/2025 1900 by Marybel Dominguez RN  Infection Prevention:   rest/sleep promoted   single patient room provided  Goal: Optimal Comfort and Wellbeing  Outcome: Progressing  Intervention: Provide Person-Centered Care  Recent Flowsheet Documentation  Taken 4/22/2025 0200 by Marybel Dominguez RN  Trust Relationship/Rapport:   care explained   choices provided   emotional support provided   questions answered   questions encouraged   reassurance provided   thoughts/feelings acknowledged  Taken 4/21/2025 2000 by Marybel Dominguez RN  Trust Relationship/Rapport:   choices provided   empathic listening provided   questions encouraged   reassurance provided   thoughts/feelings acknowledged  Goal: Readiness for Transition of Care  Outcome: Progressing     Problem: Violence Risk or Actual  Goal: Anger and Impulse Control  Outcome:  Progressing  Intervention: Minimize Safety Risk  Recent Flowsheet Documentation  Taken 4/22/2025 0300 by Marybel Dominguez RN  Enhanced Safety Measures: bed alarm set  Taken 4/22/2025 0100 by Marybel Dominguez RN  Enhanced Safety Measures: bed alarm set  Taken 4/21/2025 2300 by Marybel Dominguez RN  Enhanced Safety Measures: bed alarm set  Taken 4/21/2025 2100 by Marybel Dominguez RN  Enhanced Safety Measures: bed alarm set  Taken 4/21/2025 1900 by Marybel Dominguez RN  Enhanced Safety Measures: bed alarm set        Progress: no change  Outcome Evaluation: Pt is ANN/Hany. ROZS. RA. Pt has no requests at this time. Bed in lowest position and call light within reach.

## 2025-04-22 NOTE — PROGRESS NOTES
[facility] HOSPITALIST PROGRESS NOTE    Subjective     History:   Ruperto Maxwell is a 49 y.o. male admitted on 4/20/2025 secondary to Seizure due to alcohol withdrawal, uncomplicated     Procedures: None    CC: Follow up alcohol withdrawal    Patient seen and examined with TREMAINE Amor. Awake and alert with his girlfriend present at bedside. States he feels better today. No reported CP. No reported vomiting. No further episodes of seizure activity reported. BP has been elevated. No acute events reported.     History taken from: patient, chart, and RN.      Objective     Vital Signs  Temp:  [97.7 °F (36.5 °C)-98.7 °F (37.1 °C)] 98.7 °F (37.1 °C)  Heart Rate:  [] 100  Resp:  [8-29] 13  BP: (111-161)/() 147/108    Intake/Output Summary (Last 24 hours) at 4/22/2025 1352  Last data filed at 4/22/2025 1300  Gross per 24 hour   Intake 830.77 ml   Output --   Net 830.77 ml         Physical Exam:  General:    Awake, alert, in no acute distress   Heart:      Normal S1 and S2. Regular rate and rhythm. No significant murmur, rubs or gallops appreciated.   Lungs:     Respirations regular, even and unlabored. Lungs clear to auscultation B/L. No wheezes, rales or rhonchi.   Abdomen:   Soft and nontender. No guarding, rebound tenderness or  organomegaly noted. Bowel sounds present x 4.   Extremities:  No clubbing, cyanosis or edema noted. Moves UE and LE equally B/L. (+) slight tremor but improved.      Results Review:    Results from last 7 days   Lab Units 04/22/25  0059 04/21/25  0115   WBC 10*3/mm3 4.13 3.98   HEMOGLOBIN g/dL 14.0 13.5   PLATELETS 10*3/mm3 120* 110*     Results from last 7 days   Lab Units 04/22/25  0059 04/21/25  1528 04/21/25  0651 04/21/25  0115   SODIUM mmol/L 135*  --  135* 135*   POTASSIUM mmol/L 4.0 4.1 3.6 3.6   CHLORIDE mmol/L 101  --  97* 98   CO2 mmol/L 22.2  --  23.4 24.1   BUN mg/dL 6  --  7 7   CREATININE mg/dL 0.82  --  0.68* 0.67*   CALCIUM mg/dL 9.2  --  8.9 9.0    GLUCOSE mg/dL 105*  --  87 85     Results from last 7 days   Lab Units 04/22/25  0059 04/21/25  0651 04/21/25  0115   BILIRUBIN mg/dL 2.2* 2.7* 2.6*   ALK PHOS U/L 172* 168* 165*   AST (SGOT) U/L 82* 100* 112*   ALT (SGPT) U/L 40 43* 44*     Results from last 7 days   Lab Units 04/22/25  0059 04/21/25  0651 04/21/25  0115   MAGNESIUM mg/dL 2.2 2.5 1.6     Results from last 7 days   Lab Units 04/22/25  0059 04/21/25  0115   INR  1.08 1.06           Imaging Results (Last 24 Hours)       Procedure Component Value Units Date/Time    US Abdomen Complete [[id number]] Collected: 04/22/25 0828     Updated: 04/22/25 0831    Narrative:      EXAM:    US Abdomen Complete     EXAM DATE:    4/21/2025 9:24 PM     CLINICAL HISTORY:    Elevated liver enzymes     TECHNIQUE:    Real-time ultrasound of the abdomen with image documentation.     COMPARISON:    No relevant prior studies available.     FINDINGS:    Liver:  Marked diffuse fatty infiltration of liver.  No intrahepatic  bile duct dilation.    Gallbladder:  Unremarkable.  No gallstones.    Common bile duct:  Common bile duct is 4 mm in diameter.  No stones.   No dilation.    Pancreas:  Limited assessment of pancreas due to bowel gas shadowing.    Kidneys:  Unremarkable.  No stones.  No hydronephrosis.  Right kidney  is 12.7 x 6.3 cm.  Left kidney is 10.6 x 5.6 cm.    Spleen:  Spleen is 11.9 cm.    Aorta:  Limited assessment of the aorta due to bowel gas shadowing.   No aneurysm.    Inferior vena cava:  Unremarkable.       Impression:        Marked diffuse fatty infiltration of liver. Otherwise unremarkable  exam.     This report was finalized on 4/22/2025 8:29 AM by Dr. Jay Wood MD.                 Medications:  chlordiazePOXIDE, 50 mg, Oral, Q8H  enoxaparin sodium, 40 mg, Subcutaneous, Q24H  folic acid 1 mg in sodium chloride 0.9 % 50 mL IVPB, 1 mg, Intravenous, Daily  gabapentin, 600 mg, Oral, Nightly  losartan, 25 mg, Oral, Q24H  multivitamin, 1 tablet, Oral,  Daily  multivitamin with minerals, 1 tablet, Oral, Daily  mupirocin, 1 Application, Each Nare, BID  pantoprazole, 40 mg, Oral, Q AM  senna-docusate sodium, 2 tablet, Oral, BID  sodium chloride, 10 mL, Intravenous, Q12H  thiamine (B-1) IV, 500 mg, Intravenous, Q8H   Followed by  [START ON 4/24/2025] thiamine (B-1) IV, 200 mg, Intravenous, Q8H   Followed by  [START ON 4/28/2025] thiamine, 100 mg, Oral, Daily  triamcinolone, 1 Application, Topical, BID               Assessment & Plan   Alcohol use disorder with withdrawal: Concern for withdrawal seizures PTA. Cont scheduled Librium. Cont CIWA protocol with PRN regimen. Vitamin supplementation. Monitor and replace electrolytes. Safety and seizure precautions. Psych consulted to evaluate patient once medically stable.     Withdrawal seizures: Pt reports attempting to self detox at home PTA. No further episodes of seizure activity since admission to our facility. No evidence of seizure activity on EEG. Cont treatment as outlined above. Cont to monitor closely.     Essential HTN: Acute withdrawal likely contributing to elevated BP. Add losartan and PRN IV hydralazine. Cont to monitor.     Elevated liver enzymes: Likely 2/2 above. Viral hepatitis panel is non-reactive. Abdominal US reveals marked diffuse fatty infiltration of the liver. Repeat CMP in the AM.     Borderline hypokalemia: K+ improved with supplementation. Mg initially <2 but improved with supplementation. Cont electrolyte replacement protocols and repeat labs in the AM.     Mild thrombocytopenia: Above likely contributing. Improved today. Repeat CBC in the AM.     GERD: PPI    DVT PPX: Lovenox     Disposition Pending clinical course. Possible detox when medically stable.     Reddy Scruggs DO  04/22/25  13:52 EDT

## 2025-04-23 LAB
ALBUMIN SERPL-MCNC: 3.8 G/DL (ref 3.5–5.2)
ALBUMIN/GLOB SERPL: 1.2 G/DL
ALP SERPL-CCNC: 153 U/L (ref 39–117)
ALT SERPL W P-5'-P-CCNC: 35 U/L (ref 1–41)
ANION GAP SERPL CALCULATED.3IONS-SCNC: 11.1 MMOL/L (ref 5–15)
ANION GAP SERPL CALCULATED.3IONS-SCNC: 14.1 MMOL/L (ref 5–15)
AST SERPL-CCNC: 57 U/L (ref 1–40)
BASOPHILS # BLD AUTO: 0.07 10*3/MM3 (ref 0–0.2)
BASOPHILS NFR BLD AUTO: 1.8 % (ref 0–1.5)
BILIRUB SERPL-MCNC: 1.8 MG/DL (ref 0–1.2)
BUN SERPL-MCNC: 11 MG/DL (ref 6–20)
BUN SERPL-MCNC: 11 MG/DL (ref 6–20)
BUN/CREAT SERPL: 12.2 (ref 7–25)
BUN/CREAT SERPL: 14.5 (ref 7–25)
CALCIUM SPEC-SCNC: 9.3 MG/DL (ref 8.6–10.5)
CALCIUM SPEC-SCNC: 9.4 MG/DL (ref 8.6–10.5)
CHLORIDE SERPL-SCNC: 105 MMOL/L (ref 98–107)
CHLORIDE SERPL-SCNC: 97 MMOL/L (ref 98–107)
CO2 SERPL-SCNC: 18.9 MMOL/L (ref 22–29)
CO2 SERPL-SCNC: 19.9 MMOL/L (ref 22–29)
CREAT SERPL-MCNC: 0.76 MG/DL (ref 0.76–1.27)
CREAT SERPL-MCNC: 0.9 MG/DL (ref 0.76–1.27)
DEPRECATED RDW RBC AUTO: 57.9 FL (ref 37–54)
EGFRCR SERPLBLD CKD-EPI 2021: 103.4 ML/MIN/1.73
EGFRCR SERPLBLD CKD-EPI 2021: 108.8 ML/MIN/1.73
EOSINOPHIL # BLD AUTO: 0.09 10*3/MM3 (ref 0–0.4)
EOSINOPHIL NFR BLD AUTO: 2.3 % (ref 0.3–6.2)
ERYTHROCYTE [DISTWIDTH] IN BLOOD BY AUTOMATED COUNT: 15.4 % (ref 12.3–15.4)
GLOBULIN UR ELPH-MCNC: 3.2 GM/DL
GLUCOSE SERPL-MCNC: 127 MG/DL (ref 65–99)
GLUCOSE SERPL-MCNC: 131 MG/DL (ref 65–99)
HCT VFR BLD AUTO: 40.4 % (ref 37.5–51)
HGB BLD-MCNC: 13.3 G/DL (ref 13–17.7)
IMM GRANULOCYTES # BLD AUTO: 0.01 10*3/MM3 (ref 0–0.05)
IMM GRANULOCYTES NFR BLD AUTO: 0.3 % (ref 0–0.5)
LYMPHOCYTES # BLD AUTO: 1.05 10*3/MM3 (ref 0.7–3.1)
LYMPHOCYTES NFR BLD AUTO: 26.3 % (ref 19.6–45.3)
MAGNESIUM SERPL-MCNC: 1.9 MG/DL (ref 1.6–2.6)
MCH RBC QN AUTO: 33.3 PG (ref 26.6–33)
MCHC RBC AUTO-ENTMCNC: 32.9 G/DL (ref 31.5–35.7)
MCV RBC AUTO: 101.3 FL (ref 79–97)
MONOCYTES # BLD AUTO: 0.66 10*3/MM3 (ref 0.1–0.9)
MONOCYTES NFR BLD AUTO: 16.5 % (ref 5–12)
NEUTROPHILS NFR BLD AUTO: 2.12 10*3/MM3 (ref 1.7–7)
NEUTROPHILS NFR BLD AUTO: 52.8 % (ref 42.7–76)
NRBC BLD AUTO-RTO: 0 /100 WBC (ref 0–0.2)
PHOSPHATE SERPL-MCNC: 3.5 MG/DL (ref 2.5–4.5)
PLATELET # BLD AUTO: 112 10*3/MM3 (ref 140–450)
PMV BLD AUTO: 11.3 FL (ref 6–12)
POTASSIUM SERPL-SCNC: 3.7 MMOL/L (ref 3.5–5.2)
POTASSIUM SERPL-SCNC: 3.8 MMOL/L (ref 3.5–5.2)
PROT SERPL-MCNC: 7 G/DL (ref 6–8.5)
RBC # BLD AUTO: 3.99 10*6/MM3 (ref 4.14–5.8)
SODIUM SERPL-SCNC: 127 MMOL/L (ref 136–145)
SODIUM SERPL-SCNC: 139 MMOL/L (ref 136–145)
WBC NRBC COR # BLD AUTO: 4 10*3/MM3 (ref 3.4–10.8)

## 2025-04-23 PROCEDURE — 83735 ASSAY OF MAGNESIUM: CPT | Performed by: INTERNAL MEDICINE

## 2025-04-23 PROCEDURE — 25010000002 MAGNESIUM SULFATE 4 GM/100ML SOLUTION: Performed by: INTERNAL MEDICINE

## 2025-04-23 PROCEDURE — 25010000002 THIAMINE HCL 200 MG/2ML SOLUTION 2 ML VIAL: Performed by: INTERNAL MEDICINE

## 2025-04-23 PROCEDURE — 85025 COMPLETE CBC W/AUTO DIFF WBC: CPT | Performed by: INTERNAL MEDICINE

## 2025-04-23 PROCEDURE — 99232 SBSQ HOSP IP/OBS MODERATE 35: CPT | Performed by: INTERNAL MEDICINE

## 2025-04-23 PROCEDURE — 84100 ASSAY OF PHOSPHORUS: CPT | Performed by: INTERNAL MEDICINE

## 2025-04-23 PROCEDURE — 25810000003 SODIUM CHLORIDE 0.9 % SOLUTION: Performed by: INTERNAL MEDICINE

## 2025-04-23 PROCEDURE — 80053 COMPREHEN METABOLIC PANEL: CPT | Performed by: INTERNAL MEDICINE

## 2025-04-23 PROCEDURE — 25010000002 ENOXAPARIN PER 10 MG: Performed by: INTERNAL MEDICINE

## 2025-04-23 PROCEDURE — 25010000002 FOLIC ACID 5 MG/ML SOLUTION 10 ML VIAL: Performed by: INTERNAL MEDICINE

## 2025-04-23 PROCEDURE — 25010000002 DIAZEPAM PER 5 MG: Performed by: INTERNAL MEDICINE

## 2025-04-23 RX ORDER — LOSARTAN POTASSIUM 50 MG/1
50 TABLET ORAL
Status: DISCONTINUED | OUTPATIENT
Start: 2025-04-23 | End: 2025-04-25

## 2025-04-23 RX ORDER — MAGNESIUM SULFATE HEPTAHYDRATE 40 MG/ML
4 INJECTION, SOLUTION INTRAVENOUS ONCE
Status: COMPLETED | OUTPATIENT
Start: 2025-04-23 | End: 2025-04-23

## 2025-04-23 RX ADMIN — FOLIC ACID 1 MG: 5 INJECTION, SOLUTION INTRAMUSCULAR; INTRAVENOUS; SUBCUTANEOUS at 09:46

## 2025-04-23 RX ADMIN — ENOXAPARIN SODIUM 40 MG: 40 INJECTION SUBCUTANEOUS at 17:10

## 2025-04-23 RX ADMIN — THIAMINE HYDROCHLORIDE 500 MG: 100 INJECTION, SOLUTION INTRAMUSCULAR; INTRAVENOUS at 13:03

## 2025-04-23 RX ADMIN — DEXMEDETOMIDINE 0.9 MCG/KG/HR: 200 INJECTION, SOLUTION INTRAVENOUS at 08:53

## 2025-04-23 RX ADMIN — CHLORDIAZEPOXIDE HYDROCHLORIDE 50 MG: 25 CAPSULE ORAL at 13:03

## 2025-04-23 RX ADMIN — MAGNESIUM SULFATE HEPTAHYDRATE 4 G: 40 INJECTION, SOLUTION INTRAVENOUS at 02:08

## 2025-04-23 RX ADMIN — MUPIROCIN 1 APPLICATION: 20 OINTMENT TOPICAL at 21:17

## 2025-04-23 RX ADMIN — TRIAMCINOLONE ACETONIDE 1 APPLICATION: 1 CREAM TOPICAL at 22:41

## 2025-04-23 RX ADMIN — DIAZEPAM 10 MG: 5 INJECTION INTRAMUSCULAR; INTRAVENOUS at 06:48

## 2025-04-23 RX ADMIN — SENNOSIDES AND DOCUSATE SODIUM 2 TABLET: 50; 8.6 TABLET ORAL at 22:41

## 2025-04-23 RX ADMIN — THIAMINE HYDROCHLORIDE 500 MG: 100 INJECTION, SOLUTION INTRAMUSCULAR; INTRAVENOUS at 21:17

## 2025-04-23 RX ADMIN — Medication 10 ML: at 08:55

## 2025-04-23 RX ADMIN — TRIAMCINOLONE ACETONIDE 1 APPLICATION: 1 CREAM TOPICAL at 08:55

## 2025-04-23 RX ADMIN — Medication 10 ML: at 21:17

## 2025-04-23 RX ADMIN — DIAZEPAM 10 MG: 5 INJECTION INTRAMUSCULAR; INTRAVENOUS at 23:19

## 2025-04-23 RX ADMIN — THIAMINE HYDROCHLORIDE 500 MG: 100 INJECTION, SOLUTION INTRAMUSCULAR; INTRAVENOUS at 05:49

## 2025-04-23 RX ADMIN — MUPIROCIN 1 APPLICATION: 20 OINTMENT TOPICAL at 08:55

## 2025-04-23 RX ADMIN — GABAPENTIN 600 MG: 300 CAPSULE ORAL at 22:40

## 2025-04-23 RX ADMIN — CHLORDIAZEPOXIDE HYDROCHLORIDE 50 MG: 25 CAPSULE ORAL at 22:40

## 2025-04-23 NOTE — PLAN OF CARE
Goal Outcome Evaluation:        Problem: Adult Inpatient Plan of Care  Goal: Plan of Care Review  Outcome: Progressing  Flowsheets  Taken 4/23/2025 0537 by Marybel Dominguez RN  Progress: no change  Outcome Evaluation: VSS. RA. Precedex gtt infusing per order, see MAR. Bed in lowest position and call light within reach. Significant other at bedside.  Taken 4/21/2025 0443 by Kendra Zamarripa RN  Plan of Care Reviewed With: patient  Goal: Patient-Specific Goal (Individualized)  Outcome: Progressing  Goal: Absence of Hospital-Acquired Illness or Injury  Outcome: Progressing  Intervention: Identify and Manage Fall Risk  Recent Flowsheet Documentation  Taken 4/23/2025 0500 by Marybel Dominguez RN  Safety Promotion/Fall Prevention:   activity supervised   assistive device/personal items within reach   clutter free environment maintained   room organization consistent   safety round/check completed  Taken 4/23/2025 0300 by Marybel Dominguez RN  Safety Promotion/Fall Prevention:   activity supervised   assistive device/personal items within reach   clutter free environment maintained   room organization consistent   safety round/check completed  Taken 4/23/2025 0100 by Marybel Dominguez RN  Safety Promotion/Fall Prevention:   activity supervised   assistive device/personal items within reach   clutter free environment maintained  Taken 4/22/2025 2300 by Marybel Dominguez RN  Safety Promotion/Fall Prevention:   activity supervised   assistive device/personal items within reach   clutter free environment maintained   room organization consistent   safety round/check completed  Taken 4/22/2025 2100 by Marybel Dominguez RN  Safety Promotion/Fall Prevention:   activity supervised   assistive device/personal items within reach   clutter free environment maintained   safety round/check completed   room organization consistent  Taken 4/22/2025 1900 by Marybel Dominguez RN  Safety Promotion/Fall Prevention:   activity supervised   assistive  device/personal items within reach   clutter free environment maintained   safety round/check completed   room organization consistent  Intervention: Prevent Skin Injury  Recent Flowsheet Documentation  Taken 4/23/2025 0200 by Marybel Dominguez RN  Skin Protection:   incontinence pads utilized   transparent dressing maintained  Taken 4/22/2025 2117 by Marybel Dominguez RN  Skin Protection:   incontinence pads utilized   transparent dressing maintained  Intervention: Prevent and Manage VTE (Venous Thromboembolism) Risk  Recent Flowsheet Documentation  Taken 4/23/2025 0200 by Marybel Dominguez RN  VTE Prevention/Management: (Lovenox SQ) other (see comments)  Taken 4/22/2025 2117 by Marybel Dominguez RN  VTE Prevention/Management: (Lovenox SQ) other (see comments)  Intervention: Prevent Infection  Recent Flowsheet Documentation  Taken 4/23/2025 0500 by Marybel Dominguez RN  Infection Prevention:   single patient room provided   rest/sleep promoted  Taken 4/23/2025 0300 by Marybel Dominguez RN  Infection Prevention:   rest/sleep promoted   single patient room provided  Taken 4/23/2025 0100 by Marybel Dominguez RN  Infection Prevention:   rest/sleep promoted   single patient room provided  Taken 4/22/2025 2300 by Marybel Dominguez RN  Infection Prevention:   rest/sleep promoted   single patient room provided  Taken 4/22/2025 2100 by Marybel Dominguez RN  Infection Prevention:   rest/sleep promoted   single patient room provided  Taken 4/22/2025 1900 by Marybel Dominguez RN  Infection Prevention:   rest/sleep promoted   single patient room provided  Goal: Optimal Comfort and Wellbeing  Outcome: Progressing  Intervention: Provide Person-Centered Care  Recent Flowsheet Documentation  Taken 4/22/2025 2117 by Marybel Dominguez RN  Trust Relationship/Rapport:   choices provided   emotional support provided   empathic listening provided   questions encouraged   thoughts/feelings acknowledged  Goal: Readiness for Transition of Care  Outcome:  Progressing     Problem: Violence Risk or Actual  Goal: Anger and Impulse Control  Outcome: Progressing  Intervention: Minimize Safety Risk  Recent Flowsheet Documentation  Taken 4/23/2025 0500 by Marybel Dominguez RN  Enhanced Safety Measures: bed alarm set  Taken 4/23/2025 0300 by Marybel Dominguez, RN  Enhanced Safety Measures: bed alarm set  Taken 4/22/2025 2300 by Marybel Dominguez RN  Enhanced Safety Measures: bed alarm set  Taken 4/22/2025 2100 by Marybel Dominguez, RN  Enhanced Safety Measures: bed alarm set  Taken 4/22/2025 1900 by Marybel Dominguez RN  Enhanced Safety Measures: bed alarm set        Progress: no change  Outcome Evaluation: VSS. RA. Precedex gtt infusing per order, see MAR. Bed in lowest position and call light within reach. Significant other at bedside.

## 2025-04-23 NOTE — PROGRESS NOTES
[facility] HOSPITALIST PROGRESS NOTE    Subjective     History:   Ruperto Maxwell is a 49 y.o. male admitted on 4/20/2025 secondary to Seizure due to alcohol withdrawal, uncomplicated     Procedures: None    CC: Follow up alcohol withdrawal    Patient seen and examined with TREMAINE Kaiser. Appears to be resting comfortably during my encounter with his significant other and parents present at bedside. Currently on Precedex. Requiring PRN's per CIWA with intermittent agitation reported. Became increasingly confused and agitated yesterday wishing to leave AMA at one point. Discussed with patient and did not feel he had capacity to leave AMA. He ultimately became more calm and agreed to remain hospitalized. No reported CP. No reported vomiting. No acute events reported.     History taken from: patient's family, chart, and RN.      Objective     Vital Signs  Temp:  [97.5 °F (36.4 °C)-98.3 °F (36.8 °C)] 97.5 °F (36.4 °C)  Heart Rate:  [] 60  Resp:  [7-22] 14  BP: (102-170)/() 113/89    Intake/Output Summary (Last 24 hours) at 4/23/2025 1339  Last data filed at 4/23/2025 1200  Gross per 24 hour   Intake 967.03 ml   Output 1250 ml   Net -282.97 ml         Physical Exam:  General:    Appears to be resting comfortably, in no acute distress   Heart:      Normal S1 and S2. Regular rate and rhythm. No significant murmur, rubs or gallops appreciated.   Lungs:     Respirations regular, even and unlabored. Lungs clear to auscultation B/L. No wheezes, rales or rhonchi.   Abdomen:   Soft and nontender. No guarding, rebound tenderness or  organomegaly noted. Bowel sounds present x 4.   Extremities:  No clubbing, cyanosis or edema noted.       Results Review:    Results from last 7 days   Lab Units 04/23/25  0024 04/22/25  0059 04/21/25  0115   WBC 10*3/mm3 4.00 4.13 3.98   HEMOGLOBIN g/dL 13.3 14.0 13.5   PLATELETS 10*3/mm3 112* 120* 110*     Results from last 7 days   Lab Units 04/23/25  0820 04/23/25  0024  04/22/25  0059 04/21/25  1528 04/21/25  0651 04/21/25  0115   SODIUM mmol/L 139 127* 135*  --  135* 135*   POTASSIUM mmol/L 3.8 3.7 4.0 4.1 3.6 3.6   CHLORIDE mmol/L 105 97* 101  --  97* 98   CO2 mmol/L 19.9* 18.9* 22.2  --  23.4 24.1   BUN mg/dL 11 11 6  --  7 7   CREATININE mg/dL 0.76 0.90 0.82  --  0.68* 0.67*   CALCIUM mg/dL 9.4 9.3 9.2  --  8.9 9.0   GLUCOSE mg/dL 131* 127* 105*  --  87 85     Results from last 7 days   Lab Units 04/23/25  0024 04/22/25  0059 04/21/25  0651 04/21/25  0115   BILIRUBIN mg/dL 1.8* 2.2* 2.7* 2.6*   ALK PHOS U/L 153* 172* 168* 165*   AST (SGOT) U/L 57* 82* 100* 112*   ALT (SGPT) U/L 35 40 43* 44*     Results from last 7 days   Lab Units 04/23/25  0024 04/22/25  0059 04/21/25  0651 04/21/25  0115   MAGNESIUM mg/dL 1.9 2.2 2.5 1.6     Results from last 7 days   Lab Units 04/22/25  0059 04/21/25  0115   INR  1.08 1.06           Imaging Results (Last 24 Hours)       ** No results found for the last 24 hours. **              Medications:  chlordiazePOXIDE, 50 mg, Oral, Q8H  enoxaparin sodium, 40 mg, Subcutaneous, Q24H  folic acid 1 mg in sodium chloride 0.9 % 50 mL IVPB, 1 mg, Intravenous, Daily  gabapentin, 600 mg, Oral, Nightly  losartan, 50 mg, Oral, Q24H  multivitamin, 1 tablet, Oral, Daily  multivitamin with minerals, 1 tablet, Oral, Daily  mupirocin, 1 Application, Each Nare, BID  pantoprazole, 40 mg, Oral, Q AM  senna-docusate sodium, 2 tablet, Oral, BID  sodium chloride, 10 mL, Intravenous, Q12H  thiamine (B-1) IV, 500 mg, Intravenous, Q8H   Followed by  [START ON 4/24/2025] thiamine (B-1) IV, 200 mg, Intravenous, Q8H   Followed by  [START ON 4/28/2025] thiamine, 100 mg, Oral, Daily  triamcinolone, 1 Application, Topical, BID      dexmedetomidine, 0.2-1.5 mcg/kg/hr (Dosing Weight), Last Rate: 0.5 mcg/kg/hr (04/23/25 1022)            Assessment & Plan   Alcohol use disorder with withdrawal: Concern for withdrawal seizures PTA. Cont scheduled Librium. Precedex added last evening  and will wean as tolerated. Cont CIWA protocol with PRN regimen. Vitamin supplementation. Monitor and replace electrolytes. Safety and seizure precautions. Possible transfer to detox once medically stable. Psych input appreciated.     Withdrawal seizures: Pt reports attempting to self detox at home PTA. No further episodes of seizure activity since admission to our facility. No evidence of seizure activity on EEG. Cont treatment as outlined above. Cont to monitor closely.     Essential HTN: Acute withdrawal likely contributing to elevated BP. Cont losartan and PRN IV hydralazine. Cont to monitor.     Elevated liver enzymes: Likely 2/2 above. Viral hepatitis panel is non-reactive. Abdominal US reveals marked diffuse fatty infiltration of the liver. Improving. Repeat CMP in the AM.     Borderline hypokalemia: K+ improved with supplementation. Mg is 1.9. Cont electrolyte replacement protocols and repeat labs in the AM.     Mild thrombocytopenia: Above likely contributing. Stable today. Repeat CBC in the AM.     GERD: PPI    DVT PPX: Lovenox     Disposition Pending clinical course. Possible detox when medically stable.     Reddy Scruggs DO  04/23/25  13:39 EDT

## 2025-04-23 NOTE — PLAN OF CARE
Goal Outcome Evaluation:           Progress: improving  Outcome Evaluation: Patient vital signs currently stable. RA. Alert and oriented x4. Precedex gtt currently infusing. Family at bedside. Otherwise, no significant changes noted.

## 2025-04-23 NOTE — CASE MANAGEMENT/SOCIAL WORK
Discharge Planning Assessment   [place]     Patient Name: Ruperto Maxwell  MRN: [id number]  Today's Date: 4/23/2025    Admit Date: 4/20/2025     Discharge Plan       Row Name 04/23/25 1525       Plan    Plan Pt was asleep when SS entered room. SS spoke with pt mother at bedside on this date. Family is requesting  detox if possible, but is open to inpatient alcohol treatment if pt would be agreeable. Pt declined alcohol resources earlier this week. SS will follow up with pt tomorrow once pt is more alert. Pt lives at home with significant other. Pt PCP Tereso Dominguez. Pt does not utilize  services or DME. Pt has no POA or living will. SS to follow and assist with discharge planning.          KEVIN BorgesW

## 2025-04-24 LAB
ALBUMIN SERPL-MCNC: 3.6 G/DL (ref 3.5–5.2)
ALBUMIN/GLOB SERPL: 1.1 G/DL
ALP SERPL-CCNC: 151 U/L (ref 39–117)
ALT SERPL W P-5'-P-CCNC: 36 U/L (ref 1–41)
ANION GAP SERPL CALCULATED.3IONS-SCNC: 10.6 MMOL/L (ref 5–15)
AST SERPL-CCNC: 60 U/L (ref 1–40)
BASOPHILS # BLD AUTO: 0.1 10*3/MM3 (ref 0–0.2)
BASOPHILS NFR BLD AUTO: 2 % (ref 0–1.5)
BILIRUB SERPL-MCNC: 1.3 MG/DL (ref 0–1.2)
BUN SERPL-MCNC: 14 MG/DL (ref 6–20)
BUN/CREAT SERPL: 15.9 (ref 7–25)
CALCIUM SPEC-SCNC: 9 MG/DL (ref 8.6–10.5)
CHLORIDE SERPL-SCNC: 105 MMOL/L (ref 98–107)
CO2 SERPL-SCNC: 21.4 MMOL/L (ref 22–29)
CREAT SERPL-MCNC: 0.88 MG/DL (ref 0.76–1.27)
CRP SERPL-MCNC: 0.37 MG/DL (ref 0–0.5)
DEPRECATED RDW RBC AUTO: 59.5 FL (ref 37–54)
EGFRCR SERPLBLD CKD-EPI 2021: 104.1 ML/MIN/1.73
EOSINOPHIL # BLD AUTO: 0.11 10*3/MM3 (ref 0–0.4)
EOSINOPHIL NFR BLD AUTO: 2.2 % (ref 0.3–6.2)
ERYTHROCYTE [DISTWIDTH] IN BLOOD BY AUTOMATED COUNT: 15.2 % (ref 12.3–15.4)
ERYTHROCYTE [SEDIMENTATION RATE] IN BLOOD: 4 MM/HR (ref 0–20)
GLOBULIN UR ELPH-MCNC: 3.3 GM/DL
GLUCOSE SERPL-MCNC: 78 MG/DL (ref 65–99)
HCT VFR BLD AUTO: 42.5 % (ref 37.5–51)
HGB BLD-MCNC: 13.5 G/DL (ref 13–17.7)
IMM GRANULOCYTES # BLD AUTO: 0.02 10*3/MM3 (ref 0–0.05)
IMM GRANULOCYTES NFR BLD AUTO: 0.4 % (ref 0–0.5)
LYMPHOCYTES # BLD AUTO: 1.13 10*3/MM3 (ref 0.7–3.1)
LYMPHOCYTES NFR BLD AUTO: 22.6 % (ref 19.6–45.3)
MAGNESIUM SERPL-MCNC: 1.9 MG/DL (ref 1.6–2.6)
MCH RBC QN AUTO: 33.8 PG (ref 26.6–33)
MCHC RBC AUTO-ENTMCNC: 31.8 G/DL (ref 31.5–35.7)
MCV RBC AUTO: 106.3 FL (ref 79–97)
MONOCYTES # BLD AUTO: 0.6 10*3/MM3 (ref 0.1–0.9)
MONOCYTES NFR BLD AUTO: 12 % (ref 5–12)
NEUTROPHILS NFR BLD AUTO: 3.05 10*3/MM3 (ref 1.7–7)
NEUTROPHILS NFR BLD AUTO: 60.8 % (ref 42.7–76)
NRBC BLD AUTO-RTO: 0 /100 WBC (ref 0–0.2)
PHOSPHATE SERPL-MCNC: 3.6 MG/DL (ref 2.5–4.5)
PLATELET # BLD AUTO: 127 10*3/MM3 (ref 140–450)
PMV BLD AUTO: 11.3 FL (ref 6–12)
POTASSIUM SERPL-SCNC: 3.6 MMOL/L (ref 3.5–5.2)
POTASSIUM SERPL-SCNC: 4.2 MMOL/L (ref 3.5–5.2)
PROT SERPL-MCNC: 6.9 G/DL (ref 6–8.5)
RBC # BLD AUTO: 4 10*6/MM3 (ref 4.14–5.8)
SODIUM SERPL-SCNC: 137 MMOL/L (ref 136–145)
URATE SERPL-MCNC: 5.9 MG/DL (ref 3.4–7)
WBC NRBC COR # BLD AUTO: 5.01 10*3/MM3 (ref 3.4–10.8)

## 2025-04-24 PROCEDURE — 85652 RBC SED RATE AUTOMATED: CPT | Performed by: INTERNAL MEDICINE

## 2025-04-24 PROCEDURE — 25010000002 MAGNESIUM SULFATE 4 GM/100ML SOLUTION: Performed by: INTERNAL MEDICINE

## 2025-04-24 PROCEDURE — 83735 ASSAY OF MAGNESIUM: CPT | Performed by: INTERNAL MEDICINE

## 2025-04-24 PROCEDURE — 25010000002 HYALURONIDASE (HUMAN) 150 UNIT/ML SOLUTION 1 ML VIAL: Performed by: HOSPITALIST

## 2025-04-24 PROCEDURE — 84550 ASSAY OF BLOOD/URIC ACID: CPT | Performed by: INTERNAL MEDICINE

## 2025-04-24 PROCEDURE — 25010000002 FOLIC ACID 5 MG/ML SOLUTION 10 ML VIAL: Performed by: INTERNAL MEDICINE

## 2025-04-24 PROCEDURE — 86140 C-REACTIVE PROTEIN: CPT | Performed by: INTERNAL MEDICINE

## 2025-04-24 PROCEDURE — 85025 COMPLETE CBC W/AUTO DIFF WBC: CPT | Performed by: INTERNAL MEDICINE

## 2025-04-24 PROCEDURE — 99232 SBSQ HOSP IP/OBS MODERATE 35: CPT | Performed by: INTERNAL MEDICINE

## 2025-04-24 PROCEDURE — 25010000002 THIAMINE HCL 200 MG/2ML SOLUTION: Performed by: INTERNAL MEDICINE

## 2025-04-24 PROCEDURE — 84132 ASSAY OF SERUM POTASSIUM: CPT | Performed by: INTERNAL MEDICINE

## 2025-04-24 PROCEDURE — 80053 COMPREHEN METABOLIC PANEL: CPT | Performed by: INTERNAL MEDICINE

## 2025-04-24 PROCEDURE — 25010000002 ENOXAPARIN PER 10 MG: Performed by: INTERNAL MEDICINE

## 2025-04-24 PROCEDURE — 25010000002 DIAZEPAM PER 5 MG: Performed by: INTERNAL MEDICINE

## 2025-04-24 PROCEDURE — 84100 ASSAY OF PHOSPHORUS: CPT | Performed by: INTERNAL MEDICINE

## 2025-04-24 RX ORDER — BENZONATATE 100 MG/1
200 CAPSULE ORAL 3 TIMES DAILY PRN
Status: DISCONTINUED | OUTPATIENT
Start: 2025-04-24 | End: 2025-04-26 | Stop reason: HOSPADM

## 2025-04-24 RX ORDER — MAGNESIUM SULFATE HEPTAHYDRATE 40 MG/ML
4 INJECTION, SOLUTION INTRAVENOUS ONCE
Status: COMPLETED | OUTPATIENT
Start: 2025-04-24 | End: 2025-04-24

## 2025-04-24 RX ORDER — POTASSIUM CHLORIDE 1500 MG/1
40 TABLET, EXTENDED RELEASE ORAL EVERY 4 HOURS
Status: COMPLETED | OUTPATIENT
Start: 2025-04-24 | End: 2025-04-24

## 2025-04-24 RX ADMIN — MAGNESIUM SULFATE HEPTAHYDRATE 4 G: 40 INJECTION, SOLUTION INTRAVENOUS at 03:20

## 2025-04-24 RX ADMIN — GABAPENTIN 600 MG: 300 CAPSULE ORAL at 20:49

## 2025-04-24 RX ADMIN — DIAZEPAM 15 MG: 5 INJECTION INTRAMUSCULAR; INTRAVENOUS at 15:44

## 2025-04-24 RX ADMIN — MUPIROCIN 1 APPLICATION: 20 OINTMENT TOPICAL at 09:42

## 2025-04-24 RX ADMIN — Medication 10 ML: at 20:49

## 2025-04-24 RX ADMIN — CHLORDIAZEPOXIDE HYDROCHLORIDE 50 MG: 25 CAPSULE ORAL at 05:03

## 2025-04-24 RX ADMIN — CHLORDIAZEPOXIDE HYDROCHLORIDE 50 MG: 25 CAPSULE ORAL at 22:09

## 2025-04-24 RX ADMIN — POTASSIUM CHLORIDE 40 MEQ: 1500 TABLET, EXTENDED RELEASE ORAL at 09:41

## 2025-04-24 RX ADMIN — FOLIC ACID 1 MG: 5 INJECTION, SOLUTION INTRAMUSCULAR; INTRAVENOUS; SUBCUTANEOUS at 09:40

## 2025-04-24 RX ADMIN — HYALURONIDASE (HUMAN RECOMBINANT) 150 UNITS: 150 INJECTION, SOLUTION SUBCUTANEOUS at 22:09

## 2025-04-24 RX ADMIN — CHLORDIAZEPOXIDE HYDROCHLORIDE 50 MG: 25 CAPSULE ORAL at 13:25

## 2025-04-24 RX ADMIN — THIAMINE HYDROCHLORIDE 200 MG: 100 INJECTION, SOLUTION INTRAMUSCULAR; INTRAVENOUS at 22:09

## 2025-04-24 RX ADMIN — TRIAMCINOLONE ACETONIDE 1 APPLICATION: 1 CREAM TOPICAL at 20:49

## 2025-04-24 RX ADMIN — Medication 10 ML: at 09:42

## 2025-04-24 RX ADMIN — ENOXAPARIN SODIUM 40 MG: 40 INJECTION SUBCUTANEOUS at 18:12

## 2025-04-24 RX ADMIN — DIAZEPAM 15 MG: 5 INJECTION INTRAMUSCULAR; INTRAVENOUS at 09:55

## 2025-04-24 RX ADMIN — THIAMINE HYDROCHLORIDE 200 MG: 100 INJECTION, SOLUTION INTRAMUSCULAR; INTRAVENOUS at 05:03

## 2025-04-24 RX ADMIN — THIAMINE HYDROCHLORIDE 200 MG: 100 INJECTION, SOLUTION INTRAMUSCULAR; INTRAVENOUS at 13:25

## 2025-04-24 RX ADMIN — BENZONATATE 200 MG: 100 CAPSULE ORAL at 22:09

## 2025-04-24 RX ADMIN — PANTOPRAZOLE SODIUM 40 MG: 40 TABLET, DELAYED RELEASE ORAL at 05:03

## 2025-04-24 RX ADMIN — TRIAMCINOLONE ACETONIDE 1 APPLICATION: 1 CREAM TOPICAL at 09:42

## 2025-04-24 RX ADMIN — MUPIROCIN 1 APPLICATION: 20 OINTMENT TOPICAL at 20:49

## 2025-04-24 RX ADMIN — LOSARTAN POTASSIUM 50 MG: 50 TABLET, FILM COATED ORAL at 09:41

## 2025-04-24 RX ADMIN — DIAZEPAM 10 MG: 5 INJECTION INTRAMUSCULAR; INTRAVENOUS at 01:28

## 2025-04-24 RX ADMIN — POTASSIUM CHLORIDE 40 MEQ: 1500 TABLET, EXTENDED RELEASE ORAL at 03:20

## 2025-04-24 RX ADMIN — MULTIVITAMIN TABLET 1 TABLET: TABLET at 09:41

## 2025-04-24 RX ADMIN — Medication 1 TABLET: at 09:41

## 2025-04-24 NOTE — PLAN OF CARE
Goal Outcome Evaluation:           Progress: improving  Outcome Evaluation: Pt has been alert with intermittent confusion. He is on room air at this time. Patient has been calm off of precedex gtt but reports anxiety. No agitation or agression noted. Patient has been on CIWA protocol this shift. Pt's BP currently stable, pt is currently tachycardic.

## 2025-04-24 NOTE — PROGRESS NOTES
[facility] HOSPITALIST PROGRESS NOTE    Subjective     History:   Ruperto Maxwell is a 49 y.o. male admitted on 4/20/2025 secondary to Seizure due to alcohol withdrawal, uncomplicated     Procedures: None    CC: Follow up alcohol withdrawal    Patient seen and examined with TREMAINE Villa. Awake and alert with his significant other present at bedside. States he is feeling some better. Some intermittent anxiety reported. No reported CP. No reported vomiting. RN working to wean Precedex. Intermittently requiring PRN's per CIWA. Reported witnessed fall today.  No acute events reported.     History taken from: patient, chart, and RN.      Objective     Vital Signs  Temp:  [97.2 °F (36.2 °C)-98.8 °F (37.1 °C)] 98.1 °F (36.7 °C)  Heart Rate:  [62-98] 80  Resp:  [12-22] 19  BP: ()/() 116/96    Intake/Output Summary (Last 24 hours) at 4/24/2025 1611  Last data filed at 4/24/2025 1200  Gross per 24 hour   Intake 2550.44 ml   Output --   Net 2550.44 ml         Physical Exam:  General:    Awake and alert, in no acute distress   Heart:      Normal S1 and S2. Regular rate and rhythm. No significant murmur, rubs or gallops appreciated.   Lungs:     Respirations regular, even and unlabored. Lungs clear to auscultation B/L. No wheezes, rales or rhonchi.   Abdomen:   Soft and nontender. No guarding, rebound tenderness or  organomegaly noted. Bowel sounds present x 4.   Extremities:  No clubbing, cyanosis or edema noted.       Results Review:    Results from last 7 days   Lab Units 04/24/25  0116 04/23/25  0024 04/22/25  0059 04/21/25  0115   WBC 10*3/mm3 5.01 4.00 4.13 3.98   HEMOGLOBIN g/dL 13.5 13.3 14.0 13.5   PLATELETS 10*3/mm3 127* 112* 120* 110*     Results from last 7 days   Lab Units 04/24/25  1100 04/24/25  0116 04/23/25  0820 04/23/25  0024 04/22/25  0059 04/21/25  1528 04/21/25  0651 04/21/25  0115   SODIUM mmol/L  --  137 139 127* 135*  --  135* 135*   POTASSIUM mmol/L 4.2 3.6 3.8 3.7 4.0 4.1 3.6 3.6    CHLORIDE mmol/L  --  105 105 97* 101  --  97* 98   CO2 mmol/L  --  21.4* 19.9* 18.9* 22.2  --  23.4 24.1   BUN mg/dL  --  14 11 11 6  --  7 7   CREATININE mg/dL  --  0.88 0.76 0.90 0.82  --  0.68* 0.67*   CALCIUM mg/dL  --  9.0 9.4 9.3 9.2  --  8.9 9.0   GLUCOSE mg/dL  --  78 131* 127* 105*  --  87 85     Results from last 7 days   Lab Units 04/24/25  0116 04/23/25  0024 04/22/25  0059 04/21/25  0651 04/21/25  0115   BILIRUBIN mg/dL 1.3* 1.8* 2.2* 2.7* 2.6*   ALK PHOS U/L 151* 153* 172* 168* 165*   AST (SGOT) U/L 60* 57* 82* 100* 112*   ALT (SGPT) U/L 36 35 40 43* 44*     Results from last 7 days   Lab Units 04/24/25  0116 04/23/25  0024 04/22/25  0059 04/21/25  0651 04/21/25  0115   MAGNESIUM mg/dL 1.9 1.9 2.2 2.5 1.6     Results from last 7 days   Lab Units 04/22/25  0059 04/21/25  0115   INR  1.08 1.06           Imaging Results (Last 24 Hours)       ** No results found for the last 24 hours. **              Medications:  chlordiazePOXIDE, 50 mg, Oral, Q8H  enoxaparin sodium, 40 mg, Subcutaneous, Q24H  folic acid 1 mg in sodium chloride 0.9 % 50 mL IVPB, 1 mg, Intravenous, Daily  gabapentin, 600 mg, Oral, Nightly  losartan, 50 mg, Oral, Q24H  multivitamin, 1 tablet, Oral, Daily  multivitamin with minerals, 1 tablet, Oral, Daily  mupirocin, 1 Application, Each Nare, BID  pantoprazole, 40 mg, Oral, Q AM  senna-docusate sodium, 2 tablet, Oral, BID  sodium chloride, 10 mL, Intravenous, Q12H  thiamine (B-1) IV, 200 mg, Intravenous, Q8H   Followed by  [START ON 4/28/2025] thiamine, 100 mg, Oral, Daily  triamcinolone, 1 Application, Topical, BID      dexmedetomidine, 0.2-1.5 mcg/kg/hr (Dosing Weight), Last Rate: Stopped (04/24/25 1035)            Assessment & Plan   Alcohol use disorder with withdrawal: Concern for withdrawal seizures PTA. Cont scheduled Librium. Wean Precedex as tolerated. Cont CIWA protocol with PRN regimen. Vitamin supplementation. Monitor and replace electrolytes. Safety and seizure precautions.  Possible transfer to detox once medically stable. Psych input appreciated.     Withdrawal seizures: Pt reports attempting to self detox at home PTA. No further episodes of seizure activity since admission to our facility. No evidence of seizure activity on EEG. Cont treatment as outlined above. Cont to monitor closely.     Essential HTN: Acute withdrawal likely contributing to elevated BP. Improving. Cont losartan and PRN IV hydralazine. Cont to monitor.     Elevated liver enzymes: Likely 2/2 above. Viral hepatitis panel is non-reactive. Abdominal US reveals marked diffuse fatty infiltration of the liver. Improved. Repeat CMP in the AM.     Borderline hypokalemia: K+ low normal and replaced. Mg is 1.9. Cont electrolyte replacement protocols and repeat labs in the AM.     Mild thrombocytopenia: Above likely contributing. Stable today. Repeat CBC in the AM.     GERD: PPI    DVT PPX: Lovenox     Disposition Pending clinical course. Possible detox when medically stable.     Reddy Scruggs,   04/24/25  16:11 EDT

## 2025-04-24 NOTE — CASE MANAGEMENT/SOCIAL WORK
Discharge Planning Assessment   [place]     Patient Name: Ruperto Maxwell  MRN: [id number]  Today's Date: 4/24/2025    Admit Date: 4/20/2025     Discharge Plan       Row Name 04/24/25 1626       Plan    Plan Pt was discussed in MDR's on this date. SS spoke with pt at bedside and discussed alcohol resources and pt declined. SS to follow.               KEVIN BorgesW

## 2025-04-24 NOTE — PLAN OF CARE
Problem: Adult Inpatient Plan of Care  Goal: Plan of Care Review  Outcome: Progressing     Problem: Adult Inpatient Plan of Care  Goal: Patient-Specific Goal (Individualized)  Outcome: Progressing   Goal Outcome Evaluation:            Pt is alert and oriented with intermittent confusion. VSS on Room air. PRN valium given per orders. Potassium and magnesium replaced per protocol. Care on going.

## 2025-04-25 LAB
ALBUMIN SERPL-MCNC: 3.9 G/DL (ref 3.5–5.2)
ALBUMIN/GLOB SERPL: 1.2 G/DL
ALP SERPL-CCNC: 153 U/L (ref 39–117)
ALT SERPL W P-5'-P-CCNC: 39 U/L (ref 1–41)
ANION GAP SERPL CALCULATED.3IONS-SCNC: 8.9 MMOL/L (ref 5–15)
AST SERPL-CCNC: 50 U/L (ref 1–40)
BASOPHILS # BLD AUTO: 0.07 10*3/MM3 (ref 0–0.2)
BASOPHILS NFR BLD AUTO: 1.4 % (ref 0–1.5)
BILIRUB SERPL-MCNC: 1.1 MG/DL (ref 0–1.2)
BUN SERPL-MCNC: 9 MG/DL (ref 6–20)
BUN/CREAT SERPL: 11.4 (ref 7–25)
CALCIUM SPEC-SCNC: 9.4 MG/DL (ref 8.6–10.5)
CHLORIDE SERPL-SCNC: 106 MMOL/L (ref 98–107)
CO2 SERPL-SCNC: 21.1 MMOL/L (ref 22–29)
CREAT SERPL-MCNC: 0.79 MG/DL (ref 0.76–1.27)
DEPRECATED RDW RBC AUTO: 57.7 FL (ref 37–54)
EGFRCR SERPLBLD CKD-EPI 2021: 107.6 ML/MIN/1.73
EOSINOPHIL # BLD AUTO: 0.1 10*3/MM3 (ref 0–0.4)
EOSINOPHIL NFR BLD AUTO: 2 % (ref 0.3–6.2)
ERYTHROCYTE [DISTWIDTH] IN BLOOD BY AUTOMATED COUNT: 15.3 % (ref 12.3–15.4)
GLOBULIN UR ELPH-MCNC: 3.3 GM/DL
GLUCOSE SERPL-MCNC: 99 MG/DL (ref 65–99)
HCT VFR BLD AUTO: 40.9 % (ref 37.5–51)
HGB BLD-MCNC: 13.6 G/DL (ref 13–17.7)
IMM GRANULOCYTES # BLD AUTO: 0.01 10*3/MM3 (ref 0–0.05)
IMM GRANULOCYTES NFR BLD AUTO: 0.2 % (ref 0–0.5)
LYMPHOCYTES # BLD AUTO: 1.66 10*3/MM3 (ref 0.7–3.1)
LYMPHOCYTES NFR BLD AUTO: 33.1 % (ref 19.6–45.3)
MAGNESIUM SERPL-MCNC: 2.1 MG/DL (ref 1.6–2.6)
MCH RBC QN AUTO: 33.8 PG (ref 26.6–33)
MCHC RBC AUTO-ENTMCNC: 33.3 G/DL (ref 31.5–35.7)
MCV RBC AUTO: 101.7 FL (ref 79–97)
MONOCYTES # BLD AUTO: 0.66 10*3/MM3 (ref 0.1–0.9)
MONOCYTES NFR BLD AUTO: 13.2 % (ref 5–12)
NEUTROPHILS NFR BLD AUTO: 2.51 10*3/MM3 (ref 1.7–7)
NEUTROPHILS NFR BLD AUTO: 50.1 % (ref 42.7–76)
NRBC BLD AUTO-RTO: 0 /100 WBC (ref 0–0.2)
PHOSPHATE SERPL-MCNC: 3.4 MG/DL (ref 2.5–4.5)
PLATELET # BLD AUTO: 139 10*3/MM3 (ref 140–450)
PMV BLD AUTO: 11.9 FL (ref 6–12)
POTASSIUM SERPL-SCNC: 4.1 MMOL/L (ref 3.5–5.2)
PROT SERPL-MCNC: 7.2 G/DL (ref 6–8.5)
RBC # BLD AUTO: 4.02 10*6/MM3 (ref 4.14–5.8)
SODIUM SERPL-SCNC: 136 MMOL/L (ref 136–145)
WBC NRBC COR # BLD AUTO: 5.01 10*3/MM3 (ref 3.4–10.8)

## 2025-04-25 PROCEDURE — 25010000002 ENOXAPARIN PER 10 MG: Performed by: INTERNAL MEDICINE

## 2025-04-25 PROCEDURE — 85025 COMPLETE CBC W/AUTO DIFF WBC: CPT | Performed by: INTERNAL MEDICINE

## 2025-04-25 PROCEDURE — 99232 SBSQ HOSP IP/OBS MODERATE 35: CPT | Performed by: INTERNAL MEDICINE

## 2025-04-25 PROCEDURE — 84100 ASSAY OF PHOSPHORUS: CPT | Performed by: INTERNAL MEDICINE

## 2025-04-25 PROCEDURE — 25010000002 THIAMINE HCL 200 MG/2ML SOLUTION: Performed by: INTERNAL MEDICINE

## 2025-04-25 PROCEDURE — 83735 ASSAY OF MAGNESIUM: CPT | Performed by: INTERNAL MEDICINE

## 2025-04-25 PROCEDURE — 25810000003 LACTATED RINGERS SOLUTION: Performed by: INTERNAL MEDICINE

## 2025-04-25 PROCEDURE — 63710000001 ONDANSETRON ODT 4 MG TABLET DISPERSIBLE: Performed by: INTERNAL MEDICINE

## 2025-04-25 PROCEDURE — 25010000002 DIAZEPAM PER 5 MG: Performed by: INTERNAL MEDICINE

## 2025-04-25 PROCEDURE — 80053 COMPREHEN METABOLIC PANEL: CPT | Performed by: INTERNAL MEDICINE

## 2025-04-25 PROCEDURE — 25010000002 FOLIC ACID 5 MG/ML SOLUTION 10 ML VIAL: Performed by: INTERNAL MEDICINE

## 2025-04-25 RX ORDER — FAMOTIDINE 20 MG/1
20 TABLET, FILM COATED ORAL
Status: DISCONTINUED | OUTPATIENT
Start: 2025-04-25 | End: 2025-04-26 | Stop reason: HOSPADM

## 2025-04-25 RX ORDER — CHLORDIAZEPOXIDE HYDROCHLORIDE 25 MG/1
25 CAPSULE, GELATIN COATED ORAL EVERY 8 HOURS SCHEDULED
Status: DISCONTINUED | OUTPATIENT
Start: 2025-04-25 | End: 2025-04-26

## 2025-04-25 RX ORDER — FOLIC ACID 1 MG/1
1 TABLET ORAL DAILY
Status: DISCONTINUED | OUTPATIENT
Start: 2025-04-26 | End: 2025-04-26 | Stop reason: HOSPADM

## 2025-04-25 RX ADMIN — ENOXAPARIN SODIUM 40 MG: 40 INJECTION SUBCUTANEOUS at 17:38

## 2025-04-25 RX ADMIN — TRIAMCINOLONE ACETONIDE 1 APPLICATION: 1 CREAM TOPICAL at 20:54

## 2025-04-25 RX ADMIN — GABAPENTIN 600 MG: 300 CAPSULE ORAL at 20:53

## 2025-04-25 RX ADMIN — CHLORDIAZEPOXIDE HYDROCHLORIDE 25 MG: 25 CAPSULE ORAL at 21:06

## 2025-04-25 RX ADMIN — Medication 10 ML: at 09:45

## 2025-04-25 RX ADMIN — DIAZEPAM 10 MG: 5 TABLET ORAL at 04:19

## 2025-04-25 RX ADMIN — PANTOPRAZOLE SODIUM 40 MG: 40 TABLET, DELAYED RELEASE ORAL at 05:28

## 2025-04-25 RX ADMIN — FAMOTIDINE 20 MG: 20 TABLET, FILM COATED ORAL at 17:38

## 2025-04-25 RX ADMIN — CALCIUM CARBONATE 2 TABLET: 500 TABLET, CHEWABLE ORAL at 10:28

## 2025-04-25 RX ADMIN — BENZONATATE 200 MG: 100 CAPSULE ORAL at 23:03

## 2025-04-25 RX ADMIN — HYDROCODONE BITARTRATE AND ACETAMINOPHEN 1 TABLET: 7.5; 325 TABLET ORAL at 00:36

## 2025-04-25 RX ADMIN — MULTIVITAMIN TABLET 1 TABLET: TABLET at 09:37

## 2025-04-25 RX ADMIN — THIAMINE HYDROCHLORIDE 200 MG: 100 INJECTION, SOLUTION INTRAMUSCULAR; INTRAVENOUS at 14:24

## 2025-04-25 RX ADMIN — Medication 10 ML: at 20:54

## 2025-04-25 RX ADMIN — SENNOSIDES AND DOCUSATE SODIUM 2 TABLET: 50; 8.6 TABLET ORAL at 20:53

## 2025-04-25 RX ADMIN — SODIUM CHLORIDE, POTASSIUM CHLORIDE, SODIUM LACTATE AND CALCIUM CHLORIDE 500 ML: 600; 310; 30; 20 INJECTION, SOLUTION INTRAVENOUS at 12:52

## 2025-04-25 RX ADMIN — THIAMINE HYDROCHLORIDE 200 MG: 100 INJECTION, SOLUTION INTRAMUSCULAR; INTRAVENOUS at 20:55

## 2025-04-25 RX ADMIN — DIAZEPAM 10 MG: 5 INJECTION INTRAMUSCULAR; INTRAVENOUS at 20:54

## 2025-04-25 RX ADMIN — CHLORDIAZEPOXIDE HYDROCHLORIDE 25 MG: 25 CAPSULE ORAL at 14:24

## 2025-04-25 RX ADMIN — FAMOTIDINE 20 MG: 20 TABLET, FILM COATED ORAL at 10:28

## 2025-04-25 RX ADMIN — BENZONATATE 200 MG: 100 CAPSULE ORAL at 15:19

## 2025-04-25 RX ADMIN — FOLIC ACID 1 MG: 5 INJECTION, SOLUTION INTRAMUSCULAR; INTRAVENOUS; SUBCUTANEOUS at 09:38

## 2025-04-25 RX ADMIN — MUPIROCIN 1 APPLICATION: 20 OINTMENT TOPICAL at 09:44

## 2025-04-25 RX ADMIN — Medication 1 TABLET: at 09:37

## 2025-04-25 RX ADMIN — TRIAMCINOLONE ACETONIDE 1 APPLICATION: 1 CREAM TOPICAL at 09:44

## 2025-04-25 RX ADMIN — THIAMINE HYDROCHLORIDE 200 MG: 100 INJECTION, SOLUTION INTRAMUSCULAR; INTRAVENOUS at 05:28

## 2025-04-25 RX ADMIN — ONDANSETRON 4 MG: 4 TABLET, ORALLY DISINTEGRATING ORAL at 10:28

## 2025-04-25 RX ADMIN — CHLORDIAZEPOXIDE HYDROCHLORIDE 50 MG: 25 CAPSULE ORAL at 05:28

## 2025-04-25 NOTE — PROGRESS NOTES
[facility] HOSPITALIST PROGRESS NOTE    Subjective     History:   Ruperto Maxwell is a 49 y.o. male admitted on 4/20/2025 secondary to Seizure due to alcohol withdrawal, uncomplicated     Procedures: None    CC: Follow up alcohol withdrawal    Patient seen and examined with TREMAINE Villa. Awake and alert with his significant other present at bedside. Feeling better but continues to report some anxiety and decreased sleep. No reported CP. No reported vomiting. BP now borderline low.  No acute events reported.     History taken from: patient, chart, and RN.      Objective     Vital Signs  Temp:  [97.4 °F (36.3 °C)-98.5 °F (36.9 °C)] 98.2 °F (36.8 °C)  Heart Rate:  [] 87  Resp:  [10-26] 17  BP: ()/() 116/84    Intake/Output Summary (Last 24 hours) at 4/25/2025 1404  Last data filed at 4/25/2025 0852  Gross per 24 hour   Intake 1015.39 ml   Output --   Net 1015.39 ml         Physical Exam: Unchanged from previous.   General:    Awake and alert, in no acute distress   Heart:      Normal S1 and S2. Regular rate and rhythm. No significant murmur, rubs or gallops appreciated.   Lungs:     Respirations regular, even and unlabored. Lungs clear to auscultation B/L. No wheezes, rales or rhonchi.   Abdomen:   Soft and nontender. No guarding, rebound tenderness or  organomegaly noted. Bowel sounds present x 4.   Extremities:  No clubbing, cyanosis or edema noted.       Results Review:    Results from last 7 days   Lab Units 04/25/25  0131 04/24/25  0116 04/23/25  0024 04/22/25  0059 04/21/25  0115   WBC 10*3/mm3 5.01 5.01 4.00 4.13 3.98   HEMOGLOBIN g/dL 13.6 13.5 13.3 14.0 13.5   PLATELETS 10*3/mm3 139* 127* 112* 120* 110*     Results from last 7 days   Lab Units 04/25/25  0131 04/24/25  1100 04/24/25  0116 04/23/25  0820 04/23/25  0024 04/22/25  0059 04/21/25  1528 04/21/25  0651 04/21/25  0115   SODIUM mmol/L 136  --  137 139 127* 135*  --  135* 135*   POTASSIUM mmol/L 4.1 4.2 3.6 3.8 3.7 4.0 4.1 3.6  3.6   CHLORIDE mmol/L 106  --  105 105 97* 101  --  97* 98   CO2 mmol/L 21.1*  --  21.4* 19.9* 18.9* 22.2  --  23.4 24.1   BUN mg/dL 9  --  14 11 11 6  --  7 7   CREATININE mg/dL 0.79  --  0.88 0.76 0.90 0.82  --  0.68* 0.67*   CALCIUM mg/dL 9.4  --  9.0 9.4 9.3 9.2  --  8.9 9.0   GLUCOSE mg/dL 99  --  78 131* 127* 105*  --  87 85     Results from last 7 days   Lab Units 04/25/25  0131 04/24/25  0116 04/23/25  0024 04/22/25  0059 04/21/25  0651 04/21/25  0115   BILIRUBIN mg/dL 1.1 1.3* 1.8* 2.2* 2.7* 2.6*   ALK PHOS U/L 153* 151* 153* 172* 168* 165*   AST (SGOT) U/L 50* 60* 57* 82* 100* 112*   ALT (SGPT) U/L 39 36 35 40 43* 44*     Results from last 7 days   Lab Units 04/25/25  0131 04/24/25  0116 04/23/25  0024 04/22/25  0059 04/21/25  0651 04/21/25  0115   MAGNESIUM mg/dL 2.1 1.9 1.9 2.2 2.5 1.6     Results from last 7 days   Lab Units 04/22/25  0059 04/21/25  0115   INR  1.08 1.06           Imaging Results (Last 24 Hours)       ** No results found for the last 24 hours. **              Medications:  chlordiazePOXIDE, 25 mg, Oral, Q8H  enoxaparin sodium, 40 mg, Subcutaneous, Q24H  famotidine, 20 mg, Oral, BID AC  folic acid 1 mg in sodium chloride 0.9 % 50 mL IVPB, 1 mg, Intravenous, Daily  gabapentin, 600 mg, Oral, Nightly  multivitamin, 1 tablet, Oral, Daily  multivitamin with minerals, 1 tablet, Oral, Daily  mupirocin, 1 Application, Each Nare, BID  pantoprazole, 40 mg, Oral, Q AM  senna-docusate sodium, 2 tablet, Oral, BID  sodium chloride, 10 mL, Intravenous, Q12H  thiamine (B-1) IV, 200 mg, Intravenous, Q8H   Followed by  [START ON 4/28/2025] thiamine, 100 mg, Oral, Daily  triamcinolone, 1 Application, Topical, BID      dexmedetomidine, 0.2-1.5 mcg/kg/hr (Dosing Weight), Last Rate: Stopped (04/24/25 1035)            Assessment & Plan   Alcohol use disorder with withdrawal: Concern for withdrawal seizures PTA. Weaned off Precedex. Begin to taper scheduled Librium. Cont CIWA protocol with PRN regimen. Vitamin  supplementation. Monitor and replace electrolytes. Safety and seizure precautions. Psych input appreciated.     Withdrawal seizures: Pt reports attempting to self detox at home PTA. No further episodes of seizure activity since admission to our facility. No evidence of seizure activity on EEG. Cont treatment as outlined above. Cont to monitor closely.     Essential HTN: Acute withdrawal likely contributing to elevated BP. Improved with borderline hypotension today. Stop losartan. Cont PRN IV hydralazine. Cont to monitor.     Elevated liver enzymes: Likely 2/2 above. Viral hepatitis panel is non-reactive. Abdominal US reveals marked diffuse fatty infiltration of the liver. Improved. Repeat CMP in the AM.     Borderline hypokalemia: K+ improved with supplementation. Mg now>2 with supplementation. Cont electrolyte replacement protocols and repeat labs in the AM.     Mild thrombocytopenia: Above likely contributing. Stable today. Repeat CBC in the AM.     GERD: PPI    DVT PPX: Lovenox     Disposition Pt states he is not interested in rehab. Likely home when medically stable, possibly 1-2 days.     Reddy Scruggs,   04/25/25  14:04 EDT

## 2025-04-25 NOTE — PLAN OF CARE
Goal Outcome Evaluation:  Plan of Care Reviewed With: patient        Progress: improving  Outcome Evaluation: Pt has been Alert & oriented x4 upon assessments. He has been on room air this shift. Patient remained off precedex gtt this shift and appears calm and cooperative but reports intermittent anxiety. Patient CIWA scores lower than previous shift(s), pt currently sinus rhythm and BPs improved at this time. Patient did have hypotensive episode earlier this shift.

## 2025-04-25 NOTE — PLAN OF CARE
Problem: Adult Inpatient Plan of Care  Goal: Plan of Care Review  Outcome: Progressing     Problem: Adult Inpatient Plan of Care  Goal: Patient-Specific Goal (Individualized)  Outcome: Progressing   Goal Outcome Evaluation:            Pt alert and oriented with intermittent confusion, VSS, on room air. Plan of care ongoing.

## 2025-04-25 NOTE — CASE MANAGEMENT/SOCIAL WORK
Discharge Planning Assessment   [place]     Patient Name: Ruperto Maxwell  MRN: [id number]  Today's Date: 4/25/2025    Admit Date: 4/20/2025     Discharge Plan       Row Name 04/25/25 1546       Plan    Plan Pt lives with significant other and he plans to return home at discharge. Pt does not utilize home health services or DME. Pt declines alcohol resources. SS to follow.                    Expected Discharge Date and Time       Expected Discharge Date Expected Discharge Time    Apr 28, 2025         YOLANDA Ziegler

## 2025-04-26 VITALS
TEMPERATURE: 97.8 F | BODY MASS INDEX: 27.47 KG/M2 | RESPIRATION RATE: 12 BRPM | OXYGEN SATURATION: 100 % | HEIGHT: 72 IN | SYSTOLIC BLOOD PRESSURE: 105 MMHG | WEIGHT: 202.82 LBS | HEART RATE: 99 BPM | DIASTOLIC BLOOD PRESSURE: 99 MMHG

## 2025-04-26 LAB
ALBUMIN SERPL-MCNC: 3.9 G/DL (ref 3.5–5.2)
ALBUMIN/GLOB SERPL: 1.2 G/DL
ALP SERPL-CCNC: 135 U/L (ref 39–117)
ALT SERPL W P-5'-P-CCNC: 38 U/L (ref 1–41)
ANION GAP SERPL CALCULATED.3IONS-SCNC: 9.1 MMOL/L (ref 5–15)
AST SERPL-CCNC: 52 U/L (ref 1–40)
BASOPHILS # BLD AUTO: 0.12 10*3/MM3 (ref 0–0.2)
BASOPHILS NFR BLD AUTO: 3 % (ref 0–1.5)
BILIRUB SERPL-MCNC: 0.9 MG/DL (ref 0–1.2)
BUN SERPL-MCNC: 10 MG/DL (ref 6–20)
BUN/CREAT SERPL: 11.2 (ref 7–25)
CALCIUM SPEC-SCNC: 9.3 MG/DL (ref 8.6–10.5)
CHLORIDE SERPL-SCNC: 105 MMOL/L (ref 98–107)
CO2 SERPL-SCNC: 22.9 MMOL/L (ref 22–29)
CREAT SERPL-MCNC: 0.89 MG/DL (ref 0.76–1.27)
DEPRECATED RDW RBC AUTO: 58 FL (ref 37–54)
EGFRCR SERPLBLD CKD-EPI 2021: 103.8 ML/MIN/1.73
EOSINOPHIL # BLD AUTO: 0.16 10*3/MM3 (ref 0–0.4)
EOSINOPHIL NFR BLD AUTO: 4 % (ref 0.3–6.2)
ERYTHROCYTE [DISTWIDTH] IN BLOOD BY AUTOMATED COUNT: 15 % (ref 12.3–15.4)
GLOBULIN UR ELPH-MCNC: 3.2 GM/DL
GLUCOSE SERPL-MCNC: 101 MG/DL (ref 65–99)
HCT VFR BLD AUTO: 41.3 % (ref 37.5–51)
HGB BLD-MCNC: 13.4 G/DL (ref 13–17.7)
IMM GRANULOCYTES # BLD AUTO: 0.01 10*3/MM3 (ref 0–0.05)
IMM GRANULOCYTES NFR BLD AUTO: 0.3 % (ref 0–0.5)
LYMPHOCYTES # BLD AUTO: 1.45 10*3/MM3 (ref 0.7–3.1)
LYMPHOCYTES NFR BLD AUTO: 36.3 % (ref 19.6–45.3)
MCH RBC QN AUTO: 33.7 PG (ref 26.6–33)
MCHC RBC AUTO-ENTMCNC: 32.4 G/DL (ref 31.5–35.7)
MCV RBC AUTO: 103.8 FL (ref 79–97)
MONOCYTES # BLD AUTO: 0.64 10*3/MM3 (ref 0.1–0.9)
MONOCYTES NFR BLD AUTO: 16 % (ref 5–12)
NEUTROPHILS NFR BLD AUTO: 1.61 10*3/MM3 (ref 1.7–7)
NEUTROPHILS NFR BLD AUTO: 40.4 % (ref 42.7–76)
NRBC BLD AUTO-RTO: 0 /100 WBC (ref 0–0.2)
PLATELET # BLD AUTO: 133 10*3/MM3 (ref 140–450)
PMV BLD AUTO: 11.6 FL (ref 6–12)
POTASSIUM SERPL-SCNC: 4.4 MMOL/L (ref 3.5–5.2)
PROT SERPL-MCNC: 7.1 G/DL (ref 6–8.5)
RBC # BLD AUTO: 3.98 10*6/MM3 (ref 4.14–5.8)
SODIUM SERPL-SCNC: 137 MMOL/L (ref 136–145)
WBC NRBC COR # BLD AUTO: 3.99 10*3/MM3 (ref 3.4–10.8)

## 2025-04-26 PROCEDURE — 25010000002 DIAZEPAM PER 5 MG: Performed by: INTERNAL MEDICINE

## 2025-04-26 PROCEDURE — 85025 COMPLETE CBC W/AUTO DIFF WBC: CPT | Performed by: INTERNAL MEDICINE

## 2025-04-26 PROCEDURE — 99239 HOSP IP/OBS DSCHRG MGMT >30: CPT | Performed by: INTERNAL MEDICINE

## 2025-04-26 PROCEDURE — 25010000002 THIAMINE HCL 200 MG/2ML SOLUTION: Performed by: INTERNAL MEDICINE

## 2025-04-26 PROCEDURE — 80053 COMPREHEN METABOLIC PANEL: CPT | Performed by: INTERNAL MEDICINE

## 2025-04-26 RX ORDER — CHLORDIAZEPOXIDE HYDROCHLORIDE 25 MG/1
50 CAPSULE, GELATIN COATED ORAL EVERY 8 HOURS SCHEDULED
Status: DISCONTINUED | OUTPATIENT
Start: 2025-04-26 | End: 2025-04-26 | Stop reason: HOSPADM

## 2025-04-26 RX ADMIN — DIAZEPAM 10 MG: 5 INJECTION INTRAMUSCULAR; INTRAVENOUS at 03:12

## 2025-04-26 RX ADMIN — THIAMINE HYDROCHLORIDE 200 MG: 100 INJECTION, SOLUTION INTRAMUSCULAR; INTRAVENOUS at 05:59

## 2025-04-26 RX ADMIN — Medication 10 ML: at 09:04

## 2025-04-26 RX ADMIN — Medication 1 TABLET: at 08:58

## 2025-04-26 RX ADMIN — FAMOTIDINE 20 MG: 20 TABLET, FILM COATED ORAL at 08:58

## 2025-04-26 RX ADMIN — DIAZEPAM 10 MG: 5 INJECTION INTRAMUSCULAR; INTRAVENOUS at 01:03

## 2025-04-26 RX ADMIN — FOLIC ACID 1 MG: 1 TABLET ORAL at 08:58

## 2025-04-26 RX ADMIN — MULTIVITAMIN TABLET 1 TABLET: TABLET at 08:58

## 2025-04-26 RX ADMIN — TRIAMCINOLONE ACETONIDE 1 APPLICATION: 1 CREAM TOPICAL at 09:04

## 2025-04-26 RX ADMIN — CHLORDIAZEPOXIDE HYDROCHLORIDE 25 MG: 25 CAPSULE ORAL at 05:59

## 2025-04-26 NOTE — PLAN OF CARE
Problem: Adult Inpatient Plan of Care  Goal: Plan of Care Review  Outcome: Progressing     Problem: Adult Inpatient Plan of Care  Goal: Patient-Specific Goal (Individualized)  Outcome: Progressing   Goal Outcome Evaluation:      Pt. is alert and oriented with intermittent confusion, VSS, on room air/ 2L NC. Pt. Has been agitated and noncompliant for most of this shift, aggressive towards bed alarm and staff assistance, security called on 3 occasions. PRN valium given per CIWA protocol. Currently resting in bed with girlfriend at bedside. Plan of care ongoing.

## 2025-04-26 NOTE — DISCHARGE SUMMARY
[facility] DISCHARGE SUMMARY      Date of Admission: 4/20/2025    Date of Discharge:  4/26/2025    PCP: Tereso Dominguez APRN    Admission Diagnosis:   Please see admission H&P    Discharge Diagnosis:   Alcohol use disorder with withdrawal  Withdrawal seizures  Elevated BP  Elevated liver enzymes  Borderline hypokalemia  Mild hypomagnesemia   Mild thrombocytopenia     Procedures Performed:  None     Consults:   Consults       Date and Time Order Name Status Description    4/21/2025  7:27 AM Inpatient Psychiatrist Consult Completed               History of Present Illness:  Ruperto Maxwell is a 49 y.o. male who presented to [facility] as a transfer from OSH for evaluation and treatment of alcohol withdrawal with reported withdrawal seizures. CT head at OSH was negative for any acute abnormalities. He was given Keppra and Ativan at OSH. Please see admission H&P for complete details.      Hospital Course  Ruperto Maxwell was admitted to the CCU for further evaluation and treatment. He was placed on scheduled Librium in addition to PRN benzodiazepines per CIWA and vitamin supplementation in addition to safety and seizure precautions. Psychiatry was consulted for further input.     Electrolytes were monitored and replaced per protocol. No seizure activity was appreciated clinically with no evidence of seizure activity on EEG. His liver enzymes were noted to be elevated but did improve during his hospitalization. Viral hepatitis panel was non-reactive with US revealing diffuse fatty infiltration of the liver. He remained clinically stable on admission and was transferred to the PCU. His BP trended upward with acute withdrawal likely contributing. He began experiencing increased withdrawal symptoms despite Librium and PRN regimen requiring addition of Precedex. He experienced some intermittent agitation and wished to leave AMA at one point earlier in his hospitalization. However, it was not felt he had the capacity to  sign out and after some discussion he decided to remain hospitalized with improvement in his agitation. Over the course of a few days, his mentation did improve and he was weaned off the Precedex. Librium was initially tapered but he began experiencing some worsening withdrawal symptoms and the dose of Librium was increased. He developed some generalized weakness and experienced a fall while hospitalized. Possible transfer to inpatient detox and possible substance abuse programs were discussed but he politely declined. He was reevaluated on 4/26 and again wished to leave AMA. He was oriented during my encounter and risks of leaving AMA were discussed. He was encouraged to remain hospitalized but ultimately decided to sign out AMA.       Condition on Discharge:  Guarded as pt left AMA.     Vital Signs  Vitals:    04/26/25 0900   BP: 105/99   Pulse: 99   Resp: 12   Temp:    SpO2: 100%       Physical Exam:  General:    Awake, alert, in no acute distress   Heart:      Normal S1 and S2. Regular rate and rhythm. No significant murmur, rubs or gallops appreciated.   Lungs:     Respirations regular, even and unlabored. Lungs clear to auscultation B/L. No wheezes, rales or rhonchi.   Abdomen:   Soft and nontender. No guarding, rebound tenderness or  organomegaly noted. Bowel sounds present x 4.   Extremities:  No clubbing, cyanosis or edema noted. Moves UE and LE equally B/L.     Discharge Disposition: Pt left AMA.       Discharge Medications:  Not provided as pt left AMA.         Discharge Diet:   Dietary Orders (From admission, onward)       Start     Ordered    04/21/25 0045  Diet: Regular/House; Fluid Consistency: Thin (IDDSI 0)  Diet Effective Now        References:    Diet Order Definitions   Question Answer Comment   Diets: Regular/House    Fluid Consistency: Thin (IDDSI 0)        04/21/25 0045                    Activity at Discharge:  activity as tolerated    Follow-up Appointments:   Follow-up Information        Tereso Dominguez, SHANNEN .    Specialty: Family Medicine  Contact information:  [address]  [address]  [place] [address]  [phone]                                 Test Results Pending at Discharge:       The 10-year ASCVD risk score (Tanja FRANCO, et al., 2019) is: 2.8%    Values used to calculate the score:      Age: 49 years      Sex: Male      Is Non- : No      Diabetic: No      Tobacco smoker: No      Systolic Blood Pressure: 105 mmHg      Is BP treated: No      HDL Cholesterol: 52 mg/dL      Total Cholesterol: 211 mg/dL      Reddy Scruggs DO  04/26/25  10:02 EDT      Time: Greater than 30 minutes spent on this discharge.

## 2025-04-26 NOTE — NURSING NOTE
0200 pill was found with patient in bed, identified as hydrocodone, not the dose he is receiving here, pt stated he retrieved it out of his wallet. Security called to retrieve pill. Situation explained to pt and family at bedside.

## 2025-04-26 NOTE — NURSING NOTE
Patient requesting to leave AMA. Explained the risks involved with leaving AMA to the patient and significant other. Patient and patient's significant other verbalized understanding.Dr. Scruggs made aware. Dr. Scruggs examined the patient and explain the medical risks involved with leaving AMA, patient and patient's significant other verbalized understanding. AMA formed explain and risks explained and signed by patient.  20 G IV removed from left forearm and 20 G IV removed from left wrist. No acute distress noted. Patient transport called to assist patient to car.

## 2025-04-27 NOTE — PAYOR COMM NOTE
"[facility]  ERLINDA DOMINGUEZ  PHONE  [phone]  FAX  [phone]  NPI:  [id number]    PATIENT D/C 4/26/2025    Ignacio Alexander (49 y.o. Male)       Date of Birth   1975    Social Security Number       Address   [address] [place] [address]    Home Phone   [phone]    MRN   [id number]       [place]   [facility]    Marital Status                               Admission Date   4/20/2025    Admission Type   Urgent    Admitting Provider   Arnol Guan MD    Attending Provider       Department, Room/Bed   [facility] PROGRESS CARE, P220/1P       Discharge Date   4/26/2025    Discharge Disposition   Left Against Medical Advice    Discharge Destination                                 Attending Provider: (none)   Allergies: No Known Allergies    Isolation: None   Infection: None   Code Status: Prior    Ht: 182.9 cm (72\")   Wt: 92 kg (202 lb 13.2 oz)    Admission Cmt: None   Principal Problem: Seizure due to alcohol withdrawal, uncomplicated [F10.930,R56.9]                   Active Insurance as of 4/20/2025       Primary Coverage       Payor Plan Insurance Group Employer/Plan Group    [employer] [place] [employer] [place]ICA[place]        Payor Plan Address Payor Plan Phone Number Payor Plan Fax Number Effective Dates    [address] [address] [phone]  3/3/2023 - None Entered    [facility] [id number]         Subscriber Name Subscriber Birth Date Member ID       IGNACIO ALEXANDER 1975 [id number]                     Emergency Contacts        (Rel.) Home Phone Work Phone Mobile Phone    Tracee Alexander (Mother) [phone] -- --    JUAN BRAXTON (Friend) -- -- [phone]                 Discharge Summary        Reddy Scruggs DO at 04/26/25 1002              [facility] DISCHARGE SUMMARY      Date of Admission: 4/20/2025    Date of Discharge:  4/26/2025    PCP: Tereso Dominguez APRN    Admission Diagnosis:   Please see admission H&P    Discharge " Diagnosis:   Alcohol use disorder with withdrawal  Withdrawal seizures  Elevated BP  Elevated liver enzymes  Borderline hypokalemia  Mild hypomagnesemia   Mild thrombocytopenia     Procedures Performed:  None     Consults:   Consults       Date and Time Order Name Status Description    4/21/2025  7:27 AM Inpatient Psychiatrist Consult Completed               History of Present Illness:  Ruperto Maxwell is a 49 y.o. male who presented to [facility] as a transfer from OSH for evaluation and treatment of alcohol withdrawal with reported withdrawal seizures. CT head at OSH was negative for any acute abnormalities. He was given Keppra and Ativan at OSH. Please see admission H&P for complete details.      Hospital Course  Ruperto Maxwell was admitted to the CCU for further evaluation and treatment. He was placed on scheduled Librium in addition to PRN benzodiazepines per CIWA and vitamin supplementation in addition to safety and seizure precautions. Psychiatry was consulted for further input.     Electrolytes were monitored and replaced per protocol. No seizure activity was appreciated clinically with no evidence of seizure activity on EEG. His liver enzymes were noted to be elevated but did improve during his hospitalization. Viral hepatitis panel was non-reactive with US revealing diffuse fatty infiltration of the liver. He remained clinically stable on admission and was transferred to the PCU. His BP trended upward with acute withdrawal likely contributing. He began experiencing increased withdrawal symptoms despite Librium and PRN regimen requiring addition of Precedex. He experienced some intermittent agitation and wished to leave AMA at one point earlier in his hospitalization. However, it was not felt he had the capacity to sign out and after some discussion he decided to remain hospitalized with improvement in his agitation. Over the course of a few days, his mentation did improve and he was weaned off the Precedex. Librium was  initially tapered but he began experiencing some worsening withdrawal symptoms and the dose of Librium was increased. He developed some generalized weakness and experienced a fall while hospitalized. Possible transfer to inpatient detox and possible substance abuse programs were discussed but he politely declined. He was reevaluated on 4/26 and again wished to leave AMA. He was oriented during my encounter and risks of leaving AMA were discussed. He was encouraged to remain hospitalized but ultimately decided to sign out AMA.       Condition on Discharge:  Guarded as pt left AMA.     Vital Signs  Vitals:    04/26/25 0900   BP: 105/99   Pulse: 99   Resp: 12   Temp:    SpO2: 100%       Physical Exam:  General:    Awake, alert, in no acute distress   Heart:      Normal S1 and S2. Regular rate and rhythm. No significant murmur, rubs or gallops appreciated.   Lungs:     Respirations regular, even and unlabored. Lungs clear to auscultation B/L. No wheezes, rales or rhonchi.   Abdomen:   Soft and nontender. No guarding, rebound tenderness or  organomegaly noted. Bowel sounds present x 4.   Extremities:  No clubbing, cyanosis or edema noted. Moves UE and LE equally B/L.     Discharge Disposition: Pt left AMA.       Discharge Medications:  Not provided as pt left AMA.         Discharge Diet:   Dietary Orders (From admission, onward)       Start     Ordered    04/21/25 0045  Diet: Regular/House; Fluid Consistency: Thin (IDDSI 0)  Diet Effective Now        References:    Diet Order Definitions   Question Answer Comment   Diets: Regular/House    Fluid Consistency: Thin (IDDSI 0)        04/21/25 0045                    Activity at Discharge:  activity as tolerated    Follow-up Appointments:   Follow-up Information       Tereso Dominguez APRN .    Specialty: Family Medicine  Contact information:  [address] [place] [id number] [phone]                                 Test Results Pending at Discharge:        The 10-year ASCVD risk score (Tanja FRANCO, et al., 2019) is: 2.8%    Values used to calculate the score:      Age: 49 years      Sex: Male      Is Non- : No      Diabetic: No      Tobacco smoker: No      Systolic Blood Pressure: 105 mmHg      Is BP treated: No      HDL Cholesterol: 52 mg/dL      Total Cholesterol: 211 mg/dL      Reddy Scruggs DO  04/26/25  10:02 EDT      Time: Greater than 30 minutes spent on this discharge.           Electronically signed by Reddy Scruggs DO at 04/26/25 2836

## 2025-04-28 ENCOUNTER — TELEPHONE (OUTPATIENT)
Dept: TELEMETRY | Facility: HOSPITAL | Age: 50
End: 2025-04-28
Payer: COMMERCIAL

## 2025-05-17 ENCOUNTER — APPOINTMENT (OUTPATIENT)
Dept: GENERAL RADIOLOGY | Facility: HOSPITAL | Age: 50
End: 2025-05-17
Payer: COMMERCIAL

## 2025-05-17 ENCOUNTER — HOSPITAL ENCOUNTER (EMERGENCY)
Facility: HOSPITAL | Age: 50
Discharge: HOME | End: 2025-05-17
Payer: COMMERCIAL

## 2025-05-17 VITALS
DIASTOLIC BLOOD PRESSURE: 76 MMHG | WEIGHT: 200 LBS | HEIGHT: 72 IN | HEART RATE: 90 BPM | TEMPERATURE: 98.4 F | BODY MASS INDEX: 27.09 KG/M2 | RESPIRATION RATE: 18 BRPM | SYSTOLIC BLOOD PRESSURE: 134 MMHG | OXYGEN SATURATION: 99 %

## 2025-05-17 DIAGNOSIS — S72.415A CLOSED NONDISPLACED FRACTURE OF CONDYLE OF LEFT FEMUR, INITIAL ENCOUNTER: ICD-10-CM

## 2025-05-17 DIAGNOSIS — S42.401A CLOSED FRACTURE OF RIGHT ELBOW, INITIAL ENCOUNTER: Primary | ICD-10-CM

## 2025-05-17 PROCEDURE — 73590 X-RAY EXAM OF LOWER LEG: CPT

## 2025-05-17 PROCEDURE — 73610 X-RAY EXAM OF ANKLE: CPT

## 2025-05-17 PROCEDURE — 73610 X-RAY EXAM OF ANKLE: CPT | Performed by: RADIOLOGY

## 2025-05-17 PROCEDURE — 73590 X-RAY EXAM OF LOWER LEG: CPT | Performed by: RADIOLOGY

## 2025-05-17 PROCEDURE — 73630 X-RAY EXAM OF FOOT: CPT

## 2025-05-17 PROCEDURE — 99283 EMERGENCY DEPT VISIT LOW MDM: CPT

## 2025-05-17 PROCEDURE — 73080 X-RAY EXAM OF ELBOW: CPT

## 2025-05-17 PROCEDURE — 73562 X-RAY EXAM OF KNEE 3: CPT | Performed by: RADIOLOGY

## 2025-05-17 PROCEDURE — 73562 X-RAY EXAM OF KNEE 3: CPT

## 2025-05-17 PROCEDURE — 73630 X-RAY EXAM OF FOOT: CPT | Performed by: RADIOLOGY

## 2025-05-17 PROCEDURE — 73080 X-RAY EXAM OF ELBOW: CPT | Performed by: RADIOLOGY

## 2025-05-17 RX ORDER — OXYCODONE AND ACETAMINOPHEN 5; 325 MG/1; MG/1
1 TABLET ORAL EVERY 6 HOURS PRN
Qty: 8 TABLET | Refills: 0 | Status: SHIPPED | OUTPATIENT
Start: 2025-05-17 | End: 2025-05-19
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
## 2025-07-17 ENCOUNTER — TRANSCRIBE ORDERS (OUTPATIENT)
Dept: ADMINISTRATIVE | Facility: HOSPITAL | Age: 50
End: 2025-07-17
Payer: COMMERCIAL

## 2025-07-17 DIAGNOSIS — R13.10 DYSPHAGIA, UNSPECIFIED TYPE: Primary | ICD-10-CM

## 2025-08-10 ENCOUNTER — APPOINTMENT (OUTPATIENT)
Dept: CT IMAGING | Facility: HOSPITAL | Age: 50
End: 2025-08-10
Payer: COMMERCIAL

## 2025-08-10 ENCOUNTER — HOSPITAL ENCOUNTER (EMERGENCY)
Facility: HOSPITAL | Age: 50
Discharge: HOME | End: 2025-08-10
Payer: COMMERCIAL

## 2025-08-10 VITALS
WEIGHT: 186 LBS | SYSTOLIC BLOOD PRESSURE: 130 MMHG | BODY MASS INDEX: 25.19 KG/M2 | OXYGEN SATURATION: 99 % | HEIGHT: 72 IN | DIASTOLIC BLOOD PRESSURE: 85 MMHG | RESPIRATION RATE: 12 BRPM | TEMPERATURE: 98.6 F | HEART RATE: 79 BPM

## 2025-08-10 DIAGNOSIS — Z79.899 POLYPHARMACY: Primary | ICD-10-CM

## 2025-08-10 DIAGNOSIS — R53.1 GENERALIZED WEAKNESS: ICD-10-CM

## 2025-08-10 LAB
ALBUMIN SERPL-MCNC: 3.7 G/DL (ref 3.5–5.2)
ALBUMIN/GLOB SERPL: 1.3 G/DL
ALP SERPL-CCNC: 179 U/L (ref 39–117)
ALT SERPL W P-5'-P-CCNC: 7 U/L (ref 1–41)
AMPHET+METHAMPHET UR QL: NEGATIVE
AMPHETAMINES UR QL: NEGATIVE
ANION GAP SERPL CALCULATED.3IONS-SCNC: 10.5 MMOL/L (ref 5–15)
APAP SERPL-MCNC: <5 MCG/ML (ref 0–30)
AST SERPL-CCNC: 19 U/L (ref 1–40)
B PARAPERT DNA SPEC QL NAA+PROBE: NOT DETECTED
B PERT DNA SPEC QL NAA+PROBE: NOT DETECTED
BACTERIA UR QL AUTO: ABNORMAL /HPF
BARBITURATES UR QL SCN: NEGATIVE
BASOPHILS # BLD AUTO: 0.03 10*3/MM3 (ref 0–0.2)
BASOPHILS NFR BLD AUTO: 0.6 % (ref 0–1.5)
BENZODIAZ UR QL SCN: POSITIVE
BILIRUB SERPL-MCNC: 0.5 MG/DL (ref 0–1.2)
BILIRUB UR QL STRIP: NEGATIVE
BUN SERPL-MCNC: 8.9 MG/DL (ref 6–20)
BUN/CREAT SERPL: 15.1 (ref 7–25)
BUPRENORPHINE SERPL-MCNC: NEGATIVE NG/ML
C PNEUM DNA NPH QL NAA+NON-PROBE: NOT DETECTED
CALCIUM SPEC-SCNC: 9 MG/DL (ref 8.6–10.5)
CANNABINOIDS SERPL QL: NEGATIVE
CHLORIDE SERPL-SCNC: 107 MMOL/L (ref 98–107)
CLARITY UR: CLEAR
CO2 SERPL-SCNC: 24.5 MMOL/L (ref 22–29)
COCAINE UR QL: NEGATIVE
COLOR UR: ABNORMAL
CREAT SERPL-MCNC: 0.59 MG/DL (ref 0.76–1.27)
CRP SERPL-MCNC: 0.78 MG/DL (ref 0–0.5)
D-LACTATE SERPL-SCNC: 1.2 MMOL/L (ref 0.5–2)
DEPRECATED RDW RBC AUTO: 42.7 FL (ref 37–54)
EGFRCR SERPLBLD CKD-EPI 2021: 117.5 ML/MIN/1.73
EOSINOPHIL # BLD AUTO: 0.12 10*3/MM3 (ref 0–0.4)
EOSINOPHIL NFR BLD AUTO: 2.4 % (ref 0.3–6.2)
ERYTHROCYTE [DISTWIDTH] IN BLOOD BY AUTOMATED COUNT: 12.7 % (ref 12.3–15.4)
ETHANOL BLD-MCNC: <10 MG/DL (ref 0–10)
ETHANOL UR QL: <0.01 %
FENTANYL UR-MCNC: NEGATIVE NG/ML
FLUAV SUBTYP SPEC NAA+PROBE: NOT DETECTED
FLUBV RNA NPH QL NAA+NON-PROBE: NOT DETECTED
GLOBULIN UR ELPH-MCNC: 2.8 GM/DL
GLUCOSE SERPL-MCNC: 90 MG/DL (ref 65–99)
GLUCOSE UR STRIP-MCNC: NEGATIVE MG/DL
HADV DNA SPEC NAA+PROBE: NOT DETECTED
HCOV 229E RNA SPEC QL NAA+PROBE: NOT DETECTED
HCOV HKU1 RNA SPEC QL NAA+PROBE: NOT DETECTED
HCOV NL63 RNA SPEC QL NAA+PROBE: NOT DETECTED
HCOV OC43 RNA SPEC QL NAA+PROBE: NOT DETECTED
HCT VFR BLD AUTO: 36.1 % (ref 37.5–51)
HGB BLD-MCNC: 12 G/DL (ref 13–17.7)
HGB UR QL STRIP.AUTO: NEGATIVE
HMPV RNA NPH QL NAA+NON-PROBE: NOT DETECTED
HOLD SPECIMEN: NORMAL
HOLD SPECIMEN: NORMAL
HPIV1 RNA ISLT QL NAA+PROBE: NOT DETECTED
HPIV2 RNA SPEC QL NAA+PROBE: NOT DETECTED
HPIV3 RNA NPH QL NAA+PROBE: NOT DETECTED
HPIV4 P GENE NPH QL NAA+PROBE: NOT DETECTED
HYALINE CASTS UR QL AUTO: ABNORMAL /LPF
IMM GRANULOCYTES # BLD AUTO: 0.01 10*3/MM3 (ref 0–0.05)
IMM GRANULOCYTES NFR BLD AUTO: 0.2 % (ref 0–0.5)
KETONES UR QL STRIP: ABNORMAL
LEUKOCYTE ESTERASE UR QL STRIP.AUTO: ABNORMAL
LYMPHOCYTES # BLD AUTO: 1.22 10*3/MM3 (ref 0.7–3.1)
LYMPHOCYTES NFR BLD AUTO: 24.6 % (ref 19.6–45.3)
M PNEUMO IGG SER IA-ACNC: NOT DETECTED
MAGNESIUM SERPL-MCNC: 1.8 MG/DL (ref 1.6–2.6)
MCH RBC QN AUTO: 30.7 PG (ref 26.6–33)
MCHC RBC AUTO-ENTMCNC: 33.2 G/DL (ref 31.5–35.7)
MCV RBC AUTO: 92.3 FL (ref 79–97)
METHADONE UR QL SCN: NEGATIVE
MONOCYTES # BLD AUTO: 0.44 10*3/MM3 (ref 0.1–0.9)
MONOCYTES NFR BLD AUTO: 8.9 % (ref 5–12)
NEUTROPHILS NFR BLD AUTO: 3.13 10*3/MM3 (ref 1.7–7)
NEUTROPHILS NFR BLD AUTO: 63.3 % (ref 42.7–76)
NITRITE UR QL STRIP: NEGATIVE
NRBC BLD AUTO-RTO: 0 /100 WBC (ref 0–0.2)
OPIATES UR QL: POSITIVE
OXYCODONE UR QL SCN: NEGATIVE
PCP UR QL SCN: NEGATIVE
PH UR STRIP.AUTO: 5.5 [PH] (ref 5–8)
PLATELET # BLD AUTO: 211 10*3/MM3 (ref 140–450)
PMV BLD AUTO: 10.8 FL (ref 6–12)
POTASSIUM SERPL-SCNC: 4.2 MMOL/L (ref 3.5–5.2)
PROCALCITONIN SERPL-MCNC: 0.05 NG/ML (ref 0–0.25)
PROT SERPL-MCNC: 6.5 G/DL (ref 6–8.5)
PROT UR QL STRIP: NEGATIVE
QT INTERVAL: 410 MS
QTC INTERVAL: 455 MS
RBC # BLD AUTO: 3.91 10*6/MM3 (ref 4.14–5.8)
RBC # UR STRIP: ABNORMAL /HPF
REF LAB TEST METHOD: ABNORMAL
RHINOVIRUS RNA SPEC NAA+PROBE: NOT DETECTED
RSV RNA NPH QL NAA+NON-PROBE: NOT DETECTED
SALICYLATES SERPL-MCNC: <0.5 MG/DL
SARS-COV-2 RNA RESP QL NAA+PROBE: NOT DETECTED
SODIUM SERPL-SCNC: 142 MMOL/L (ref 136–145)
SP GR UR STRIP: 1.03 (ref 1–1.03)
SQUAMOUS #/AREA URNS HPF: ABNORMAL /HPF
TRICYCLICS UR QL SCN: NEGATIVE
UROBILINOGEN UR QL STRIP: ABNORMAL
WBC # UR STRIP: ABNORMAL /HPF
WBC NRBC COR # BLD AUTO: 4.95 10*3/MM3 (ref 3.4–10.8)
WHOLE BLOOD HOLD COAG: NORMAL
WHOLE BLOOD HOLD SPECIMEN: NORMAL

## 2025-08-10 PROCEDURE — 74176 CT ABD & PELVIS W/O CONTRAST: CPT

## 2025-08-10 PROCEDURE — 70450 CT HEAD/BRAIN W/O DYE: CPT

## 2025-08-10 PROCEDURE — 81001 URINALYSIS AUTO W/SCOPE: CPT

## 2025-08-10 PROCEDURE — 25810000003 SODIUM CHLORIDE 0.9 % SOLUTION

## 2025-08-10 PROCEDURE — 93005 ELECTROCARDIOGRAM TRACING: CPT

## 2025-08-10 PROCEDURE — 71250 CT THORAX DX C-: CPT | Performed by: RADIOLOGY

## 2025-08-10 PROCEDURE — 83605 ASSAY OF LACTIC ACID: CPT

## 2025-08-10 PROCEDURE — 80307 DRUG TEST PRSMV CHEM ANLYZR: CPT

## 2025-08-10 PROCEDURE — 36415 COLL VENOUS BLD VENIPUNCTURE: CPT

## 2025-08-10 PROCEDURE — 74176 CT ABD & PELVIS W/O CONTRAST: CPT | Performed by: RADIOLOGY

## 2025-08-10 PROCEDURE — 99284 EMERGENCY DEPT VISIT MOD MDM: CPT

## 2025-08-10 PROCEDURE — 80179 DRUG ASSAY SALICYLATE: CPT

## 2025-08-10 PROCEDURE — 86140 C-REACTIVE PROTEIN: CPT

## 2025-08-10 PROCEDURE — 87040 BLOOD CULTURE FOR BACTERIA: CPT

## 2025-08-10 PROCEDURE — 0202U NFCT DS 22 TRGT SARS-COV-2: CPT

## 2025-08-10 PROCEDURE — 84145 PROCALCITONIN (PCT): CPT

## 2025-08-10 PROCEDURE — 82077 ASSAY SPEC XCP UR&BREATH IA: CPT

## 2025-08-10 PROCEDURE — 80053 COMPREHEN METABOLIC PANEL: CPT

## 2025-08-10 PROCEDURE — 83735 ASSAY OF MAGNESIUM: CPT

## 2025-08-10 PROCEDURE — 85025 COMPLETE CBC W/AUTO DIFF WBC: CPT

## 2025-08-10 PROCEDURE — 70450 CT HEAD/BRAIN W/O DYE: CPT | Performed by: RADIOLOGY

## 2025-08-10 PROCEDURE — 71250 CT THORAX DX C-: CPT

## 2025-08-10 PROCEDURE — 80143 DRUG ASSAY ACETAMINOPHEN: CPT

## 2025-08-10 RX ORDER — SODIUM CHLORIDE 0.9 % (FLUSH) 0.9 %
10 SYRINGE (ML) INJECTION AS NEEDED
Status: DISCONTINUED | OUTPATIENT
Start: 2025-08-10 | End: 2025-08-10 | Stop reason: HOSPADM

## 2025-08-10 RX ADMIN — SODIUM CHLORIDE 1000 ML: 9 INJECTION, SOLUTION INTRAVENOUS at 12:32

## 2025-08-12 ENCOUNTER — HOSPITAL ENCOUNTER (EMERGENCY)
Facility: HOSPITAL | Age: 50
Discharge: HOME | End: 2025-08-12
Attending: STUDENT IN AN ORGANIZED HEALTH CARE EDUCATION/TRAINING PROGRAM | Admitting: STUDENT IN AN ORGANIZED HEALTH CARE EDUCATION/TRAINING PROGRAM
Payer: COMMERCIAL

## 2025-08-12 ENCOUNTER — APPOINTMENT (OUTPATIENT)
Dept: GENERAL RADIOLOGY | Facility: HOSPITAL | Age: 50
End: 2025-08-12
Payer: COMMERCIAL

## 2025-08-12 VITALS
OXYGEN SATURATION: 98 % | DIASTOLIC BLOOD PRESSURE: 74 MMHG | TEMPERATURE: 98.6 F | HEART RATE: 84 BPM | WEIGHT: 185.19 LBS | SYSTOLIC BLOOD PRESSURE: 122 MMHG | RESPIRATION RATE: 15 BRPM | HEIGHT: 72 IN | BODY MASS INDEX: 25.08 KG/M2

## 2025-08-12 DIAGNOSIS — F91.9 BEHAVIOR DISTURBANCE: ICD-10-CM

## 2025-08-12 DIAGNOSIS — S06.9X0D TRAUMATIC BRAIN INJURY, WITHOUT LOSS OF CONSCIOUSNESS, SUBSEQUENT ENCOUNTER: Primary | ICD-10-CM

## 2025-08-12 LAB
A-A DO2: 26.9 MMHG (ref 0–300)
ALBUMIN SERPL-MCNC: 3.7 G/DL (ref 3.5–5.2)
ALBUMIN/GLOB SERPL: 1.1 G/DL
ALP SERPL-CCNC: 176 U/L (ref 39–117)
ALT SERPL W P-5'-P-CCNC: 6 U/L (ref 1–41)
AMMONIA BLD-SCNC: 18 UMOL/L (ref 16–60)
ANION GAP SERPL CALCULATED.3IONS-SCNC: 13.8 MMOL/L (ref 5–15)
ARTERIAL PATENCY WRIST A: ABNORMAL
AST SERPL-CCNC: 20 U/L (ref 1–40)
ATMOSPHERIC PRESS: 728 MMHG
BACTERIA UR QL AUTO: ABNORMAL /HPF
BASE EXCESS BLDA CALC-SCNC: 0.7 MMOL/L (ref 0–2)
BASOPHILS # BLD AUTO: 0.04 10*3/MM3 (ref 0–0.2)
BASOPHILS NFR BLD AUTO: 0.8 % (ref 0–1.5)
BDY SITE: ABNORMAL
BILIRUB SERPL-MCNC: 0.4 MG/DL (ref 0–1.2)
BILIRUB UR QL STRIP: NEGATIVE
BUN SERPL-MCNC: 7.6 MG/DL (ref 6–20)
BUN/CREAT SERPL: 12.9 (ref 7–25)
CALCIUM SPEC-SCNC: 9 MG/DL (ref 8.6–10.5)
CHLORIDE SERPL-SCNC: 108 MMOL/L (ref 98–107)
CLARITY UR: CLEAR
CO2 BLDA-SCNC: 25 MMOL/L (ref 22–33)
CO2 SERPL-SCNC: 22.2 MMOL/L (ref 22–29)
COHGB MFR BLD: 0.8 % (ref 0–5)
COLOR UR: YELLOW
CREAT SERPL-MCNC: 0.59 MG/DL (ref 0.76–1.27)
D-LACTATE SERPL-SCNC: 1.2 MMOL/L (ref 0.5–2)
DEPRECATED RDW RBC AUTO: 42.6 FL (ref 37–54)
EGFRCR SERPLBLD CKD-EPI 2021: 117.5 ML/MIN/1.73
EOSINOPHIL # BLD AUTO: 0.04 10*3/MM3 (ref 0–0.4)
EOSINOPHIL NFR BLD AUTO: 0.8 % (ref 0.3–6.2)
ERYTHROCYTE [DISTWIDTH] IN BLOOD BY AUTOMATED COUNT: 12.9 % (ref 12.3–15.4)
GLOBULIN UR ELPH-MCNC: 3.3 GM/DL
GLUCOSE BLDC GLUCOMTR-MCNC: 97 MG/DL (ref 70–130)
GLUCOSE SERPL-MCNC: 110 MG/DL (ref 65–99)
GLUCOSE UR STRIP-MCNC: NEGATIVE MG/DL
HCO3 BLDA-SCNC: 23.9 MMOL/L (ref 20–26)
HCT VFR BLD AUTO: 35.3 % (ref 37.5–51)
HCT VFR BLD CALC: 36.9 % (ref 38–51)
HGB BLD-MCNC: 11.8 G/DL (ref 13–17.7)
HGB BLDA-MCNC: 12 G/DL (ref 14–18)
HGB UR QL STRIP.AUTO: NEGATIVE
HOLD SPECIMEN: NORMAL
HOLD SPECIMEN: NORMAL
HYALINE CASTS UR QL AUTO: ABNORMAL /LPF
IMM GRANULOCYTES # BLD AUTO: 0.01 10*3/MM3 (ref 0–0.05)
IMM GRANULOCYTES NFR BLD AUTO: 0.2 % (ref 0–0.5)
INHALED O2 CONCENTRATION: 21 %
INR PPP: 1.03 (ref 0.9–1.1)
KETONES UR QL STRIP: ABNORMAL
LEUKOCYTE ESTERASE UR QL STRIP.AUTO: ABNORMAL
LYMPHOCYTES # BLD AUTO: 1.17 10*3/MM3 (ref 0.7–3.1)
LYMPHOCYTES NFR BLD AUTO: 23.4 % (ref 19.6–45.3)
Lab: ABNORMAL
MCH RBC QN AUTO: 30.6 PG (ref 26.6–33)
MCHC RBC AUTO-ENTMCNC: 33.4 G/DL (ref 31.5–35.7)
MCV RBC AUTO: 91.5 FL (ref 79–97)
METHGB BLD QL: 0.4 % (ref 0–3)
MODALITY: ABNORMAL
MONOCYTES # BLD AUTO: 0.31 10*3/MM3 (ref 0.1–0.9)
MONOCYTES NFR BLD AUTO: 6.2 % (ref 5–12)
NEUTROPHILS NFR BLD AUTO: 3.42 10*3/MM3 (ref 1.7–7)
NEUTROPHILS NFR BLD AUTO: 68.6 % (ref 42.7–76)
NITRITE UR QL STRIP: NEGATIVE
NRBC BLD AUTO-RTO: 0 /100 WBC (ref 0–0.2)
OXYHGB MFR BLDV: 95.1 % (ref 94–99)
PCO2 BLDA: 33 MM HG (ref 35–45)
PCO2 TEMP ADJ BLD: ABNORMAL MM[HG]
PH BLDA: 7.47 PH UNITS (ref 7.35–7.45)
PH UR STRIP.AUTO: 7 [PH] (ref 5–8)
PH, TEMP CORRECTED: ABNORMAL
PLATELET # BLD AUTO: 223 10*3/MM3 (ref 140–450)
PMV BLD AUTO: 10.8 FL (ref 6–12)
PO2 BLDA: 78.8 MM HG (ref 83–108)
PO2 TEMP ADJ BLD: ABNORMAL MM[HG]
POTASSIUM SERPL-SCNC: 3.5 MMOL/L (ref 3.5–5.2)
PROT SERPL-MCNC: 7 G/DL (ref 6–8.5)
PROT UR QL STRIP: ABNORMAL
PROTHROMBIN TIME: 14.1 SECONDS (ref 12.5–15.2)
RBC # BLD AUTO: 3.86 10*6/MM3 (ref 4.14–5.8)
RBC # UR STRIP: ABNORMAL /HPF
REF LAB TEST METHOD: ABNORMAL
SAO2 % BLDCOA: 96.3 % (ref 94–99)
SODIUM SERPL-SCNC: 144 MMOL/L (ref 136–145)
SP GR UR STRIP: 1.02 (ref 1–1.03)
SQUAMOUS #/AREA URNS HPF: ABNORMAL /HPF
UROBILINOGEN UR QL STRIP: ABNORMAL
VENTILATOR MODE: ABNORMAL
WBC # UR STRIP: ABNORMAL /HPF
WBC NRBC COR # BLD AUTO: 4.99 10*3/MM3 (ref 3.4–10.8)
WHOLE BLOOD HOLD COAG: NORMAL
WHOLE BLOOD HOLD SPECIMEN: NORMAL

## 2025-08-12 PROCEDURE — 36415 COLL VENOUS BLD VENIPUNCTURE: CPT

## 2025-08-12 PROCEDURE — 82948 REAGENT STRIP/BLOOD GLUCOSE: CPT

## 2025-08-12 PROCEDURE — 83605 ASSAY OF LACTIC ACID: CPT | Performed by: STUDENT IN AN ORGANIZED HEALTH CARE EDUCATION/TRAINING PROGRAM

## 2025-08-12 PROCEDURE — 36600 WITHDRAWAL OF ARTERIAL BLOOD: CPT

## 2025-08-12 PROCEDURE — 71045 X-RAY EXAM CHEST 1 VIEW: CPT

## 2025-08-12 PROCEDURE — 82805 BLOOD GASES W/O2 SATURATION: CPT

## 2025-08-12 PROCEDURE — 82140 ASSAY OF AMMONIA: CPT | Performed by: STUDENT IN AN ORGANIZED HEALTH CARE EDUCATION/TRAINING PROGRAM

## 2025-08-12 PROCEDURE — 81001 URINALYSIS AUTO W/SCOPE: CPT | Performed by: STUDENT IN AN ORGANIZED HEALTH CARE EDUCATION/TRAINING PROGRAM

## 2025-08-12 PROCEDURE — 82375 ASSAY CARBOXYHB QUANT: CPT

## 2025-08-12 PROCEDURE — P9612 CATHETERIZE FOR URINE SPEC: HCPCS

## 2025-08-12 PROCEDURE — 87040 BLOOD CULTURE FOR BACTERIA: CPT | Performed by: STUDENT IN AN ORGANIZED HEALTH CARE EDUCATION/TRAINING PROGRAM

## 2025-08-12 PROCEDURE — 85610 PROTHROMBIN TIME: CPT | Performed by: STUDENT IN AN ORGANIZED HEALTH CARE EDUCATION/TRAINING PROGRAM

## 2025-08-12 PROCEDURE — 83050 HGB METHEMOGLOBIN QUAN: CPT

## 2025-08-12 PROCEDURE — 99283 EMERGENCY DEPT VISIT LOW MDM: CPT

## 2025-08-12 PROCEDURE — 85025 COMPLETE CBC W/AUTO DIFF WBC: CPT | Performed by: STUDENT IN AN ORGANIZED HEALTH CARE EDUCATION/TRAINING PROGRAM

## 2025-08-12 PROCEDURE — 80053 COMPREHEN METABOLIC PANEL: CPT | Performed by: STUDENT IN AN ORGANIZED HEALTH CARE EDUCATION/TRAINING PROGRAM

## 2025-08-12 PROCEDURE — 71045 X-RAY EXAM CHEST 1 VIEW: CPT | Performed by: RADIOLOGY

## 2025-08-12 RX ORDER — SODIUM CHLORIDE 0.9 % (FLUSH) 0.9 %
10 SYRINGE (ML) INJECTION AS NEEDED
Status: DISCONTINUED | OUTPATIENT
Start: 2025-08-12 | End: 2025-08-12 | Stop reason: HOSPADM

## 2025-08-15 LAB
BACTERIA SPEC AEROBE CULT: NORMAL
BACTERIA SPEC AEROBE CULT: NORMAL

## 2025-08-17 LAB
BACTERIA SPEC AEROBE CULT: NORMAL
BACTERIA SPEC AEROBE CULT: NORMAL

## 2025-08-26 ENCOUNTER — OFFICE VISIT (OUTPATIENT)
Dept: PSYCHIATRY | Facility: CLINIC | Age: 50
End: 2025-08-26
Payer: COMMERCIAL

## 2025-08-26 VITALS
HEIGHT: 72 IN | SYSTOLIC BLOOD PRESSURE: 128 MMHG | WEIGHT: 181 LBS | BODY MASS INDEX: 24.52 KG/M2 | DIASTOLIC BLOOD PRESSURE: 74 MMHG | OXYGEN SATURATION: 97 % | HEART RATE: 97 BPM

## 2025-08-26 DIAGNOSIS — R46.89 BEHAVIORAL PROBLEMS: ICD-10-CM

## 2025-08-26 DIAGNOSIS — G47.9 SLEEP DIFFICULTIES: ICD-10-CM

## 2025-08-26 DIAGNOSIS — Z79.899 MEDICATION MANAGEMENT: ICD-10-CM

## 2025-08-26 DIAGNOSIS — F43.25 ADJUSTMENT DISORDER WITH MIXED DISTURBANCE OF EMOTIONS AND CONDUCT: Primary | ICD-10-CM

## 2025-08-26 RX ORDER — SERTRALINE HYDROCHLORIDE 25 MG/1
25 TABLET, FILM COATED ORAL DAILY
Qty: 30 TABLET | Refills: 0 | Status: SHIPPED | OUTPATIENT
Start: 2025-08-26 | End: 2025-09-25

## 2025-08-26 RX ORDER — OLANZAPINE 10 MG/1
5 TABLET, FILM COATED ORAL 2 TIMES DAILY
COMMUNITY

## 2025-08-26 RX ORDER — MIRTAZAPINE 15 MG/1
15 TABLET, FILM COATED ORAL
COMMUNITY

## (undated) DEVICE — Device

## (undated) DEVICE — FRCP BX RADJAW4 NDL 2.8 240CM LG OG BX40

## (undated) DEVICE — THE BITE BLOCK MAXI, LATEX FREE STRAP IS USED TO PROTECT THE ENDOSCOPE INSERTION TUBE FROM BEING BITTEN BY THE PATIENT.